# Patient Record
Sex: FEMALE | Race: WHITE | NOT HISPANIC OR LATINO | Employment: OTHER | URBAN - METROPOLITAN AREA
[De-identification: names, ages, dates, MRNs, and addresses within clinical notes are randomized per-mention and may not be internally consistent; named-entity substitution may affect disease eponyms.]

---

## 2017-04-26 NOTE — PROGRESS NOTES
"   SUBJECTIVE:     CC: Deanna Leahy is an 22 year old woman who presents for preventive health visit.     Healthy Habits:    Do you get at least three servings of calcium containing foods daily (dairy, green leafy vegetables, etc.)? {YES/NO, DAIRY INTAKE:222134::\"yes\"}    Amount of exercise or daily activities, outside of work: {AMOUNT EXERCISE:374548}    Problems taking medications regularly {Yes /No default:205484::\"No\"}    Medication side effects: {Yes /No default.:002076::\"No\"}    Have you had an eye exam in the past two years? {YESNOBLANK:984739}    Do you see a dentist twice per year? {YESNOBLANK:259942}    Do you have sleep apnea, excessive snoring or daytime drowsiness?{YESNOBLANK:603461}    {Outside tests to abstract? :651176}    {additional problems to add:143458}    Today's PHQ-2 Score:   PHQ-2 ( 1999 Pfizer) 11/16/2016 4/22/2016   Q1: Little interest or pleasure in doing things 0 0   Q2: Feeling down, depressed or hopeless 0 0   PHQ-2 Score 0 0   Little interest or pleasure in doing things - -   Feeling down, depressed or hopeless - -   PHQ-2 Score - -     {PHQ-2 LOOK IN ASSESSMENTS :874166}  Abuse: Current or Past(Physical, Sexual or Emotional)- {YES/NO/NA:927473}  Do you feel safe in your environment - {YES/NO/NA:837213}    Social History   Substance Use Topics     Smoking status: Never Smoker     Smokeless tobacco: Never Used     Alcohol use No     {ETOH AUDIT:660531}    No results for input(s): CHOL, HDL, LDL, TRIG, CHOLHDLRATIO, NHDL in the last 77868 hours.    Reviewed orders with patient.  Reviewed health maintenance and updated orders accordingly - {Yes/No:536752::\"Yes\"}    Mammo Decision Support:  {Mammo:520634}    Pertinent mammograms are reviewed under the imaging tab.  History of abnormal Pap smear: {PAP HX:361179}    Reviewed and updated as needed this visit by clinical staff         Reviewed and updated as needed this visit by Provider        {HISTORY OPTIONS:348462}    ROS:  {FEMALE " "PREVENTATIVE ROS:735055}    {CHRONICPROBDATA:954276}  OBJECTIVE:     There were no vitals taken for this visit.  EXAM:  {Exam Choices:315272}    ASSESSMENT/PLAN:     {Diag Picklist:570646}    COUNSELING:   {FEMALE COUNSELING MESSAGES:801058::\"Reviewed preventive health counseling, as reflected in patient instructions\"}    {Blood Pressure Screenin}     reports that she has never smoked. She has never used smokeless tobacco.  {Tobacco Cessation needed for ACO -- Delete if patient is a non-smoker:915832}  Estimated body mass index is 23.92 kg/(m^2) as calculated from the following:    Height as of 16: 5' 2.4\" (1.585 m).    Weight as of 16: 132 lb 8 oz (60.1 kg).   {Weight Management Plan needed for ACO:202273}    Counseling Resources:  ATP IV Guidelines  Pooled Cohorts Equation Calculator  Breast Cancer Risk Calculator  FRAX Risk Assessment  ICSI Preventive Guidelines  Dietary Guidelines for Americans,   USDA's MyPlate  ASA Prophylaxis  Lung CA Screening    Sho De La Torre PA-C  [facility]  "

## 2017-05-03 ENCOUNTER — OFFICE VISIT (OUTPATIENT)
Dept: FAMILY MEDICINE | Facility: OTHER | Age: 22
End: 2017-05-03
Payer: COMMERCIAL

## 2017-05-03 VITALS
TEMPERATURE: 98.7 F | RESPIRATION RATE: 12 BRPM | SYSTOLIC BLOOD PRESSURE: 110 MMHG | BODY MASS INDEX: 24.84 KG/M2 | DIASTOLIC BLOOD PRESSURE: 64 MMHG | HEART RATE: 72 BPM | WEIGHT: 135 LBS | HEIGHT: 62 IN

## 2017-05-03 DIAGNOSIS — N92.6 MENSTRUAL PERIOD LATE: ICD-10-CM

## 2017-05-03 DIAGNOSIS — D50.9 IRON DEFICIENCY ANEMIA, UNSPECIFIED IRON DEFICIENCY ANEMIA TYPE: ICD-10-CM

## 2017-05-03 DIAGNOSIS — Z23 NEED FOR VACCINATION: ICD-10-CM

## 2017-05-03 DIAGNOSIS — Z00.00 ENCOUNTER FOR ROUTINE ADULT HEALTH EXAMINATION WITHOUT ABNORMAL FINDINGS: Primary | ICD-10-CM

## 2017-05-03 LAB
B-HCG SERPL-ACNC: <1 IU/L (ref 0–5)
BETA HCG QUAL IFA URINE: NEGATIVE
FERRITIN SERPL-MCNC: 18 NG/ML (ref 12–150)
HGB BLD-MCNC: 13.7 G/DL (ref 11.7–15.7)
IRON SATN MFR SERPL: 12 % (ref 15–46)
IRON SERPL-MCNC: 45 UG/DL (ref 35–180)
TIBC SERPL-MCNC: 361 UG/DL (ref 240–430)
TSH SERPL DL<=0.005 MIU/L-ACNC: 1.76 MU/L (ref 0.4–4)

## 2017-05-03 PROCEDURE — 90715 TDAP VACCINE 7 YRS/> IM: CPT | Performed by: PHYSICIAN ASSISTANT

## 2017-05-03 PROCEDURE — 99395 PREV VISIT EST AGE 18-39: CPT | Mod: 25 | Performed by: PHYSICIAN ASSISTANT

## 2017-05-03 PROCEDURE — 84702 CHORIONIC GONADOTROPIN TEST: CPT | Performed by: PHYSICIAN ASSISTANT

## 2017-05-03 PROCEDURE — 85018 HEMOGLOBIN: CPT | Performed by: PHYSICIAN ASSISTANT

## 2017-05-03 PROCEDURE — 82728 ASSAY OF FERRITIN: CPT | Performed by: PHYSICIAN ASSISTANT

## 2017-05-03 PROCEDURE — 84703 CHORIONIC GONADOTROPIN ASSAY: CPT | Performed by: PHYSICIAN ASSISTANT

## 2017-05-03 PROCEDURE — 36415 COLL VENOUS BLD VENIPUNCTURE: CPT | Performed by: PHYSICIAN ASSISTANT

## 2017-05-03 PROCEDURE — 83540 ASSAY OF IRON: CPT | Performed by: PHYSICIAN ASSISTANT

## 2017-05-03 PROCEDURE — 83550 IRON BINDING TEST: CPT | Performed by: PHYSICIAN ASSISTANT

## 2017-05-03 PROCEDURE — 84443 ASSAY THYROID STIM HORMONE: CPT | Performed by: PHYSICIAN ASSISTANT

## 2017-05-03 PROCEDURE — 90471 IMMUNIZATION ADMIN: CPT | Performed by: PHYSICIAN ASSISTANT

## 2017-05-03 ASSESSMENT — PAIN SCALES - GENERAL: PAINLEVEL: NO PAIN (0)

## 2017-05-03 NOTE — NURSING NOTE
Prior to injection verified patient identity using patient's name and date of birth.  Screening Questionnaire for Adult Immunization    Are you sick today?   No   Do you have allergies to medications, food, a vaccine component or latex?   No   Have you ever had a serious reaction after receiving a vaccination?   No   Do you have a long-term health problem with heart disease, lung disease, asthma, kidney disease, metabolic disease (e.g. diabetes), anemia, or other blood disorder?   No   Do you have cancer, leukemia, HIV/AIDS, or any other immune system problem?   No   In the past 3 months, have you taken medications that affect  your immune system, such as prednisone, other steroids, or anticancer drugs; drugs for the treatment of rheumatoid arthritis, Crohn s disease, or psoriasis; or have you had radiation treatments?   No   Have you had a seizure, or a brain or other nervous system problem?   No   During the past year, have you received a transfusion of blood or blood     products, or been given immune (gamma) globulin or antiviral drug?   No   For women: Are you pregnant or is there a chance you could become        pregnant during the next month?   No   Have you received any vaccinations in the past 4 weeks?   No     Immunization questionnaire answers were all negative.      MNVFC doesn't apply on this patient    Per orders of Sho De La Torre, injection of tdap given by Sakina Flower. Patient instructed to remain in clinic for 20 minutes afterwards, and to report any adverse reaction to me immediately.       Screening performed by Sakina Flower on 5/3/2017 at 2:27 PM.

## 2017-05-03 NOTE — MR AVS SNAPSHOT
After Visit Summary   5/3/2017    Deanna Leahy    MRN: [id number]           Patient Information     Date Of Birth          1995        Visit Information        Provider Department      5/3/2017 9:30 AM Sho De La Torre PA-C [facility]        Today's Diagnoses     Encounter for routine adult health examination without abnormal findings    -  1    Menstrual period late        Iron deficiency anemia, unspecified iron deficiency anemia type        Need for vaccination          Care Instructions      Preventive Health Recommendations  Female Ages 18 to 25     Yearly exam:     See your health care provider every year in order to  o Review health changes.   o Discuss preventive care.    o Review your medicines if your doctor has prescribed any.      You should be tested each year for STDs (sexually transmitted diseases).       After age 20, talk to your provider about how often you should have cholesterol testing.      Starting at age 21, get a Pap test every three years. If you have an abnormal result, your doctor may have you test more often.      If you are at risk for diabetes, you should have a diabetes test (fasting glucose).     Shots:     Get a flu shot each year.     Get a tetanus shot every 10 years.     Consider getting the shot (vaccine) that prevents cervical cancer (Gardasil).    Nutrition:     Eat at least 5 servings of fruits and vegetables each day.    Eat whole-grain bread, whole-wheat pasta and brown rice instead of white grains and rice.    Talk to your provider about Calcium and Vitamin D.     Lifestyle    Exercise at least 150 minutes a week each week (30 minutes a day, 5 days a week). This will help you control your weight and prevent disease.    Limit alcohol to one drink per day.    No smoking.     Wear sunscreen to prevent skin cancer.    See your dentist every six months for an exam and cleaning.        Follow-ups after your visit        Who to contact   "   If you have questions or need follow up information about today's clinic visit or your schedule please contact [facility] directly at [phone].  Normal or non-critical lab and imaging results will be communicated to you by MyChart, letter or phone within 4 business days after the clinic has received the results. If you do not hear from us within 7 days, please contact the clinic through ditlohart or phone. If you have a critical or abnormal lab result, we will notify you by phone as soon as possible.  Submit refill requests through [email or web address] or call your pharmacy and they will forward the refill request to us. Please allow 3 business days for your refill to be completed.          Additional Information About Your Visit        [email or web address] Information     [email or web address] gives you secure access to your electronic health record. If you see a primary care provider, you can also send messages to your care team and make appointments. If you have questions, please call your primary care clinic.  If you do not have a primary care provider, please call [phone] and they will assist you.        Care EveryWhere ID     This is your Care EveryWhere ID. This could be used by other organizations to access your [place] medical records  [id number]        Your Vitals Were     Pulse Temperature Respirations Height Last Period BMI (Body Mass Index)    72 98.7  F (37.1  C) (Oral) 12 5' 2.25\" (1.581 m) 03/28/2017 24.49 kg/m2       Blood Pressure from Last 3 Encounters:   05/03/17 110/64   11/16/16 120/68   04/22/16 108/60    Weight from Last 3 Encounters:   05/03/17 135 lb (61.2 kg)   11/16/16 132 lb 8 oz (60.1 kg)   04/22/16 126 lb 14.4 oz (57.6 kg)              We Performed the Following     Beta HCG qual IFA urine     Ferritin     HCG, quantitative, pregnancy     Hemoglobin     Iron and iron binding capacity     TDAP VACCINE (ADACEL) [77581.002]     TSH with free T4 reflex        Primary Care Provider Office Phone # Fax " #    Sho De La Torre PA-C [phone]       [facility] [address] [place] [address]        Thank you!     Thank you for choosing [facility]  for your care. Our goal is always to provide you with excellent care. Hearing back from our patients is one way we can continue to improve our services. Please take a few minutes to complete the written survey that you may receive in the mail after your visit with us. Thank you!             Your Updated Medication List - Protect others around you: Learn how to safely use, store and throw away your medicines at www.disposemymeds.org.          This list is accurate as of: 5/3/17  1:13 PM.  Always use your most recent med list.                   Brand Name Dispense Instructions for use    ibuprofen 200 MG tablet    ADVIL/MOTRIN    120 tablet    Take 3 tablets (600 mg) by mouth every 6 hours as needed for pain or fever

## 2017-05-03 NOTE — NURSING NOTE
"Chief Complaint   Patient presents with     Physical     Panel Management     Tdap, HPV, Chlamydia       Initial /64  Pulse 72  Temp 98.7  F (37.1  C) (Oral)  Resp 12  Ht 5' 2.25\" (1.581 m)  Wt 135 lb (61.2 kg)  LMP 03/28/2017  BMI 24.49 kg/m2 Estimated body mass index is 24.49 kg/(m^2) as calculated from the following:    Height as of this encounter: 5' 2.25\" (1.581 m).    Weight as of this encounter: 135 lb (61.2 kg).  Medication Reconciliation: complete       Sakina Flower CMA (AAMA)      "

## 2017-05-03 NOTE — PROGRESS NOTES
SUBJECTIVE:     CC: Deanna Leahy is an 22 year old woman who presents for preventive health visit.     Physical   Annual:     Getting at least 3 servings of Calcium per day::  Yes    Bi-annual eye exam::  NO    Dental care twice a year::  Yes    Sleep apnea or symptoms of sleep apnea::  Daytime drowsiness    Diet::  Regular (no restrictions)    Frequency of exercise::  4-5 days/week    Duration of exercise::  30-45 minutes    Taking medications regularly::  Yes    Medication side effects::  None    Additional concerns today::  YES (discuss late menstrual cycle and iron/iron levels)      Period is 2 weeks late   Her and her boyfriend have been trying to get pregnant. They have been together for 5 years  Has taken 2 neg home pregnancy tests 1 week ago  33-35 day cycle is normal for her      Feels like daytime drowsiness related to low iron  Glasgow light 21mg iron  5 days per week\      Today's PHQ-2 Score:   PHQ-2 ( 1999 Pfizer) 4/30/2017   Little interest or pleasure in doing things Not at all   Feeling down, depressed or hopeless Not at all   PHQ-2 Score 0       Abuse: Current or Past(Physical, Sexual or Emotional)- No  Do you feel safe in your environment - Yes    Social History   Substance Use Topics     Smoking status: Never Smoker     Smokeless tobacco: Never Used     Alcohol use No     The patient does not drink >3 drinks per day nor >7 drinks per week.    No results for input(s): CHOL, HDL, LDL, TRIG, CHOLHDLRATIO, NHDL in the last 11817 hours.    Reviewed orders with patient.  Reviewed health maintenance and updated orders accordingly - Yes    Mammo Decision Support:  Mammogram not appropriate for this patient based on age.    Pertinent mammograms are reviewed under the imaging tab.  History of abnormal Pap smear: NO - age 21-29 PAP every 3 years recommended    Reviewed and updated as needed this visit by clinical staff  Tobacco  Allergies  Med Hx  Surg Hx  Fam Hx  Soc Hx        Reviewed and  "updated as needed this visit by Provider            ROS:  C: NEGATIVE for fever, chills, change in weight  I: NEGATIVE for worrisome rashes, moles or lesions  E: NEGATIVE for vision changes or irritation  ENT: NEGATIVE for ear, mouth and throat problems  R: NEGATIVE for significant cough or SOB  BREAST: POSITIVE for left breat tenderness  CV: NEGATIVE for chest pain, palpitations or peripheral edema  GI: POSITIVE for nausea  : NEGATIVE for unusual urinary or vaginal symptoms. Periods are regular.  M: NEGATIVE for significant arthralgias or myalgia  N: NEGATIVE for weakness, dizziness or paresthesias  P: NEGATIVE for changes in mood or affect    Labs reviewed in EPIC  BP Readings from Last 3 Encounters:   05/03/17 110/64   11/16/16 120/68   04/22/16 108/60    Wt Readings from Last 3 Encounters:   05/03/17 135 lb (61.2 kg)   11/16/16 132 lb 8 oz (60.1 kg)   04/22/16 126 lb 14.4 oz (57.6 kg)                  OBJECTIVE:     /64  Pulse 72  Temp 98.7  F (37.1  C) (Oral)  Resp 12  Ht 5' 2.25\" (1.581 m)  Wt 135 lb (61.2 kg)  LMP 03/28/2017  BMI 24.49 kg/m2  EXAM:  GENERAL: healthy, alert and no distress  EYES: Eyes grossly normal to inspection, PERRL and conjunctivae and sclerae normal  HENT: ear canals and TM's normal, nose and mouth without ulcers or lesions  NECK: no adenopathy, no asymmetry, masses, or scars and thyroid normal to palpation  RESP: lungs clear to auscultation - no rales, rhonchi or wheezes  BREAST: normal without masses, tenderness or nipple discharge and no palpable axillary masses or adenopathy  BREAST: tenderness left medial breast, no masses   CV: regular rate and rhythm, normal S1 S2, no S3 or S4, no murmur, click or rub, no peripheral edema and peripheral pulses strong  ABDOMEN: soft, nontender, no hepatosplenomegaly, no masses and bowel sounds normal  MS: no gross musculoskeletal defects noted, no edema  SKIN: no suspicious lesions or rashes  NEURO: Normal strength and tone, " "mentation intact and speech normal  PSYCH: mentation appears normal, affect normal/bright    ASSESSMENT/PLAN:     1. Encounter for routine adult health examination without abnormal findings  Breast tenderness- she will observe if it is not resolved with next menses will do ultrasound     2. Menstrual period late  Await results, if her periods remain irregular she will let me know, eat healthy , exercise and get enough sleep   - Beta HCG qual IFA urine  - TSH with free T4 reflex  - HCG, quantitative, pregnancy    3. Iron deficiency anemia, unspecified iron deficiency anemia type  Continue iron supplement   - Ferritin  - Hemoglobin  - Iron and iron binding capacity    4. Need for vaccination  Updated   - TDAP VACCINE (ADACEL) [28084.002]    COUNSELING:  Reviewed preventive health counseling, as reflected in patient instructions         reports that she has never smoked. She has never used smokeless tobacco.    Estimated body mass index is 24.49 kg/(m^2) as calculated from the following:    Height as of this encounter: 5' 2.25\" (1.581 m).    Weight as of this encounter: 135 lb (61.2 kg).       Counseling Resources:  ATP IV Guidelines  Pooled Cohorts Equation Calculator  Breast Cancer Risk Calculator  FRAX Risk Assessment  ICSI Preventive Guidelines  Dietary Guidelines for Americans, 2010  [employer]'s MyPlate  ASA Prophylaxis  Lung CA Screening    Sho De La Torre PA-C  [facility]  Answers for HPI/ROS submitted by the patient on 4/30/2017   Q1: Little interest or pleasure in doing things: 0=Not at all  Q2: Feeling down, depressed or hopeless: 0=Not at all  PHQ-2 Score: 0    "

## 2017-07-14 ENCOUNTER — TELEPHONE (OUTPATIENT)
Dept: FAMILY MEDICINE | Facility: OTHER | Age: 22
End: 2017-07-14

## 2017-07-14 ENCOUNTER — ALLIED HEALTH/NURSE VISIT (OUTPATIENT)
Dept: FAMILY MEDICINE | Facility: OTHER | Age: 22
End: 2017-07-14
Payer: COMMERCIAL

## 2017-07-14 DIAGNOSIS — N91.2 AMENORRHEA: Primary | ICD-10-CM

## 2017-07-14 LAB — B-HCG SERPL-ACNC: 339 IU/L (ref 0–5)

## 2017-07-14 PROCEDURE — 36415 COLL VENOUS BLD VENIPUNCTURE: CPT | Performed by: FAMILY MEDICINE

## 2017-07-14 PROCEDURE — 84702 CHORIONIC GONADOTROPIN TEST: CPT | Performed by: FAMILY MEDICINE

## 2017-07-14 PROCEDURE — 99207 ZZC NO CHARGE NURSE ONLY: CPT

## 2017-07-14 NOTE — MR AVS SNAPSHOT
"              After Visit Summary   7/14/2017    Deanna Leahy    MRN: [id number]           Patient Information     Date Of Birth          1995        Visit Information        Provider Department      7/14/2017 11:00 AM NL RN [facility]        Today's Diagnoses     Amenorrhea    -  1      Care Instructions    According to your last menstrual period, you are approximately 6 weeks and 0 days pregnant.  Your estimated due date is 03/09/2018.    To do list:  1. If you have not already started taking a prenatal vitamin, please start today.  2. Visit with our OB Intake nurse, Carlyn: this can be scheduled any time between today and the first visit with your provider. We do require that you see OB intake before your \"first OB\" visit if you choose to deliver at [facility].          Mondays- [place] (11am-6pm)       Tuesdays- [place] (9am-11am)/ [place] (1pm-3pm)       Wednesdays- [place] (9am-2pm)       Thursdays- [place] (8am-10am)    3. The first OB visit with provider of choice is to be scheduled between 10 and 12 weeks: .  To make these appointments, or if you have any questions and would like to speak to your care team, you can call the clinic's main phone number:       [facility]: [phone]       [facility]: [phone]       [facility]: [phone]       [facility]: [phone]       [facility]: [phone]    Please remember, we would like to hear from you if you have any vaginal bleeding or any moderate to severe abdominal pain/cramping. You can call any of the phone numbers above and speak with an RN promptly, even if it is after clinic hours, someone will answer your call. You can also call the numbers listed in your take home folder from today's visit.     Thank you for choosing [facility], and Congratulations!    Jasper Smith, RN, BSN                    Follow-ups after your visit        Who to contact     If you have " questions or need follow up information about today's clinic visit or your schedule please contact [facility] directly at [phone].  Normal or non-critical lab and imaging results will be communicated to you by MyChart, letter or phone within 4 business days after the clinic has received the results. If you do not hear from us within 7 days, please contact the clinic through [employer] or phone. If you have a critical or abnormal lab result, we will notify you by phone as soon as possible.  Submit refill requests through [employer] or call your pharmacy and they will forward the refill request to us. Please allow 3 business days for your refill to be completed.          Additional Information About Your Visit        [employer] Information     [employer] gives you secure access to your electronic health record. If you see a primary care provider, you can also send messages to your care team and make appointments. If you have questions, please call your primary care clinic.  If you do not have a primary care provider, please call [phone] and they will assist you.        Care EveryWhere ID     This is your Care EveryWhere ID. This could be used by other organizations to access your [place] medical records  [id number]        Your Vitals Were     Last Period Breastfeeding?                06/02/2017 (Exact Date) No           Blood Pressure from Last 3 Encounters:   05/03/17 110/64   11/16/16 120/68   04/22/16 108/60    Weight from Last 3 Encounters:   05/03/17 135 lb (61.2 kg)   11/16/16 132 lb 8 oz (60.1 kg)   04/22/16 126 lb 14.4 oz (57.6 kg)              We Performed the Following     Beta HCG qual IFA urine     HCG quantitative pregnancy        Primary Care Provider Office Phone # Fax #    Sho De La Torre PA-C [phone]       [facility] [address] [place] [address]        Equal Access to Services     CARLOS CONN: hollie Rios,  natalyachiquisgeorgina isacaseyjovi gasparjasmyn eng elliottin hayaan adeeg kharash la'aan ah. [facility] [phone].    ATENCIÓN: Si belkis thomas, tiene a simons disposición servicios gratuitos de asistencia lingüística. Antonio al [phone].    We comply with applicable federal civil rights laws and [place] laws. We do not discriminate on the basis of race, color, national origin, age, disability sex, sexual orientation or gender identity.            Thank you!     Thank you for choosing [facility]  for your care. Our goal is always to provide you with excellent care. Hearing back from our patients is one way we can continue to improve our services. Please take a few minutes to complete the written survey that you may receive in the mail after your visit with us. Thank you!             Your Updated Medication List - Protect others around you: Learn how to safely use, store and throw away your medicines at www.disposemymeds.org.          This list is accurate as of: 7/14/17 11:18 AM.  Always use your most recent med list.                   Brand Name Dispense Instructions for use Diagnosis    ibuprofen 200 MG tablet    ADVIL/MOTRIN    120 tablet    Take 3 tablets (600 mg) by mouth every 6 hours as needed for pain or fever

## 2017-07-14 NOTE — PROGRESS NOTES
Patient is here for pregnancy confirmation.  She was seen at [facility] yesterday for some bleeding and cramping.  They advised her to follow up in clinic for repeat HCG.  LMP 06/02/2017    Will follow up with OBGYN if number rising.  Has plan from [facility] if decreasing.    Jasper Smith, RN, BSN

## 2017-07-14 NOTE — PATIENT INSTRUCTIONS
"According to your last menstrual period, you are approximately 6 weeks and 0 days pregnant.  Your estimated due date is 03/09/2018.    To do list:  1. If you have not already started taking a prenatal vitamin, please start today.  2. Visit with our OB Intake nurse, Carlyn: this can be scheduled any time between today and the first visit with your provider. We do require that you see OB intake before your \"first OB\" visit if you choose to deliver at [facility].          Mondays- [place] (11am-6pm)       Tuesdays- [place] (9am-11am)/ [place] (1pm-3pm)       Wednesdays- [place] (9am-2pm)       Thursdays- [place] (8am-10am)    3. The first OB visit with provider of choice is to be scheduled between 10 and 12 weeks: .  To make these appointments, or if you have any questions and would like to speak to your care team, you can call the clinic's main phone number:       [facility]: [phone]       [facility]: [phone]       [facility]: [phone]       [facility]: [phone]       [facility]: [phone]    Please remember, we would like to hear from you if you have any vaginal bleeding or any moderate to severe abdominal pain/cramping. You can call any of the phone numbers above and speak with an RN promptly, even if it is after clinic hours, someone will answer your call. You can also call the numbers listed in your take home folder from today's visit.     Thank you for choosing [facility], and Congratulations!    Jasper Smith, RN, BSN            "

## 2017-07-17 ENCOUNTER — MYC MEDICAL ADVICE (OUTPATIENT)
Dept: FAMILY MEDICINE | Facility: OTHER | Age: 22
End: 2017-07-17

## 2017-07-17 ENCOUNTER — TELEPHONE (OUTPATIENT)
Dept: FAMILY MEDICINE | Facility: OTHER | Age: 22
End: 2017-07-17

## 2017-07-17 NOTE — TELEPHONE ENCOUNTER
Result read via [employer].  Left message to return call if she had any questions or concerns.    Jasper Smith RN, BSN      BETA HCG - QUANTITATIVE (07/13/2017 10:45 AM)  BETA HCG - QUANTITATIVE (07/13/2017 10:45 AM)   Component Value Ref Range   BETA-.4      Result from Friday is 339    Probable miscarriage,  Patient was given plan from [facility].    To follow up with primary care if needed

## 2017-08-29 ENCOUNTER — TELEPHONE (OUTPATIENT)
Dept: FAMILY MEDICINE | Facility: OTHER | Age: 22
End: 2017-08-29

## 2017-08-29 NOTE — PROGRESS NOTES
SUBJECTIVE:                                                    Deanna Leahy is a 22 year old female who presents to clinic today for the following health issues:      HPI    Dizziness--   Onset: Ongoing for the last month , she had a miscarriage the second week in July. Had heavy bleeding with this for 10 days.  The dizziness started a few weeks afterwards.  She has had 1 normal period since then.  She has heavy periods typically lasting 6-7 days with the first few days being heavy.  She has a history of iron deficiency anemia.  She has struggled to take her supplements lately due to it getting stuck in her throat.       Description:   Do you feel faint:  YES, on and off   Does it feel like the surroundings (bed, room) are moving: sometimes   Unsteady/off balance: sometimes   Have you passed out or fallen: YES, for a second yesterday - she was at work at [employer], did not feel well, got a ride home.  Once at home she was sitting down, and felt dizzy she fainted just for a second per pt. She has been drinking a lot of caffeine due to her fatigue.    She has been drinking protein shakes.  Trying to eat healthy foods.  She has been so tried lately.  Her eyelids have been twitching per pt.  She is taking prenatal gummy vit without iron and trying to take her iron supplement on the side.     Intensity: mild    Progression of Symptoms:  same    Accompanying Signs & Symptoms:  Heart palpitations: no   Nausea, vomiting: YES, in the morning feeling nauseous , gets heartburn on occasion as well   Weakness in arms or legs: YES, arms feeling very heavy   Fatigue: YES  Vision or speech changes: no   Ringing in ears (Tinnitus): no   Hearing Loss: no     History:   Head trauma/concussion hx: no   Previous similar symptoms: no   Recent bleeding history: no     Precipitating factors:   Worse with activity or head movement: YES, at work   Any new medications (BP?): no   Alcohol/drug abuse/withdrawal: no  "    Alleviating factors:   Does staying in a fixed position give relief:  YES    Therapies Tried and outcome:  Was told to take Iron supplement but did continue  because it kept getting stuck in the throat.       Problem list and histories reviewed & adjusted, as indicated.  Additional history: as documented        BP Readings from Last 3 Encounters:   08/30/17 102/80   05/03/17 110/64   11/16/16 120/68    Wt Readings from Last 3 Encounters:   08/30/17 131 lb 14.4 oz (59.8 kg)   05/03/17 135 lb (61.2 kg)   11/16/16 132 lb 8 oz (60.1 kg)              Labs reviewed in EPIC      ROS:  C: NEGATIVE for fever, chills, change in weight  E/M: NEGATIVE for ear, mouth and throat problems  R: NEGATIVE for significant cough or SOB  CV: NEGATIVE for chest pain, palpitations or peripheral edema  GI: NEGATIVE for nausea, abdominal pain, heartburn, or change in bowel habits  NEURO: positive for syncope yesterday   PSYCHIATRIC: NEGATIVE for changes in mood or affect, doing ok with miscarriage per pt     OBJECTIVE:     /80  Pulse 80  Temp 97.9  F (36.6  C) (Oral)  Resp 16  Ht 4' 11.96\" (1.523 m)  Wt 131 lb 14.4 oz (59.8 kg)  LMP 08/16/2017 (Approximate)  Breastfeeding? Unknown  BMI 25.79 kg/m2  Body mass index is 25.79 kg/(m^2).  GENERAL: healthy, alert and no distress  NECK: no adenopathy, no asymmetry, masses, or scars and thyroid normal to palpation  RESP: lungs clear to auscultation - no rales, rhonchi or wheezes  CV: regular rate and rhythm, normal S1 S2, no S3 or S4, no murmur, click or rub, no peripheral edema and peripheral pulses strong  ABDOMEN: soft, nontender, no hepatosplenomegaly, no masses and bowel sounds normal  MS: no gross musculoskeletal defects noted, no edema  SKIN: no suspicious lesions or rashes  PSYCH: mentation appears normal, affect normal/bright    Diagnostic Test Results:  Results for orders placed or performed in visit on 08/30/17 (from the past 24 hour(s))   CBC with platelets   Result " Value Ref Range    WBC 6.2 4.0 - 11.0 10e9/L    RBC Count 5.01 3.8 - 5.2 10e12/L    Hemoglobin 14.3 11.7 - 15.7 g/dL    Hematocrit 43.2 35.0 - 47.0 %    MCV 86 78 - 100 fl    MCH 28.5 26.5 - 33.0 pg    MCHC 33.1 31.5 - 36.5 g/dL    RDW 13.0 10.0 - 15.0 %    Platelet Count 304 150 - 450 10e9/L       ASSESSMENT/PLAN:       1. Fatigue, unspecified type  Will evaluate with labs, advised less energy drinks more iron containing foods and water   - HCG quantitative pregnancy  - CBC with platelets  - Ferritin  - Iron and iron binding capacity  - Glucose  - TSH with free T4 reflex    2. Iron deficiency anemia, unspecified iron deficiency anemia type  Try to find chewable prenatal with iron or add liquid iron to gummy prenatal  - CBC with platelets  - Ferritin  - Iron and iron binding capacity    3. Syncope, unspecified syncope type  Perhaps related to iron deficiency and blood loss with miscarriage, does not sound cardiac related   - HCG quantitative pregnancy  - CBC with platelets  - Ferritin  - Iron and iron binding capacity  - Glucose  - TSH with free T4 reflex    Recheck as needed     Sho De La Torre PA-C  [facility]  Electronically signed by Sho De La Torre PA-C

## 2017-08-29 NOTE — TELEPHONE ENCOUNTER
Deanna Leahy is a 22 year old female who calls with dizziness.    NURSING ASSESSMENT:  Description:  Patient describes a constant, yet intermittent mild dizziness. She also feels like her iron pills get stuck in her throat when she takes them.   Onset/duration: several days  Precip. factors:  History of low iron  Associated symptoms:  fatigue  Allergies:   Allergies   Allergen Reactions     No Known Drug Allergies        RECOMMENDED DISPOSITION:  Will keep appointment for tomorrow morning with NP.  Will comply with recommendation: YES   If further questions/concerns or if Sx do not improve, worsen or new Sx develop, call your PCP or [facility] as soon as possible.    NOTES:  Disposition was determined by the first positive assessment question, therefore all previous assessment questions were negative.     Guideline used:  Telephone Triage Protocols for Nurses, Fifth Edition, Giovana Dong, RN, BSN

## 2017-08-29 NOTE — TELEPHONE ENCOUNTER
Left message for patient to return call to RN.   Did hold time for tomorrow 9 am with NP to confirm.    Jasper Smith, RN, BSN

## 2017-08-29 NOTE — TELEPHONE ENCOUNTER
See below. Can you work her in tomorrow? Would you like her triaged. Please call.       Thank you,     Jud Lara- Pt Rep.           ----- Message -----        From: Deanna Leahy        Sent: 8/29/2017   1:53 PM          To: [facility] Schedulers     Subject: Appointment Request                                    Appointment Request From: Deanna Leahy          With Provider: Sho De La Torre PA-C [-Primary Care Physician-]          Preferred Date Range: From 8/30/2017 To 8/30/2017          Preferred Times: Any          Reason for visit: Request an Appointment          Comments:     I know this is short notice but I'm wondering if you have any appointments open for tomorrow? I have been light-headed and I take iron supplement and the pills keep getting stuck in my throat.

## 2017-08-30 ENCOUNTER — OFFICE VISIT (OUTPATIENT)
Dept: FAMILY MEDICINE | Facility: OTHER | Age: 22
End: 2017-08-30
Payer: COMMERCIAL

## 2017-08-30 VITALS
BODY MASS INDEX: 25.9 KG/M2 | TEMPERATURE: 97.9 F | WEIGHT: 131.9 LBS | RESPIRATION RATE: 16 BRPM | DIASTOLIC BLOOD PRESSURE: 80 MMHG | HEIGHT: 60 IN | SYSTOLIC BLOOD PRESSURE: 102 MMHG | HEART RATE: 80 BPM

## 2017-08-30 DIAGNOSIS — R55 SYNCOPE, UNSPECIFIED SYNCOPE TYPE: ICD-10-CM

## 2017-08-30 DIAGNOSIS — D50.9 IRON DEFICIENCY ANEMIA, UNSPECIFIED IRON DEFICIENCY ANEMIA TYPE: ICD-10-CM

## 2017-08-30 DIAGNOSIS — R53.83 FATIGUE, UNSPECIFIED TYPE: Primary | ICD-10-CM

## 2017-08-30 LAB
B-HCG SERPL-ACNC: <1 IU/L (ref 0–5)
ERYTHROCYTE [DISTWIDTH] IN BLOOD BY AUTOMATED COUNT: 13 % (ref 10–15)
FERRITIN SERPL-MCNC: 19 NG/ML (ref 12–150)
GLUCOSE SERPL-MCNC: 77 MG/DL (ref 70–99)
HCT VFR BLD AUTO: 43.2 % (ref 35–47)
HGB BLD-MCNC: 14.3 G/DL (ref 11.7–15.7)
IRON SATN MFR SERPL: 21 % (ref 15–46)
IRON SERPL-MCNC: 75 UG/DL (ref 35–180)
MCH RBC QN AUTO: 28.5 PG (ref 26.5–33)
MCHC RBC AUTO-ENTMCNC: 33.1 G/DL (ref 31.5–36.5)
MCV RBC AUTO: 86 FL (ref 78–100)
PLATELET # BLD AUTO: 304 10E9/L (ref 150–450)
RBC # BLD AUTO: 5.01 10E12/L (ref 3.8–5.2)
TIBC SERPL-MCNC: 361 UG/DL (ref 240–430)
TSH SERPL DL<=0.005 MIU/L-ACNC: 2.48 MU/L (ref 0.4–4)
WBC # BLD AUTO: 6.2 10E9/L (ref 4–11)

## 2017-08-30 PROCEDURE — 84443 ASSAY THYROID STIM HORMONE: CPT | Performed by: PHYSICIAN ASSISTANT

## 2017-08-30 PROCEDURE — 82947 ASSAY GLUCOSE BLOOD QUANT: CPT | Performed by: PHYSICIAN ASSISTANT

## 2017-08-30 PROCEDURE — 83550 IRON BINDING TEST: CPT | Performed by: PHYSICIAN ASSISTANT

## 2017-08-30 PROCEDURE — 83540 ASSAY OF IRON: CPT | Performed by: PHYSICIAN ASSISTANT

## 2017-08-30 PROCEDURE — 36415 COLL VENOUS BLD VENIPUNCTURE: CPT | Performed by: PHYSICIAN ASSISTANT

## 2017-08-30 PROCEDURE — 84702 CHORIONIC GONADOTROPIN TEST: CPT | Performed by: PHYSICIAN ASSISTANT

## 2017-08-30 PROCEDURE — 82728 ASSAY OF FERRITIN: CPT | Performed by: PHYSICIAN ASSISTANT

## 2017-08-30 PROCEDURE — 99214 OFFICE O/P EST MOD 30 MIN: CPT | Performed by: PHYSICIAN ASSISTANT

## 2017-08-30 PROCEDURE — 85027 COMPLETE CBC AUTOMATED: CPT | Performed by: PHYSICIAN ASSISTANT

## 2017-08-30 ASSESSMENT — PAIN SCALES - GENERAL: PAINLEVEL: NO PAIN (0)

## 2017-08-30 NOTE — MR AVS SNAPSHOT
"              After Visit Summary   8/30/2017    Deanna Leahy    MRN: [id number]           Patient Information     Date Of Birth          1995        Visit Information        Provider Department      8/30/2017 9:00 AM Sho De La Torre PA-C [facility]        Today's Diagnoses     Fatigue, unspecified type    -  1    Iron deficiency anemia, unspecified iron deficiency anemia type        Syncope, unspecified syncope type           Follow-ups after your visit        Who to contact     If you have questions or need follow up information about today's clinic visit or your schedule please contact [facility] directly at [phone].  Normal or non-critical lab and imaging results will be communicated to you by MyChart, letter or phone within 4 business days after the clinic has received the results. If you do not hear from us within 7 days, please contact the clinic through [email or web address] or phone. If you have a critical or abnormal lab result, we will notify you by phone as soon as possible.  Submit refill requests through [employer] or call your pharmacy and they will forward the refill request to us. Please allow 3 business days for your refill to be completed.          Additional Information About Your Visit        MyChart Information     [employer] gives you secure access to your electronic health record. If you see a primary care provider, you can also send messages to your care team and make appointments. If you have questions, please call your primary care clinic.  If you do not have a primary care provider, please call [phone] and they will assist you.        Care EveryWhere ID     This is your Care EveryWhere ID. This could be used by other organizations to access your [place] medical records  [id number]        Your Vitals Were     Pulse Temperature Respirations Height Last Period Breastfeeding?    80 97.9  F (36.6  C) (Oral) 16 4' 11.96\" (1.523 m) 08/16/2017 (Approximate) " Unknown    BMI (Body Mass Index)                   25.79 kg/m2            Blood Pressure from Last 3 Encounters:   08/30/17 102/80   05/03/17 110/64   11/16/16 120/68    Weight from Last 3 Encounters:   08/30/17 131 lb 14.4 oz (59.8 kg)   05/03/17 135 lb (61.2 kg)   11/16/16 132 lb 8 oz (60.1 kg)              We Performed the Following     CBC with platelets     Ferritin     Glucose     HCG quantitative pregnancy     Iron and iron binding capacity     TSH with free T4 reflex        Primary Care Provider Office Phone # Fax #    Sho De La Torre PA-C [phone] [address] [place] [address]        Equal Access to Services     CARLOS BELTRAN : Franci Wood, warell banuelos, qaevelio kaalmajovi melara, jasmyn manjarrez. So [facility] [phone].    ATENCIÓN: Si habla español, tiene a simons disposición servicios gratuitos de asistencia lingüística. Llame al [phone].    We comply with applicable federal civil rights laws and [place] laws. We do not discriminate on the basis of race, color, national origin, age, disability sex, sexual orientation or gender identity.            Thank you!     Thank you for choosing [facility]  for your care. Our goal is always to provide you with excellent care. Hearing back from our patients is one way we can continue to improve our services. Please take a few minutes to complete the written survey that you may receive in the mail after your visit with us. Thank you!             Your Updated Medication List - Protect others around you: Learn how to safely use, store and throw away your medicines at www.disposemymeds.org.          This list is accurate as of: 8/30/17 10:00 AM.  Always use your most recent med list.                   Brand Name Dispense Instructions for use Diagnosis    ibuprofen 200 MG tablet    ADVIL/MOTRIN    120 tablet    Take 3 tablets (600 mg) by mouth every 6 hours as needed for pain or  fever

## 2017-08-30 NOTE — NURSING NOTE
"Chief Complaint   Patient presents with     Dizziness     Panel Management     HPV Chlamydia       Initial /80  Pulse 80  Temp 97.9  F (36.6  C) (Oral)  Resp 16  Ht 4' 11.96\" (1.523 m)  Wt 131 lb 14.4 oz (59.8 kg)  LMP 08/16/2017 (Approximate)  Breastfeeding? Unknown  BMI 25.79 kg/m2 Estimated body mass index is 25.79 kg/(m^2) as calculated from the following:    Height as of this encounter: 4' 11.96\" (1.523 m).    Weight as of this encounter: 131 lb 14.4 oz (59.8 kg).  Medication Reconciliation: complete   Berta Osorio MA       "

## 2018-01-10 ENCOUNTER — TELEPHONE (OUTPATIENT)
Dept: FAMILY MEDICINE | Facility: OTHER | Age: 23
End: 2018-01-10

## 2018-01-10 NOTE — TELEPHONE ENCOUNTER
Spoke with pt and she would like to keep appt as scheduled and not see another provider.    Sakina Flower CMA ([facility])

## 2018-01-10 NOTE — TELEPHONE ENCOUNTER
"Appt scheduled via [employer] on 1/16 for \" continuous heartburn and some light-headedness\". Would you like this triaged?   Thank you,  Jud Lara- Pt Rep.     "

## 2018-01-10 NOTE — TELEPHONE ENCOUNTER
"Sho,  Please review below. She has an appointment for next week, but per triage protocol should be seen sooner. She states that she is comfortable waiting until next week as long as her symptoms don't get worse.  Next 5 appointments (look out 90 days)     Jan 16, 2018 10:45 AM NILDA Mishra with Sho De La Torre PA-C   [facility]    [address]  [facility] [address] [phone]                    RN triage phone assessment note: 1/10/2018  Deanna Leahy is a 22 year old female with symptoms of heartburn. I spoke with her today.    NURSING ASSESSMENT:  Description:  Deanna believes she's having heartburn, which started 2-3 weeks ago. It's fairly constant, \"I can feel it come up in my throat and it burns.\" This sensation is sometimes worse when laying down, and sometimes a little better after eating, but hasn't really seemed to go away since it started. She has taken TUMS without any relief. Today she picked up some 20mg Omeprazole and just took her first dose. She hasn't had any changes in breathing, and denies any pain. No vomiting or fainting. She has noticed some dizziness, which she describes as a \"brain fog,\" states that this doesn't happen every day, but has also been going on for a few weeks. \"It will sometimes last a few hours, it's not like I can't walk or anything, it just feels like I just get really fatigued and in a brain fog, but sometimes when I sit down and eat, then it will go away.\" She had an episode of this earlier today, where she felt cold and sweaty during it, but then ate a salad and felt better. She denies these symptoms now, but does have the continued burning sensation in her throat. She reports that she has been taking an iron supplement for about 6 months after being told that she had low iron, wondering if this could be related to that.  Onset/duration:  \"a few weeks\"  Precip. factors:  unknown  Associated symptoms: "  See above  Improves/worsens symptoms:  Symptoms sometimes improve with rest and eating. Heartburn occasionally worse when laying down.  Pain scale (0-10):   0/10  Medications related to the above issues: TUMS--no relief. Omeprazole 20mg daily--patient just started this today. The only other medication she takes is and Iron supplement, 26mg daily.  Allergies:   Allergies   Allergen Reactions     No Known Drug Allergies        NURSING PLAN: Routed to provider Yes    RECOMMENDED DISPOSITION:  Per triage protocol, Emergency care if having heartburn symptoms WITH dizziness or diaphoresis, but there are currently no associated symptoms, so will discuss with PCP.  Will comply with recommendation: Yes, she will await a call back with our plan.  If further questions/concerns or if symptoms do not improve, worsen or new symptoms develop, call your PCP or [facility] as soon as possible.      Guideline used:  Telephone Triage Protocols for Nurses, Fifth Edition, Giovana Burgos  Heartburn, p. 313  NOTE:  Disposition was determined by the first positive assessment question in the listed triage protocol, therefore all previous assessment questions were negative.       Gaetano Baldwin, RN, BSN

## 2018-01-11 NOTE — PROGRESS NOTES
SUBJECTIVE:                                                    Deanna Leahy is a 22 year old female who presents to clinic today for the following health issues:      HPI    GERD/Heartburn--  Onset: about 3 weeks     Description:     Burning in chest: yes to the level of the lower throat     Intensity: mild    Progression of Symptoms: same    Accompanying Signs & Symptoms:  Does it feel like food gets stuck: YES- sometimes only with use of supplements , no longer having this issue as no longer taking supplement  Nausea: no but gets an upset stomach  Vomiting (bloody?): no  Abdominal Pain: YES- but only with lots of gas, pain is in lower abdomen   Black-Tarry stools: no:  Bloody stools: no    History:   Previous ulcers: no    Precipitating factors:   Caffeine use: YES- usually green tea and drinks caffeine every other day, about the same, coffee definitely makes it worse   Alcohol use: YES- but very rare, had a baileys in coffee after a few sips stopped drinking it as it worsened   NSAID/Aspirin use: no  Tobacco use: no  Worse with usually notices in the morning, yesterday had pasta with lots of butter and was worse after that.    Alleviating factors:  Drank konbucha and yogurt that seems to help    Therapies Tried and outcome:konbucha, yogurt, lots of water, omeprazole 20 mg once daily for 5-6 days has helped a bit        Problem list and histories reviewed & adjusted, as indicated.  Additional history: She has a history of iron deficiency anemia.  She was taking her iron supplements for 3 months as instructed.  She stopped her iron supplement a few weeks ago thinking it may be contributing to her symptoms        BP Readings from Last 3 Encounters:   01/16/18 110/66   08/30/17 102/80   05/03/17 110/64    Wt Readings from Last 3 Encounters:   01/16/18 135 lb (61.2 kg)   08/30/17 131 lb 14.4 oz (59.8 kg)   05/03/17 135 lb (61.2 kg)                  Labs reviewed in EPIC      ROS:  C: NEGATIVE for fever,  "chills, change in weight  E/M: NEGATIVE for ear, mouth and throat problems  R: NEGATIVE for significant cough or SOB  CV: NEGATIVE for chest pain, palpitations or peripheral edema  GI: Reflux symptoms as above  PSYCHIATRIC: NEGATIVE for changes in mood or affect    OBJECTIVE:     /66  Pulse 70  Temp 98.5  F (36.9  C) (Temporal)  Resp 12  Ht 5' 3.5\" (1.613 m)  Wt 135 lb (61.2 kg)  BMI 23.54 kg/m2  Body mass index is 23.54 kg/(m^2).  GENERAL: healthy, alert and no distress  NECK: no adenopathy, no asymmetry, masses, or scars and thyroid normal to palpation  RESP: lungs clear to auscultation - no rales, rhonchi or wheezes  CV: regular rate and rhythm, normal S1 S2, no S3 or S4, no murmur, click or rub, no peripheral edema and peripheral pulses strong  ABDOMEN: soft, nontender, no hepatosplenomegaly, no masses and bowel sounds normal  MS: no gross musculoskeletal defects noted, no edema  PSYCH: mentation appears normal, affect normal/bright    Diagnostic Test Results:  Results for orders placed or performed in visit on 01/16/18 (from the past 24 hour(s))   CBC with platelets   Result Value Ref Range    WBC 6.7 4.0 - 11.0 10e9/L    RBC Count 4.92 3.8 - 5.2 10e12/L    Hemoglobin 14.1 11.7 - 15.7 g/dL    Hematocrit 41.9 35.0 - 47.0 %    MCV 85 78 - 100 fl    MCH 28.7 26.5 - 33.0 pg    MCHC 33.7 31.5 - 36.5 g/dL    RDW 12.7 10.0 - 15.0 %    Platelet Count 298 150 - 450 10e9/L       ASSESSMENT/PLAN:         1. Gastroesophageal reflux disease, esophagitis presence not specified  She will continue with omeprazole 20 mg 1 pill daily we will do abdominal ultrasound if after 1 month of treatment with omeprazole her symptoms have not entirely resolved we will do upper GI endoscopy patient will contact me  - US Abdomen Complete; Future  - CBC with platelets  - Comprehensive metabolic panel (BMP + Alb, Alk Phos, ALT, AST, Total. Bili, TP)    2. Postprandial nausea  We will evaluate with abdominal ultrasound  - US Abdomen " Complete; Future  - CBC with platelets  - Comprehensive metabolic panel (BMP + Alb, Alk Phos, ALT, AST, Total. Bili, TP)    3. Iron deficiency anemia, unspecified iron deficiency anemia type  We will obtain ferritin level and make recommendations on continued iron supplementation recommendations  - Ferritin    This chart documentation was completed in part with Dragon voice recognition software.  Documentation is reviewed after completion, however, some words and grammatical errors may remain.  NADER Roberts PA-C  [facility]  Electronically signed by Sho De La Torre PA-C

## 2018-01-16 ENCOUNTER — OFFICE VISIT (OUTPATIENT)
Dept: FAMILY MEDICINE | Facility: OTHER | Age: 23
End: 2018-01-16
Payer: COMMERCIAL

## 2018-01-16 VITALS
BODY MASS INDEX: 23.05 KG/M2 | HEIGHT: 64 IN | RESPIRATION RATE: 12 BRPM | DIASTOLIC BLOOD PRESSURE: 66 MMHG | WEIGHT: 135 LBS | SYSTOLIC BLOOD PRESSURE: 110 MMHG | TEMPERATURE: 98.5 F | HEART RATE: 70 BPM

## 2018-01-16 DIAGNOSIS — D50.9 IRON DEFICIENCY ANEMIA, UNSPECIFIED IRON DEFICIENCY ANEMIA TYPE: ICD-10-CM

## 2018-01-16 DIAGNOSIS — K21.9 GASTROESOPHAGEAL REFLUX DISEASE, ESOPHAGITIS PRESENCE NOT SPECIFIED: Primary | ICD-10-CM

## 2018-01-16 DIAGNOSIS — R11.0 POSTPRANDIAL NAUSEA: ICD-10-CM

## 2018-01-16 LAB
ALBUMIN SERPL-MCNC: 4.1 G/DL (ref 3.4–5)
ALP SERPL-CCNC: 76 U/L (ref 40–150)
ALT SERPL W P-5'-P-CCNC: 20 U/L (ref 0–50)
ANION GAP SERPL CALCULATED.3IONS-SCNC: 6 MMOL/L (ref 3–14)
AST SERPL W P-5'-P-CCNC: 19 U/L (ref 0–45)
BILIRUB SERPL-MCNC: 0.3 MG/DL (ref 0.2–1.3)
BUN SERPL-MCNC: 11 MG/DL (ref 7–30)
CALCIUM SERPL-MCNC: 9.1 MG/DL (ref 8.5–10.1)
CHLORIDE SERPL-SCNC: 103 MMOL/L (ref 94–109)
CO2 SERPL-SCNC: 28 MMOL/L (ref 20–32)
CREAT SERPL-MCNC: 0.6 MG/DL (ref 0.52–1.04)
ERYTHROCYTE [DISTWIDTH] IN BLOOD BY AUTOMATED COUNT: 12.7 % (ref 10–15)
FERRITIN SERPL-MCNC: 31 NG/ML (ref 12–150)
GFR SERPL CREATININE-BSD FRML MDRD: >90 ML/MIN/1.7M2
GLUCOSE SERPL-MCNC: 84 MG/DL (ref 70–99)
HCT VFR BLD AUTO: 41.9 % (ref 35–47)
HGB BLD-MCNC: 14.1 G/DL (ref 11.7–15.7)
MCH RBC QN AUTO: 28.7 PG (ref 26.5–33)
MCHC RBC AUTO-ENTMCNC: 33.7 G/DL (ref 31.5–36.5)
MCV RBC AUTO: 85 FL (ref 78–100)
PLATELET # BLD AUTO: 298 10E9/L (ref 150–450)
POTASSIUM SERPL-SCNC: 4.3 MMOL/L (ref 3.4–5.3)
PROT SERPL-MCNC: 8.5 G/DL (ref 6.8–8.8)
RBC # BLD AUTO: 4.92 10E12/L (ref 3.8–5.2)
SODIUM SERPL-SCNC: 137 MMOL/L (ref 133–144)
WBC # BLD AUTO: 6.7 10E9/L (ref 4–11)

## 2018-01-16 PROCEDURE — 85027 COMPLETE CBC AUTOMATED: CPT | Performed by: PHYSICIAN ASSISTANT

## 2018-01-16 PROCEDURE — 80053 COMPREHEN METABOLIC PANEL: CPT | Performed by: PHYSICIAN ASSISTANT

## 2018-01-16 PROCEDURE — 99214 OFFICE O/P EST MOD 30 MIN: CPT | Performed by: PHYSICIAN ASSISTANT

## 2018-01-16 PROCEDURE — 82728 ASSAY OF FERRITIN: CPT | Performed by: PHYSICIAN ASSISTANT

## 2018-01-16 PROCEDURE — 36415 COLL VENOUS BLD VENIPUNCTURE: CPT | Performed by: PHYSICIAN ASSISTANT

## 2018-01-16 ASSESSMENT — PAIN SCALES - GENERAL: PAINLEVEL: NO PAIN (0)

## 2018-01-16 NOTE — MR AVS SNAPSHOT
After Visit Summary   1/16/2018    Deanna Leahy    MRN: [id number]           Patient Information     Date Of Birth          1995        Visit Information        Provider Department      1/16/2018 10:45 AM Sho De La Torre PA-C [facility] [place]        Today's Diagnoses     Gastroesophageal reflux disease, esophagitis presence not specified    -  1    Postprandial nausea        Iron deficiency anemia, unspecified iron deficiency anemia type           Follow-ups after your visit        Your next 10 appointments already scheduled     Jan 19, 2018 10:30 AM CST   US ABDOMEN COMPLETE with [id number]   [facility] [place] ([facility])    [address] [address] [phone]           Please bring a list of your medicines (including vitamins, minerals and over-the-counter drugs). Also, tell your doctor about any allergies you may have. Wear comfortable clothes and leave your valuables at home.  Adults: No eating or drinking for 8 hours before the exam. You may take medicine with a small sip of water.  Children: - Children 6+ years: No food or drink for 6 hours before exam. - Children 1-5 years: No food or drink for 4 hours before exam. - Infants, breast-fed: may have breast milk up to 2 hours before exam. - Infants, formula: may have bottle until 4 hours before exam.  Please call the Imaging Department at your exam site with any questions.              Future tests that were ordered for you today     Open Future Orders        Priority Expected Expires Ordered    US Abdomen Complete Routine  1/16/2019 1/16/2018            Who to contact     If you have questions or need follow up information about today's clinic visit or your schedule please contact [facility] directly at [phone].  Normal or non-critical lab and imaging results will be communicated to you by MyChart, letter or phone within 4 business days after the clinic has  "received the results. If you do not hear from us within 7 days, please contact the clinic through [employer] or phone. If you have a critical or abnormal lab result, we will notify you by phone as soon as possible.  Submit refill requests through [employer] or call your pharmacy and they will forward the refill request to us. Please allow 3 business days for your refill to be completed.          Additional Information About Your Visit        [employer] Information     [employer] gives you secure access to your electronic health record. If you see a primary care provider, you can also send messages to your care team and make appointments. If you have questions, please call your primary care clinic.  If you do not have a primary care provider, please call [phone] and they will assist you.        Care EveryWhere ID     This is your Care EveryWhere ID. This could be used by other organizations to access your [place] medical records  [id number]        Your Vitals Were     Pulse Temperature Respirations Height BMI (Body Mass Index)       70 98.5  F (36.9  C) (Temporal) 12 5' 3.5\" (1.613 m) 23.54 kg/m2        Blood Pressure from Last 3 Encounters:   01/16/18 110/66   08/30/17 102/80   05/03/17 110/64    Weight from Last 3 Encounters:   01/16/18 135 lb (61.2 kg)   08/30/17 131 lb 14.4 oz (59.8 kg)   05/03/17 135 lb (61.2 kg)              We Performed the Following     CBC with platelets     Comprehensive metabolic panel (BMP + Alb, Alk Phos, ALT, AST, Total. Bili, TP)     Ferritin        Primary Care Provider Office Phone # Fax #    Sho De La Torre PA-C [phone] [address] [place] [address]        Equal Access to Services     [facility] AH: Hadii kam teague Somichael, waaxda luqadaha, qaybta kaalmada adeegyada, jasmyn manjarrez. So [facility] [phone].    ATENCIÓN: Si habla español, tiene a simons disposición servicios gratuitos de asistencia lingüística. Llame al " [phone].    We comply with applicable federal civil rights laws and [place] laws. We do not discriminate on the basis of race, color, national origin, age, disability, sex, sexual orientation, or gender identity.            Thank you!     Thank you for choosing [facility]  for your care. Our goal is always to provide you with excellent care. Hearing back from our patients is one way we can continue to improve our services. Please take a few minutes to complete the written survey that you may receive in the mail after your visit with us. Thank you!             Your Updated Medication List - Protect others around you: Learn how to safely use, store and throw away your medicines at www.disposemymeds.org.          This list is accurate as of: 1/16/18 11:15 AM.  Always use your most recent med list.                   Brand Name Dispense Instructions for use Diagnosis    ibuprofen 200 MG tablet    ADVIL/MOTRIN    120 tablet    Take 3 tablets (600 mg) by mouth every 6 hours as needed for pain or fever

## 2018-01-19 ENCOUNTER — RADIANT APPOINTMENT (OUTPATIENT)
Dept: ULTRASOUND IMAGING | Facility: OTHER | Age: 23
End: 2018-01-19
Attending: PHYSICIAN ASSISTANT
Payer: COMMERCIAL

## 2018-01-19 DIAGNOSIS — R11.0 POSTPRANDIAL NAUSEA: ICD-10-CM

## 2018-01-19 DIAGNOSIS — K21.9 GASTROESOPHAGEAL REFLUX DISEASE, ESOPHAGITIS PRESENCE NOT SPECIFIED: ICD-10-CM

## 2018-01-19 PROCEDURE — 76700 US EXAM ABDOM COMPLETE: CPT

## 2018-01-22 ENCOUNTER — MYC MEDICAL ADVICE (OUTPATIENT)
Dept: FAMILY MEDICINE | Facility: OTHER | Age: 23
End: 2018-01-22

## 2018-01-29 ENCOUNTER — ALLIED HEALTH/NURSE VISIT (OUTPATIENT)
Dept: FAMILY MEDICINE | Facility: OTHER | Age: 23
End: 2018-01-29
Payer: COMMERCIAL

## 2018-01-29 VITALS — BODY MASS INDEX: 23.54 KG/M2 | WEIGHT: 135 LBS

## 2018-01-29 DIAGNOSIS — Z32.00 PREGNANCY EXAMINATION OR TEST, PREGNANCY UNCONFIRMED: Primary | ICD-10-CM

## 2018-01-29 LAB — BETA HCG QUAL IFA URINE: POSITIVE

## 2018-01-29 PROCEDURE — 84703 CHORIONIC GONADOTROPIN ASSAY: CPT | Performed by: PHYSICIAN ASSISTANT

## 2018-01-29 PROCEDURE — 99207 ZZC NO CHARGE NURSE ONLY: CPT

## 2018-01-29 RX ORDER — PRENATAL VIT/IRON FUM/FOLIC AC 27MG-0.8MG
1 TABLET ORAL DAILY
Qty: 100 TABLET | Refills: 3 | COMMUNITY
Start: 2018-01-29 | End: 2018-10-25

## 2018-01-29 NOTE — MR AVS SNAPSHOT
"              After Visit Summary   1/29/2018    Deanna Leahy    MRN: [id number]           Patient Information     Date Of Birth          1995        Visit Information        Provider Department      1/29/2018 3:00 PM NL RN [facility]        Today's Diagnoses     Pregnancy examination or test, pregnancy unconfirmed    -  1      Care Instructions    According to your last menstrual period, you are approximately 5 weeks and 0 days pregnant.  Your estimated due date is 10/01/2018.    To do list:  1. If you have not already started taking a prenatal vitamin, please start today.  2. Visit with our OB Intake nurse, Carlyn: this can be scheduled any time between today and the first visit with your provider. We do require that you see OB intake before your \"first OB\" visit if you choose to deliver at [facility].          Mondays- [place] (11am-6pm)       Tuesdays- [place] (9am-11am)/ [place] (1pm-3pm)       Wednesdays- [place] (9am-2pm)       Thursdays- [place] (8am-10am)    3. The first OB visit with Dr. Basilio is to be scheduled between 10 and 12 weeks: .  To make these appointments, or if you have any questions and would like to speak to your care team, you can call the clinic's main phone number:       [facility]: [phone]       [facility]: [phone]       [facility]: [phone]       [facility]: [phone]       [facility]: [phone]    Please remember, we would like to hear from you if you have any vaginal bleeding or any moderate to severe abdominal pain/cramping. You can call any of the phone numbers above and speak with an RN promptly, even if it is after clinic hours, someone will answer your call. You can also call the numbers listed in your take home folder from today's visit.     Thank you for choosing [facility] [place], and Congratulations!    Jasper Smith, RN, BSN                    Follow-ups after your visit        Who to " contact     If you have questions or need follow up information about today's clinic visit or your schedule please contact [facility] directly at [phone].  Normal or non-critical lab and imaging results will be communicated to you by [employer], letter or phone within 4 business days after the clinic has received the results. If you do not hear from us within 7 days, please contact the clinic through [employer] or phone. If you have a critical or abnormal lab result, we will notify you by phone as soon as possible.  Submit refill requests through [employer] or call your pharmacy and they will forward the refill request to us. Please allow 3 business days for your refill to be completed.          Additional Information About Your Visit        [employer] Information     [employer] gives you secure access to your electronic health record. If you see a primary care provider, you can also send messages to your care team and make appointments. If you have questions, please call your primary care clinic.  If you do not have a primary care provider, please call [phone] and they will assist you.        Care EveryWhere ID     This is your Care EveryWhere ID. This could be used by other organizations to access your [place] medical records  [id number]        Your Vitals Were     Last Period Breastfeeding?                12/25/2017 (Exact Date) No           Blood Pressure from Last 3 Encounters:   01/16/18 110/66   08/30/17 102/80   05/03/17 110/64    Weight from Last 3 Encounters:   01/16/18 135 lb (61.2 kg)   08/30/17 131 lb 14.4 oz (59.8 kg)   05/03/17 135 lb (61.2 kg)              We Performed the Following     Beta HCG qual IFA urine - [facility] and [place]          Today's Medication Changes          These changes are accurate as of 1/29/18  3:24 PM.  If you have any questions, ask your nurse or doctor.               Stop taking these medicines if you haven't already. Please contact your care team if you have  questions.     ibuprofen 200 MG tablet   Commonly known as:  ADVIL/MOTRIN                    Primary Care Provider Office Phone # Fax #    Sho De La Torre PA-C [phone] [address] [place] [address]        Equal Access to Services     CARLOS BELTRAN : Hadii kam ku hadhelgao Soomaali, waaxda luqadaha, qaybta kaalmada adeegyada, waxay idiin haycotyn ademelissa castellanosdavy manjarrez. So [facility] [phone].    ATENCIÓN: Si habla español, tiene a simons disposición servicios gratuitos de asistencia lingüística. Llame al [phone].    We comply with applicable federal civil rights laws and [place] laws. We do not discriminate on the basis of race, color, national origin, age, disability, sex, sexual orientation, or gender identity.            Thank you!     Thank you for choosing [facility]  for your care. Our goal is always to provide you with excellent care. Hearing back from our patients is one way we can continue to improve our services. Please take a few minutes to complete the written survey that you may receive in the mail after your visit with us. Thank you!             Your Updated Medication List - Protect others around you: Learn how to safely use, store and throw away your medicines at www.disposemymeds.org.          This list is accurate as of 1/29/18  3:24 PM.  Always use your most recent med list.                   Brand Name Dispense Instructions for use Diagnosis    prenatal multivitamin plus iron 27-0.8 MG Tabs per tablet     100 tablet    Take 1 tablet by mouth daily    Pregnancy examination or test, pregnancy unconfirmed

## 2018-01-29 NOTE — PROGRESS NOTES
Deanna Leahy is a 23 year old here today for a pregnancy test.  LMP: Patient's last menstrual period was 2017 (exact date).  Wt: 135 lbs 0 oz.    Symptoms include absence of menses, fatigue and cramping that comes and goes.    Deanna informed of positive pregnancy test results. CATHY: 10/01/2018    Educational advice given: nutrition, smoking and drugs & alcohol.    Current medications reviewed: Yes    Previous pregnancy history remarkable for: MAB 2017 summer.    Plan: schedule appointment with OB Educator and/or OB class, follow-up appointment with Dr. Basilio for pre-abram care, take multivitamin or pre-abram vitamins and OB Education packet given.    She is to call back if she has any questions or concerns.  She is advised to notify a provider immediately if she experiences any severe cramping or abdominal pain or any vaginal bleeding.

## 2018-01-29 NOTE — PATIENT INSTRUCTIONS
"According to your last menstrual period, you are approximately 5 weeks and 0 days pregnant.  Your estimated due date is 10/01/2018.    To do list:  1. If you have not already started taking a prenatal vitamin, please start today.  2. Visit with our OB Intake nurse, Carlyn: this can be scheduled any time between today and the first visit with your provider. We do require that you see OB intake before your \"first OB\" visit if you choose to deliver at [facility].          Mondays- [place] (11am-6pm)       Tuesdays- [place] (9am-11am)/ [place] (1pm-3pm)       Wednesdays- [place] (9am-2pm)       Thursdays- [place] (8am-10am)    3. The first OB visit with Dr. Basilio is to be scheduled between 10 and 12 weeks: .  To make these appointments, or if you have any questions and would like to speak to your care team, you can call the clinic's main phone number:       [facility]: [phone]       [facility]: [phone]       [facility]: [phone]       [facility]: [phone]       [facility]: [phone]    Please remember, we would like to hear from you if you have any vaginal bleeding or any moderate to severe abdominal pain/cramping. You can call any of the phone numbers above and speak with an RN promptly, even if it is after clinic hours, someone will answer your call. You can also call the numbers listed in your take home folder from today's visit.     Thank you for choosing [place], and Congratulations!    Jasper Smith, RN, BSN            "

## 2018-02-06 ENCOUNTER — PRENATAL OFFICE VISIT (OUTPATIENT)
Dept: FAMILY MEDICINE | Facility: OTHER | Age: 23
End: 2018-02-06
Payer: COMMERCIAL

## 2018-02-06 VITALS — HEIGHT: 63 IN | BODY MASS INDEX: 23.57 KG/M2 | WEIGHT: 133 LBS

## 2018-02-06 DIAGNOSIS — Z32.01 PREGNANCY, CONFIRMED, NOT FIRST: Primary | ICD-10-CM

## 2018-02-06 LAB
ABO + RH BLD: NORMAL
ABO + RH BLD: NORMAL
ALBUMIN UR-MCNC: NEGATIVE MG/DL
APPEARANCE UR: CLEAR
BILIRUB UR QL STRIP: NEGATIVE
BLD GP AB SCN SERPL QL: NORMAL
BLOOD BANK CMNT PATIENT-IMP: NORMAL
COLOR UR AUTO: YELLOW
GLUCOSE UR STRIP-MCNC: NEGATIVE MG/DL
HBV SURFACE AG SERPL QL IA: NONREACTIVE
HGB UR QL STRIP: NEGATIVE
HIV 1+2 AB+HIV1 P24 AG SERPL QL IA: NONREACTIVE
KETONES UR STRIP-MCNC: NEGATIVE MG/DL
LEUKOCYTE ESTERASE UR QL STRIP: NEGATIVE
NITRATE UR QL: NEGATIVE
PH UR STRIP: 6.5 PH (ref 5–7)
SOURCE: NORMAL
SP GR UR STRIP: 1.02 (ref 1–1.03)
SPECIMEN EXP DATE BLD: NORMAL
UROBILINOGEN UR STRIP-ACNC: 0.2 EU/DL (ref 0.2–1)

## 2018-02-06 PROCEDURE — 86901 BLOOD TYPING SEROLOGIC RH(D): CPT | Performed by: FAMILY MEDICINE

## 2018-02-06 PROCEDURE — 87086 URINE CULTURE/COLONY COUNT: CPT | Performed by: FAMILY MEDICINE

## 2018-02-06 PROCEDURE — 87389 HIV-1 AG W/HIV-1&-2 AB AG IA: CPT | Performed by: FAMILY MEDICINE

## 2018-02-06 PROCEDURE — 99207 ZZC NO CHARGE LOS: CPT

## 2018-02-06 PROCEDURE — 87340 HEPATITIS B SURFACE AG IA: CPT | Performed by: FAMILY MEDICINE

## 2018-02-06 PROCEDURE — 81003 URINALYSIS AUTO W/O SCOPE: CPT | Performed by: FAMILY MEDICINE

## 2018-02-06 PROCEDURE — 36415 COLL VENOUS BLD VENIPUNCTURE: CPT | Performed by: FAMILY MEDICINE

## 2018-02-06 PROCEDURE — 86900 BLOOD TYPING SEROLOGIC ABO: CPT | Performed by: FAMILY MEDICINE

## 2018-02-06 PROCEDURE — 86762 RUBELLA ANTIBODY: CPT | Performed by: FAMILY MEDICINE

## 2018-02-06 PROCEDURE — 86850 RBC ANTIBODY SCREEN: CPT | Performed by: FAMILY MEDICINE

## 2018-02-06 PROCEDURE — 86780 TREPONEMA PALLIDUM: CPT | Performed by: FAMILY MEDICINE

## 2018-02-06 NOTE — PROGRESS NOTES
1. Have you had chicken pox?   No - reports she received varicella vaccine.    Genetic Screening    Has the patient, baby's father, or anyone in either family had:     1. Thalassemia and and MCV result less than 80?No   2.  Neural tube defect? No   3.  Congenital heart defect?No   4. Down's syndrome?No   5.  Blaine-Sachs disease?No   6. Sickle Cell disease or trait?No   7. Hemophilia or other inherited problems of blood?No   8. Muscular dystropy?No   9.  Cystic fibrosis?No  10. [place]'s Chorea?No  11. Mental Retardation or autism?  No         If yes, was the person tested for Fragile X?   12. Any other inherited genetic or chromosomal disorders?No  13. Metabolic disorders such as diabetes or PKU?No  14. A child born with defects not listed above?No  15. Recurrent pregnancy loss or stillbirth?No  16.  Has the patient had any medications/street drugs/alcohol since her last menstrual period?No  18.  Does the patient or baby's father have any other genetic risks. No

## 2018-02-06 NOTE — MR AVS SNAPSHOT
After Visit Summary   2/6/2018    Deanna Leahy    MRN: [id number]           Patient Information     Date Of Birth          1995        Visit Information        Provider Department      2/6/2018 10:00 AM [facility]        Today's Diagnoses     Pregnancy, confirmed, not first    -  1       Follow-ups after your visit        Your next 10 appointments already scheduled     Feb 20, 2018  4:45 PM CST   US OB < 14 WEEKS SINGLE with PHUS1   [facility] ([facility])    [address] [id number] [phone]           Please bring a list of your medicines (including vitamins, minerals and over-the-counter drugs). Also, tell your doctor about any allergies you may have. Wear comfortable clothes and leave your valuables at home.  If you re less than 20 weeks drink four 8-ounce glasses of fluid an hour before your exam. If you need to empty your bladder before your exam, try to release only a little urine. Then, drink another glass of fluid.  You may have up to two family members in the exam room. If you bring a small child, an adult must be there to care for him or her.  Please call the Imaging Department at your exam site with any questions.            Mar 02, 2018  3:30 PM CST   New Prenatal with Romulo Basilio MD   [facility]    [address] [place] [address] [phone]              Future tests that were ordered for you today     Open Future Orders        Priority Expected Expires Ordered    US OB < 14 Weeks Single Routine  2/6/2019 2/6/2018            Who to contact     If you have questions or need follow up information about today's clinic visit or your schedule please contact [facility] directly at [phone].  Normal or non-critical lab and imaging results will be communicated to you by MyChart, letter or phone within 4  "business days after the clinic has received the results. If you do not hear from us within 7 days, please contact the clinic through [employer] or phone. If you have a critical or abnormal lab result, we will notify you by phone as soon as possible.  Submit refill requests through [employer] or call your pharmacy and they will forward the refill request to us. Please allow 3 business days for your refill to be completed.          Additional Information About Your Visit        [employer] Information     [employer] gives you secure access to your electronic health record. If you see a primary care provider, you can also send messages to your care team and make appointments. If you have questions, please call your primary care clinic.  If you do not have a primary care provider, please call [phone] and they will assist you.        Care EveryWhere ID     This is your Care EveryWhere ID. This could be used by other organizations to access your [place] medical records  [id number]        Your Vitals Were     Height Last Period BMI (Body Mass Index)             5' 2.5\" (1.588 m) 12/25/2017 (Exact Date) 23.94 kg/m2          Blood Pressure from Last 3 Encounters:   01/16/18 110/66   08/30/17 102/80   05/03/17 110/64    Weight from Last 3 Encounters:   02/06/18 133 lb (60.3 kg)   01/29/18 135 lb (61.2 kg)   01/16/18 135 lb (61.2 kg)              We Performed the Following     ABO/Rh type and screen     Anti treponema EIA     HEPATITIS B SURFACE ANTIGEN     HIV Antigen Antibody Combo     Rubella Antibody IgG Quantitative     Urine Culture Aerobic Bacterial     URINE MACROSCOPIC WITH REFLEX TO MICRO        Primary Care Provider Office Phone # Fax #    Sho De La Torre PA-C [phone] [address] [place] [address]        Equal Access to Services     CARLOS BELTRAN : Franci Wood, hollie banuelos, halle melara, jasmyn manjarrez. So wa " [phone].    ATENCIÓN: Si belkis thomas, tiene a simons disposición servicios gratuitos de asistencia lingüística. Antonio al [phone].    We comply with applicable federal civil rights laws and [place] laws. We do not discriminate on the basis of race, color, national origin, age, disability, sex, sexual orientation, or gender identity.            Thank you!     Thank you for choosing [facility]  for your care. Our goal is always to provide you with excellent care. Hearing back from our patients is one way we can continue to improve our services. Please take a few minutes to complete the written survey that you may receive in the mail after your visit with us. Thank you!             Your Updated Medication List - Protect others around you: Learn how to safely use, store and throw away your medicines at www.disposemymeds.org.          This list is accurate as of 2/6/18 10:56 AM.  Always use your most recent med list.                   Brand Name Dispense Instructions for use Diagnosis    prenatal multivitamin plus iron 27-0.8 MG Tabs per tablet     100 tablet    Take 1 tablet by mouth daily    Pregnancy examination or test, pregnancy unconfirmed

## 2018-02-07 LAB
BACTERIA SPEC CULT: NORMAL
Lab: NORMAL
RUBV IGG SERPL IA-ACNC: 30 IU/ML
SPECIMEN SOURCE: NORMAL
T PALLIDUM IGG+IGM SER QL: NEGATIVE

## 2018-02-07 NOTE — PROGRESS NOTES
Deanna,    Congratulations on your pregnancy!  Your labs look great!  Your blood type is AB negative.  We'll discuss this further at your OB visits, but please contact me if you have any vaginal bleeding or abdominal trauma during your pregnancy.  This may require treatment to protect your baby because of your blood type.    Have a nice day!    Dr. Basilio

## 2018-02-20 ENCOUNTER — HOSPITAL ENCOUNTER (OUTPATIENT)
Dept: ULTRASOUND IMAGING | Facility: CLINIC | Age: 23
Discharge: HOME | End: 2018-02-20
Attending: FAMILY MEDICINE | Admitting: FAMILY MEDICINE
Payer: COMMERCIAL

## 2018-02-20 DIAGNOSIS — Z32.01 PREGNANCY, CONFIRMED, NOT FIRST: ICD-10-CM

## 2018-02-20 PROCEDURE — 76801 OB US < 14 WKS SINGLE FETUS: CPT

## 2018-02-21 NOTE — PROGRESS NOTES
Deanna,    Your ultrasound looks good.  There is a little bleeding noted from the edge of the placenta.  This is not unusual at this stage of pregnancy and may not cause you any problems.  It can cause a little bleeding vaginally.  If that happens, please let me know.  This bleeding usually fully resolves on it's own.  Let me know if you have any questions.    Have a nice day!    Dr. Basilio

## 2018-03-02 ENCOUNTER — PRENATAL OFFICE VISIT (OUTPATIENT)
Dept: FAMILY MEDICINE | Facility: OTHER | Age: 23
End: 2018-03-02
Payer: COMMERCIAL

## 2018-03-02 VITALS
DIASTOLIC BLOOD PRESSURE: 74 MMHG | HEIGHT: 63 IN | HEART RATE: 84 BPM | BODY MASS INDEX: 22.86 KG/M2 | RESPIRATION RATE: 16 BRPM | WEIGHT: 129 LBS | TEMPERATURE: 97.8 F | SYSTOLIC BLOOD PRESSURE: 108 MMHG

## 2018-03-02 DIAGNOSIS — Z34.80 ENCOUNTER FOR SUPERVISION OF OTHER NORMAL PREGNANCY, UNSPECIFIED TRIMESTER: Primary | ICD-10-CM

## 2018-03-02 DIAGNOSIS — O26.899 RH NEGATIVE STATE IN ANTEPARTUM PERIOD, UNSPECIFIED TRIMESTER: ICD-10-CM

## 2018-03-02 DIAGNOSIS — Z67.91 RH NEGATIVE STATE IN ANTEPARTUM PERIOD, UNSPECIFIED TRIMESTER: ICD-10-CM

## 2018-03-02 PROCEDURE — 87591 N.GONORRHOEAE DNA AMP PROB: CPT | Performed by: FAMILY MEDICINE

## 2018-03-02 PROCEDURE — 87491 CHLMYD TRACH DNA AMP PROBE: CPT | Performed by: FAMILY MEDICINE

## 2018-03-02 PROCEDURE — 99207 ZZC FIRST OB VISIT: CPT | Performed by: FAMILY MEDICINE

## 2018-03-02 ASSESSMENT — PAIN SCALES - GENERAL: PAINLEVEL: NO PAIN (0)

## 2018-03-02 NOTE — PROGRESS NOTES
"    SUBJECTIVE: Deanna Leahy is an 23 year old female here for initial OB visit.    Breast tenderness, nausea, fatigue.  Fatigue has improved a bit.  Not much emesis, but lots of nausea.  Does get some cramping, but just when really active.      Past Medical History of Father of Baby: No significant medical history    Past Medical History:   Diagnosis Date     NO ACTIVE PROBLEMS        Past Surgical History:   Procedure Laterality Date     NO HISTORY OF SURGERY         Family History   Problem Relation Age of Onset     C.A.D. Maternal Grandfather      50's?     Hyperlipidemia Mother      Anemia Mother      Crohn Disease Father      CANCER Paternal Grandmother      lung     Prostate Cancer Paternal Grandfather        Social History   Substance Use Topics     Smoking status: Never Smoker     Smokeless tobacco: Never Used     Alcohol use No        Review of Systems:   Constitutional, HEENT, cardiovascular, pulmonary, gi and gu systems are negative, except as otherwise noted.     History Since Last Menstrual Period: No Problems    EXAM: Blood pressure 108/74, pulse 84, temperature 97.8  F (36.6  C), temperature source Temporal, resp. rate 16, height 5' 2.5\" (1.588 m), weight 129 lb (58.5 kg), last menstrual period 12/25/2017, not currently breastfeeding.  GENERAL APPEARANCE: healthy, alert and no distress  EYES: EOMI,  PERRL  HENT: ear canals and TM's normal and nose and mouth without ulcers or lesions  NECK: no adenopathy, no asymmetry, masses, or scars and thyroid normal to palpation  RESP: lungs clear to auscultation - no rales, rhonchi or wheezes  BREAST: normal without masses, tenderness or nipple discharge and no palpable axillary masses or adenopathy  CV: regular rates and rhythm, normal S1 S2, no S3 or S4 and no murmur, click or rub -  ABDOMEN:  soft, nontender, no HSM or masses and bowel sounds normal  MS: extremities normal- no gross deformities noted, no evidence of inflammation in joints, FROM in all " extremities.  SKIN: no suspicious lesions or rashes  NEURO: Normal strength and tone, sensory exam grossly normal, mentation intact and speech normal  PSYCH: mentation appears normal and affect normal/bright    P[employer] exam:  Perineum: Intact;   Vulva: Normal;  Vagina: Normal mucosa, no concerning discharge  Cervix: Nulliparous, closed, mobile,  no discharge;  Uterus: 10 weeks,  Retroverted, mobile;   Adnexa: No masses, nodularity, tenderness;  Rectum: Normal without lesion or mass;   Bony Pelvis: Adequate.     ASSESSMENT/ PLAN:  Follow up in 4 weeks.  Normal exercise.  Normal sexual activity.  Prenatal vitamins.  Discussed that if she has issues with nausea, we will adjusted as needed.  Currently she is coping well.  Discussed her Rh- status and need for RhoGam if she has any bleeding or abdominal trauma during pregnancy.  She can contact us with any questions.  Her initial ultrasound was reassuring.  She did have a small subchorionic hemorrhage but she has remained asymptomatic.  We also discussed additional screening testing that she can consider.  She is not interested in pursuing any of these at this time.    Portions of this note were completed using Dragon dictation software.  Although reviewed, there may be typographical and other inadvertent errors that remain.

## 2018-03-02 NOTE — PATIENT INSTRUCTIONS
Thank you for visiting [facility]    We'll let you know your lab results as soon as we can.     Let me know if problems or concerns.    Please see me in 4 weeks for follow up.     Let me know if you want more information about additional screening tests.    If you had imaging scheduled please refer to your radiology prep sheet.    Appointment    Date_______________     Time_____________    Day:   M TU W TH F    With____________________________    Location_________________________    If you need medication refills, please contact your pharmacy 3 days before your prescriptions runs out. If you are out of refills, your pharmacy will contact contact the clinic.    Contact us or return if questions or concerns.     -Your Care Team:  MD Sho Hooks PA-C Joel De Haan, PA-C Elizabeth McLean, APRN CNP    General information about your clinic      Clinic hours:     Lab hours:  Phone [phone]  Monday 7:30 am-7 pm    Monday 8:30 am-6:30 pm  Tuesday-Friday 7:30 am-5 pm   Tuesday-Friday 8:30 am-4:30 pm    Pharmacy hours:  Phone [phone]  Monday 8:30 am-7pm  Tuesday-Friday 8:30am-6 pm                                       Mychart assistance [phone]        We would like to hear from you, how was your visit today?    Dionne Phan  Patient Information Supervisor   Patient Care Supervisor  [facility] Shannan [facility], and [facility], and [facility]  [phone]

## 2018-03-02 NOTE — NURSING NOTE
"Chief Complaint   Patient presents with     Prenatal Care     Panel Management     chlamydia       Initial /74 (BP Location: Left arm, Patient Position: Chair, Cuff Size: Adult Regular)  Pulse 84  Temp 97.8  F (36.6  C) (Temporal)  Resp 16  Ht 5' 2.5\" (1.588 m)  Wt 129 lb (58.5 kg)  LMP 12/25/2017 (Exact Date)  BMI 23.22 kg/m2 Estimated body mass index is 23.22 kg/(m^2) as calculated from the following:    Height as of this encounter: 5' 2.5\" (1.588 m).    Weight as of this encounter: 129 lb (58.5 kg).  Medication Reconciliation: complete  Nathanael Soto, RAMONA   "

## 2018-03-02 NOTE — MR AVS SNAPSHOT
After Visit Summary   3/2/2018    Deanna Leahy    MRN: [id number]           Patient Information     Date Of Birth          1995        Visit Information        Provider Department      3/2/2018 3:30 PM Romulo Basilio MD [facility]        Today's Diagnoses     Encounter for supervision of other normal pregnancy, unspecified trimester    -  1      Care Instructions    Thank you for visiting [facility] [place]    We'll let you know your lab results as soon as we can.     Let me know if problems or concerns.    Please see me in 4 weeks for follow up.     Let me know if you want more information about additional screening tests.    If you had imaging scheduled please refer to your radiology prep sheet.    Appointment    Date_______________     Time_____________    Day:   M TU W TH F    With____________________________    Location_________________________    If you need medication refills, please contact your pharmacy 3 days before your prescriptions runs out. If you are out of refills, your pharmacy will contact contact the clinic.    Contact us or return if questions or concerns.     -Your Care Team:  MD Sho Hooks PA-C Joel De Haan, PA-C Elizabeth McLean, APRN CNP    General information about your clinic      Clinic hours:     Lab hours:  Phone [phone]  Monday 7:30 am-7 pm    Monday 8:30 am-6:30 pm  Tuesday-Friday 7:30 am-5 pm   Tuesday-Friday 8:30 am-4:30 pm    Pharmacy hours:  Phone [phone]  Monday 8:30 am-7pm  Tuesday-Friday 8:30am-6 pm                                       Mychart assistance [phone]        We would like to hear from you, how was your visit today?    Dionne Phan  Patient Information Supervisor   Patient Care Supervisor  [employer] [facility], and [facility], and [facility]  [phone]             Follow-ups after your  "visit        Who to contact     If you have questions or need follow up information about today's clinic visit or your schedule please contact [facility] [place] directly at [phone].  Normal or non-critical lab and imaging results will be communicated to you by MyChart, letter or phone within 4 business days after the clinic has received the results. If you do not hear from us within 7 days, please contact the clinic through MyChart or phone. If you have a critical or abnormal lab result, we will notify you by phone as soon as possible.  Submit refill requests through [employer] or call your pharmacy and they will forward the refill request to us. Please allow 3 business days for your refill to be completed.          Additional Information About Your Visit        SIP[employer]omo Information     [employer] gives you secure access to your electronic health record. If you see a primary care provider, you can also send messages to your care team and make appointments. If you have questions, please call your primary care clinic.  If you do not have a primary care provider, please call [phone] and they will assist you.        Care EveryWhere ID     This is your Care EveryWhere ID. This could be used by other organizations to access your [place] medical records  [id number]        Your Vitals Were     Pulse Temperature Respirations Height Last Period BMI (Body Mass Index)    84 97.8  F (36.6  C) (Temporal) 16 5' 2.5\" (1.588 m) 12/25/2017 (Exact Date) 23.22 kg/m2       Blood Pressure from Last 3 Encounters:   03/02/18 108/74   01/16/18 110/66   08/30/17 102/80    Weight from Last 3 Encounters:   03/02/18 129 lb (58.5 kg)   02/06/18 133 lb (60.3 kg)   01/29/18 135 lb (61.2 kg)              We Performed the Following     Chlamydia Tracomatis PCR     Neisseria Gonorrhoea PCR        Primary Care Provider Office Phone # Fax #    Sho De La Torre PA-C [phone] [address] [place] [address]      "   Equal Access to Services     [facility] Hospital[facility] : Hadii aad ku hadhelgajohny Daphniemichael, watioda luqdontrellha, qachiquista isacaseyjasmyn yi. So [facility] [phone].    ATENCIÓN: Si habla español, tiene a simons disposición servicios gratuitos de asistencia lingüística. Llame al [phone].    We comply with applicable federal civil rights laws and [place] laws. We do not discriminate on the basis of race, color, national origin, age, disability, sex, sexual orientation, or gender identity.            Thank you!     Thank you for choosing [facility]  for your care. Our goal is always to provide you with excellent care. Hearing back from our patients is one way we can continue to improve our services. Please take a few minutes to complete the written survey that you may receive in the mail after your visit with us. Thank you!             Your Updated Medication List - Protect others around you: Learn how to safely use, store and throw away your medicines at www.disposemymeds.org.          This list is accurate as of 3/2/18  4:26 PM.  Always use your most recent med list.                   Brand Name Dispense Instructions for use Diagnosis    prenatal multivitamin plus iron 27-0.8 MG Tabs per tablet     100 tablet    Take 1 tablet by mouth daily    Pregnancy examination or test, pregnancy unconfirmed

## 2018-03-03 PROBLEM — Z34.80 ENCOUNTER FOR SUPERVISION OF OTHER NORMAL PREGNANCY, UNSPECIFIED TRIMESTER: Status: ACTIVE | Noted: 2018-03-03

## 2018-03-03 PROBLEM — Z67.91 RH NEGATIVE STATE IN ANTEPARTUM PERIOD, UNSPECIFIED TRIMESTER: Status: ACTIVE | Noted: 2018-03-03

## 2018-03-03 PROBLEM — O26.899 RH NEGATIVE STATE IN ANTEPARTUM PERIOD, UNSPECIFIED TRIMESTER: Status: ACTIVE | Noted: 2018-03-03

## 2018-03-04 LAB
C TRACH DNA SPEC QL NAA+PROBE: NEGATIVE
N GONORRHOEA DNA SPEC QL NAA+PROBE: NEGATIVE
SPECIMEN SOURCE: NORMAL
SPECIMEN SOURCE: NORMAL

## 2018-03-30 ENCOUNTER — PRENATAL OFFICE VISIT (OUTPATIENT)
Dept: FAMILY MEDICINE | Facility: OTHER | Age: 23
End: 2018-03-30
Payer: COMMERCIAL

## 2018-03-30 VITALS
HEIGHT: 63 IN | RESPIRATION RATE: 16 BRPM | SYSTOLIC BLOOD PRESSURE: 100 MMHG | HEART RATE: 80 BPM | TEMPERATURE: 97.2 F | BODY MASS INDEX: 23.76 KG/M2 | DIASTOLIC BLOOD PRESSURE: 64 MMHG | WEIGHT: 134.1 LBS

## 2018-03-30 DIAGNOSIS — O26.899 RH NEGATIVE STATE IN ANTEPARTUM PERIOD, UNSPECIFIED TRIMESTER: ICD-10-CM

## 2018-03-30 DIAGNOSIS — Z67.91 RH NEGATIVE STATE IN ANTEPARTUM PERIOD, UNSPECIFIED TRIMESTER: ICD-10-CM

## 2018-03-30 DIAGNOSIS — Z34.80 ENCOUNTER FOR SUPERVISION OF OTHER NORMAL PREGNANCY, UNSPECIFIED TRIMESTER: Primary | ICD-10-CM

## 2018-03-30 DIAGNOSIS — K21.9 GASTROESOPHAGEAL REFLUX DISEASE, ESOPHAGITIS PRESENCE NOT SPECIFIED: ICD-10-CM

## 2018-03-30 PROCEDURE — 99207 ZZC PRENATAL VISIT: CPT | Performed by: FAMILY MEDICINE

## 2018-03-30 ASSESSMENT — PAIN SCALES - GENERAL: PAINLEVEL: NO PAIN (0)

## 2018-03-30 ASSESSMENT — PATIENT HEALTH QUESTIONNAIRE - PHQ9
SUM OF ALL RESPONSES TO PHQ QUESTIONS 1-9: 4
SUM OF ALL RESPONSES TO PHQ QUESTIONS 1-9: 4
10. IF YOU CHECKED OFF ANY PROBLEMS, HOW DIFFICULT HAVE THESE PROBLEMS MADE IT FOR YOU TO DO YOUR WORK, TAKE CARE OF THINGS AT HOME, OR GET ALONG WITH OTHER PEOPLE: SOMEWHAT DIFFICULT

## 2018-03-30 ASSESSMENT — ANXIETY QUESTIONNAIRES
1. FEELING NERVOUS, ANXIOUS, OR ON EDGE: NOT AT ALL
3. WORRYING TOO MUCH ABOUT DIFFERENT THINGS: NOT AT ALL
GAD7 TOTAL SCORE: 0
5. BEING SO RESTLESS THAT IT IS HARD TO SIT STILL: NOT AT ALL
GAD7 TOTAL SCORE: 0
7. FEELING AFRAID AS IF SOMETHING AWFUL MIGHT HAPPEN: NOT AT ALL
7. FEELING AFRAID AS IF SOMETHING AWFUL MIGHT HAPPEN: NOT AT ALL
6. BECOMING EASILY ANNOYED OR IRRITABLE: NOT AT ALL
GAD7 TOTAL SCORE: 0
2. NOT BEING ABLE TO STOP OR CONTROL WORRYING: NOT AT ALL
4. TROUBLE RELAXING: NOT AT ALL

## 2018-03-30 NOTE — PROGRESS NOTES
Still with lots of nausea.  Denies significant vomiting.  Doing well.  No concerns today.  Prenatal flowsheet information is reviewed.  Discussed triple/quad screening.  She declines testing at this time.  She is Rh NEGATIVE.  Will require RhoGam.  Reportable signs and symptoms discussed.  F/u in 4 weeks.

## 2018-03-30 NOTE — PATIENT INSTRUCTIONS
Thank you for visiting [facility]    Keep up the good work!    Please see me in 4 weeks for follow up.       If you had imaging scheduled please refer to your radiology prep sheet.    Appointment    Date_______________     Time_____________    Day:   M TU W TH F    With____________________________    Location_________________________    If you need medication refills, please contact your pharmacy 3 days before your prescriptions runs out. If you are out of refills, your pharmacy will contact contact the clinic.    Contact us or return if questions or concerns.     -Your Care Team:  MD Sho Hooks PA-C Joel De Haan, PA-C Elizabeth McLean, APRN CNP    General information about your clinic      Clinic hours:     Lab hours:  Phone [phone]  Monday 7:30 am-7 pm    Monday 8:30 am-6:30 pm  Tuesday-Friday 7:30 am-5 pm   Tuesday-Friday 8:30 am-4:30 pm    Pharmacy hours:  Phone [phone]  Monday 8:30 am-7pm  Tuesday-Friday 8:30am-6 pm                                       Mychart assistance [phone]        We would like to hear from you, how was your visit today?    Dionne Phan  Patient Information Supervisor   Patient Care Supervisor  [facility] Shannan Sandwich, and [facility], and [facility]  [phone]

## 2018-03-30 NOTE — NURSING NOTE
"Chief Complaint   Patient presents with     Prenatal Care     Panel Management       Initial /64 (BP Location: Left arm, Patient Position: Chair, Cuff Size: Adult Regular)  Pulse 80  Temp 97.2  F (36.2  C) (Temporal)  Resp 16  Ht 5' 2.5\" (1.588 m)  Wt 134 lb 1.6 oz (60.8 kg)  LMP 12/25/2017 (Exact Date)  BMI 24.14 kg/m2 Estimated body mass index is 24.14 kg/(m^2) as calculated from the following:    Height as of this encounter: 5' 2.5\" (1.588 m).    Weight as of this encounter: 134 lb 1.6 oz (60.8 kg).  Medication Reconciliation: complete  Nathanael Soto, RAMONA      "

## 2018-03-30 NOTE — MR AVS SNAPSHOT
After Visit Summary   3/30/2018    Deanna Leahy    MRN: [id number]           Patient Information     Date Of Birth          1995        Visit Information        Provider Department      3/30/2018 8:00 AM Romulo Basilio MD [facility]        Care Instructions    Thank you for visiting [facility]    Keep up the good work!    Please see me in 4 weeks for follow up.       If you had imaging scheduled please refer to your radiology prep sheet.    Appointment    Date_______________     Time_____________    Day:   M TU W TH F    With____________________________    Location_________________________    If you need medication refills, please contact your pharmacy 3 days before your prescriptions runs out. If you are out of refills, your pharmacy will contact contact the clinic.    Contact us or return if questions or concerns.     -Your Care Team:  MD Sho Hooks PA-C Joel De Haan, PA-C Elizabeth McLean, APRN CNP    General information about your clinic      Clinic hours:     Lab hours:  Phone [phone]  Monday 7:30 am-7 pm    Monday 8:30 am-6:30 pm  Tuesday-Friday 7:30 am-5 pm   Tuesday-Friday 8:30 am-4:30 pm    Pharmacy hours:  Phone [phone]  Monday 8:30 am-7pm  Tuesday-Friday 8:30am-6 pm                                       Mychart assistance [phone]        We would like to hear from you, how was your visit today?    Dionne Phan  Patient Information Supervisor   Patient Care Supervisor  [facility] Shannan [place], and [facility]ann[facility] [place], and [facility]  [phone]             Follow-ups after your visit        Who to contact     If you have questions or need follow up information about today's clinic visit or your schedule please contact [facility] directly at [phone].  Normal or non-critical lab and imaging results will be  "communicated to you by Meridian Systemshart, letter or phone within 4 business days after the clinic has received the results. If you do not hear from us within 7 days, please contact the clinic through Appointuit or phone. If you have a critical or abnormal lab result, we will notify you by phone as soon as possible.  Submit refill requests through App[employer]uit or call your pharmacy and they will forward the refill request to us. Please allow 3 business days for your refill to be completed.          Additional Information About Your Visit        Appointuit Information     [employer] gives you secure access to your electronic health record. If you see a primary care provider, you can also send messages to your care team and make appointments. If you have questions, please call your primary care clinic.  If you do not have a primary care provider, please call [phone] and they will assist you.        Care EveryWhere ID     This is your Care EveryWhere ID. This could be used by other organizations to access your [place] medical records  [id number]        Your Vitals Were     Pulse Temperature Respirations Height Last Period BMI (Body Mass Index)    80 97.2  F (36.2  C) (Temporal) 16 5' 2.5\" (1.588 m) 12/25/2017 (Exact Date) 24.14 kg/m2       Blood Pressure from Last 3 Encounters:   03/30/18 100/64   03/02/18 108/74   01/16/18 110/66    Weight from Last 3 Encounters:   03/30/18 134 lb 1.6 oz (60.8 kg)   03/02/18 129 lb (58.5 kg)   02/06/18 133 lb (60.3 kg)              Today, you had the following     No orders found for display       Primary Care Provider Office Phone # Fax #    Sho De La Torre PA-C [phone] [address] [place] [address]        Equal Access to Services     CARLOS BELTRAN : Franci Wood, hollie banuelos, halle kaalmajasmyn yi. So [facility] [phone].    ATENCIÓN: Si habla español, tiene a simons disposición servicios gratuitos de " asistencia lingüística. Antonio al [phone].    We comply with applicable federal civil rights laws and [place] laws. We do not discriminate on the basis of race, color, national origin, age, disability, sex, sexual orientation, or gender identity.            Thank you!     Thank you for choosing [facility]  for your care. Our goal is always to provide you with excellent care. Hearing back from our patients is one way we can continue to improve our services. Please take a few minutes to complete the written survey that you may receive in the mail after your visit with us. Thank you!             Your Updated Medication List - Protect others around you: Learn how to safely use, store and throw away your medicines at www.disposemymeds.org.          This list is accurate as of 3/30/18  8:16 AM.  Always use your most recent med list.                   Brand Name Dispense Instructions for use Diagnosis    prenatal multivitamin plus iron 27-0.8 MG Tabs per tablet     100 tablet    Take 1 tablet by mouth daily    Pregnancy examination or test, pregnancy unconfirmed

## 2018-03-31 ASSESSMENT — PATIENT HEALTH QUESTIONNAIRE - PHQ9: SUM OF ALL RESPONSES TO PHQ QUESTIONS 1-9: 4

## 2018-03-31 ASSESSMENT — ANXIETY QUESTIONNAIRES: GAD7 TOTAL SCORE: 0

## 2018-04-02 ENCOUNTER — MYC MEDICAL ADVICE (OUTPATIENT)
Dept: FAMILY MEDICINE | Facility: OTHER | Age: 23
End: 2018-04-02

## 2018-04-02 DIAGNOSIS — K21.00 GASTROESOPHAGEAL REFLUX DISEASE WITH ESOPHAGITIS: Primary | ICD-10-CM

## 2018-04-03 NOTE — TELEPHONE ENCOUNTER
DJ: Please review and advise if there is a specific work up for pink tinged vomit that occurred twice within days of each other.       Responded via LearnUpon. Dominique Muller RN, BSN   Deanna Leahy is a 23 year old female who calls with pink color vomit during pregnancy.    NURSING ASSESSMENT:  Description:  Has been vomiting frequently during her pregnancy. First occurrence was a popsickle and second after a cookie a few days apart. Second occurrence occurred today. Very gaggy. Feels like she has air in her stomach. Sleeping with her mouth open at night. Burning in the esophagus that comes and goes. Denies vaginal concerns, chest pain, breathing changes, abdominal pain, fever.   Onset/duration:  2 episodes   Precip. factors:  Pregnant   Associated symptoms:  Vomiting   Improves/worsens symptoms:  Same   Pain scale (0-10)   2/10 burning   LMP/preg/breast feeding:  Patient's last menstrual period was 12/25/2017 (exact date).  GA: 14w0d  CATHY: Estimated Date of Delivery: Oct 1, 2018  Last exam/Treatment:  03/30/2018  Allergies:   Allergies   Allergen Reactions     No Known Drug Allergies      NURSING PLAN: Routed to provider Yes    RECOMMENDED DISPOSITION:  TBD  Will comply with recommendation: Yes  If further questions/concerns or if symptoms do not improve, worsen or new symptoms develop, call your PCP or [facility] as soon as possible.    NOTES:  Disposition was determined by the first positive assessment question, therefore all previous assessment questions were negative    Guideline used:  Telephone Triage for Obstetrics and Gynecology, Kait White and Mariya Seo  2nd trimester vomiting  Telephone Triage Protocols for Nurses, Fifth Edition, Giovana Burgos  Vomiting adult  Nursing judgment    Dominique Muller RN, BSN

## 2018-04-04 NOTE — TELEPHONE ENCOUNTER
Left message for patient to return call or mychart if having continued symptoms or any questions or concerns.    Jasper Smith, RN, BSN

## 2018-04-27 ENCOUNTER — PRENATAL OFFICE VISIT (OUTPATIENT)
Dept: FAMILY MEDICINE | Facility: OTHER | Age: 23
End: 2018-04-27
Payer: COMMERCIAL

## 2018-04-27 VITALS — DIASTOLIC BLOOD PRESSURE: 66 MMHG | SYSTOLIC BLOOD PRESSURE: 102 MMHG

## 2018-04-27 DIAGNOSIS — Z67.91 RH NEGATIVE STATE IN ANTEPARTUM PERIOD, UNSPECIFIED TRIMESTER: ICD-10-CM

## 2018-04-27 DIAGNOSIS — O26.899 RH NEGATIVE STATE IN ANTEPARTUM PERIOD, UNSPECIFIED TRIMESTER: ICD-10-CM

## 2018-04-27 DIAGNOSIS — Z34.80 ENCOUNTER FOR SUPERVISION OF OTHER NORMAL PREGNANCY, UNSPECIFIED TRIMESTER: Primary | ICD-10-CM

## 2018-04-27 PROCEDURE — 99207 ZZC PRENATAL VISIT: CPT | Performed by: FAMILY MEDICINE

## 2018-04-27 ASSESSMENT — PAIN SCALES - GENERAL: PAINLEVEL: NO PAIN (0)

## 2018-04-27 NOTE — PATIENT INSTRUCTIONS
Thank you for visiting [facility]    Keep up the good work!    Schedule your ultrasound around 20 weeks.    Contact us or return if questions or concerns.     Please Follow Up when indicated on the section below this one on your After-Visit Summary.      If you had imaging scheduled please refer to your radiology prep sheet.    Appointment    Date_______________     Time_____________    Day:       With____________________________    Location_________________________    If you need medication refills, please contact your pharmacy 3 days before your prescriptions runs out. If you are out of refills, your pharmacy will contact contact the clinic.    Contact us or return if questions or concerns.     -Your Care Team:  MD Sho Hooks PA-C Joel De Haan, PA-C Elizabeth McLean, APRN CNP    General information about your clinic      Clinic hours:     Lab hours:  Phone [phone]  Monday 7:30 am-7 pm    Monday 8:30 am-6:30 pm   7:30 am-5 pm    8:30 am-4:30 pm    Pharmacy hours:  Phone [phone]  Monday 8:30 am-7pm   8:30am-6 pm                                       Mychart assistance [phone]        We would like to hear from you, how was your visit today?    Dionne Phan  Patient Information Supervisor   Patient Care Supervisor  [facility], and [facility], and [facility]  [phone]       Number for OB ultrasound [phone]

## 2018-04-27 NOTE — PROGRESS NOTES
Nausea has improved.  Still gagging a bit.  Having some hip pain.  Only when sleeping.    Doing well.  No concerns today.  Prenatal flowsheet information is reviewed.  Discussed triple/quad screening.  She declines testing at this time.  She is Rh NEGATIVE.  Will require RhoGam.  Reportable signs and symptoms discussed.  F/u in 4 weeks.  Anatomic survey in  2.5 weeks.

## 2018-04-27 NOTE — NURSING NOTE
"Chief Complaint   Patient presents with     Prenatal Care     Panel Management     quad screen, 20 wk u/s       Initial /66 (BP Location: Left arm, Patient Position: Chair, Cuff Size: Adult Regular)  Pulse (P) 92  Temp (P) 97.9  F (36.6  C) (Temporal)  Resp (P) 16  Wt (P) 139 lb 1.6 oz (63.1 kg)  LMP 12/25/2017 (Exact Date)  BMI (P) 25.04 kg/m2 Estimated body mass index is 25.04 kg/(m^2) (pended) as calculated from the following:    Height as of 3/30/18: 5' 2.5\" (1.588 m).    Weight as of this encounter: (P) 139 lb 1.6 oz (63.1 kg).  Medication Reconciliation: complete  Nathanael Soto, CMA    "

## 2018-04-27 NOTE — MR AVS SNAPSHOT
After Visit Summary   4/27/2018    Deanna Leahy    MRN: [id number]           Patient Information     Date Of Birth          1995        Visit Information        Provider Department      4/27/2018 8:30 AM Romulo Basilio MD [facility]        Today's Diagnoses     Encounter for supervision of other normal pregnancy, unspecified trimester    -  1    Rh negative state in antepartum period, unspecified trimester          Care Instructions    Thank you for visiting [facility]    Keep up the good work!    Schedule your ultrasound around 20 weeks.    Contact us or return if questions or concerns.     Please Follow Up when indicated on the section below this one on your After-Visit Summary.      If you had imaging scheduled please refer to your radiology prep sheet.    Appointment    Date_______________     Time_____________    Day:   M TU W TH F    With____________________________    Location_________________________    If you need medication refills, please contact your pharmacy 3 days before your prescriptions runs out. If you are out of refills, your pharmacy will contact contact the clinic.    Contact us or return if questions or concerns.     -Your Care Team:  MD Sho Hooks PA-C Joel De Haan, PA-C Elizabeth McLean, APRN CNP    General information about your clinic      Clinic hours:     Lab hours:  Phone [phone]  Monday 7:30 am-7 pm    Monday 8:30 am-6:30 pm  Tuesday-Friday 7:30 am-5 pm   Tuesday-Friday 8:30 am-4:30 pm    Pharmacy hours:  Phone [phone]  Monday 8:30 am-7pm  Tuesday-Friday 8:30am-6 pm                                       Mychart assistance [phone]        We would like to hear from you, how was your visit today?    Dionne Phan  Patient Information Supervisor   Patient Care Supervisor  Shannan Dawkins and Cyrus [facility] Shannan Dawkins, and Cyrus  [facility]  [phone]       Number for OB ultrasound [phone]          Follow-ups after your visit        Follow-up notes from your care team     Return in about 4 weeks (around 2018) for Recheck.      Future tests that were ordered for you today     Open Future Orders        Priority Expected Expires Ordered    US OB > 14 Weeks Complete Single Routine  2019            Who to contact     If you have questions or need follow up information about today's clinic visit or your schedule please contact [facility] directly at [phone].  Normal or non-critical lab and imaging results will be communicated to you by MyChart, letter or phone within 4 business days after the clinic has received the results. If you do not hear from us within 7 days, please contact the clinic through ShowNearbyhart or phone. If you have a critical or abnormal lab result, we will notify you by phone as soon as possible.  Submit refill requests through Agendia or call your pharmacy and they will forward the refill request to us. Please allow 3 business days for your refill to be completed.          Additional Information About Your Visit        MyChart Information     Agendia gives you secure access to your electronic health record. If you see a primary care provider, you can also send messages to your care team and make appointments. If you have questions, please call your primary care clinic.  If you do not have a primary care provider, please call [phone] and they will assist you.        Care EveryWhere ID     This is your Care EveryWhere ID. This could be used by other organizations to access your [place] medical records  [id number]        Your Vitals Were     Last Period                   2017 (Exact Date)            Blood Pressure from Last 3 Encounters:   18 102/66   18 100/64   18 108/74    Weight from Last 3 Encounters:   18 (P) 139  lb 1.6 oz (63.1 kg)   03/30/18 134 lb 1.6 oz (60.8 kg)   03/02/18 129 lb (58.5 kg)               Primary Care Provider Office Phone # Fax #    Sho De La Torre PA-C [phone] [address] [place] [address]        Equal Access to Services     [facility]: Hadii aad ku hadasho Soomaali, waaxda luqadaha, qaybta kaalmada adeegyada, waxay idiin hayaan adeeg kharash la'aan ah. So [facility] [phone].    ATENCIÓN: Si habla español, tiene a simons disposición servicios gratuitos de asistencia lingüística. Llame al [phone].    We comply with applicable federal civil rights laws and [place] laws. We do not discriminate on the basis of race, color, national origin, age, disability, sex, sexual orientation, or gender identity.            Thank you!     Thank you for choosing [facility]  for your care. Our goal is always to provide you with excellent care. Hearing back from our patients is one way we can continue to improve our services. Please take a few minutes to complete the written survey that you may receive in the mail after your visit with us. Thank you!             Your Updated Medication List - Protect others around you: Learn how to safely use, store and throw away your medicines at www.disposemymeds.org.          This list is accurate as of 4/27/18  8:39 AM.  Always use your most recent med list.                   Brand Name Dispense Instructions for use Diagnosis    prenatal multivitamin plus iron 27-0.8 MG Tabs per tablet     100 tablet    Take 1 tablet by mouth daily    Pregnancy examination or test, pregnancy unconfirmed       ranitidine 150 MG tablet    ZANTAC    60 tablet    Take 1 tablet (150 mg) by mouth 2 times daily    Gastroesophageal reflux disease with esophagitis

## 2018-05-14 ENCOUNTER — RADIANT APPOINTMENT (OUTPATIENT)
Dept: ULTRASOUND IMAGING | Facility: CLINIC | Age: 23
End: 2018-05-14
Attending: FAMILY MEDICINE
Payer: COMMERCIAL

## 2018-05-14 ENCOUNTER — TELEPHONE (OUTPATIENT)
Dept: FAMILY MEDICINE | Facility: OTHER | Age: 23
End: 2018-05-14

## 2018-05-14 DIAGNOSIS — O26.899 RH NEGATIVE STATE IN ANTEPARTUM PERIOD, UNSPECIFIED TRIMESTER: ICD-10-CM

## 2018-05-14 DIAGNOSIS — Z67.91 RH NEGATIVE STATE IN ANTEPARTUM PERIOD, UNSPECIFIED TRIMESTER: ICD-10-CM

## 2018-05-14 DIAGNOSIS — Z34.80 ENCOUNTER FOR SUPERVISION OF OTHER NORMAL PREGNANCY, UNSPECIFIED TRIMESTER: ICD-10-CM

## 2018-05-14 PROCEDURE — 76805 OB US >/= 14 WKS SNGL FETUS: CPT

## 2018-05-14 NOTE — TELEPHONE ENCOUNTER
Reason for Call:  Form, our goal is to have forms completed with 72 hours, however, some forms may require a visit or additional information.    Type of letter, form or note:  Short Term Disability    Who is the form from?: [employer]  (if other please explain)    Where did the form come from: Patient or family brought in       What clinic location was the form placed at?: [facility] - [phone]    Where the form was placed: 's Box    What number is listed as a contact on the form?: [phone]       Additional comments: fax when done to :[phone] and mail back originals to patient.     Call taken on 5/14/2018 at 3:10 PM by Caroline Aparicio

## 2018-05-24 ENCOUNTER — PRENATAL OFFICE VISIT (OUTPATIENT)
Dept: FAMILY MEDICINE | Facility: OTHER | Age: 23
End: 2018-05-24
Payer: COMMERCIAL

## 2018-05-24 VITALS
TEMPERATURE: 97.8 F | BODY MASS INDEX: 25.99 KG/M2 | DIASTOLIC BLOOD PRESSURE: 64 MMHG | RESPIRATION RATE: 16 BRPM | HEIGHT: 63 IN | SYSTOLIC BLOOD PRESSURE: 102 MMHG | WEIGHT: 146.7 LBS | HEART RATE: 80 BPM

## 2018-05-24 DIAGNOSIS — E73.8 ACQUIRED LACTOSE INTOLERANCE: ICD-10-CM

## 2018-05-24 DIAGNOSIS — Z67.91 RH NEGATIVE STATE IN ANTEPARTUM PERIOD, UNSPECIFIED TRIMESTER: ICD-10-CM

## 2018-05-24 DIAGNOSIS — O26.899 RH NEGATIVE STATE IN ANTEPARTUM PERIOD, UNSPECIFIED TRIMESTER: ICD-10-CM

## 2018-05-24 DIAGNOSIS — Z34.80 ENCOUNTER FOR SUPERVISION OF OTHER NORMAL PREGNANCY, UNSPECIFIED TRIMESTER: Primary | ICD-10-CM

## 2018-05-24 PROCEDURE — 99207 ZZC PRENATAL VISIT: CPT | Performed by: FAMILY MEDICINE

## 2018-05-24 ASSESSMENT — PAIN SCALES - GENERAL: PAINLEVEL: NO PAIN (0)

## 2018-05-24 NOTE — MR AVS SNAPSHOT
After Visit Summary   5/24/2018    Deanna Leahy    MRN: [id number]           Patient Information     Date Of Birth          1995        Visit Information        Provider Department      5/24/2018 9:45 AM Romulo Basilio MD [facility]        Care Instructions    Thank you for visiting [facility]    Keep up the good work!    Contact us or return if questions or concerns.     Good luck with the lactose issue    Please Follow Up when indicated on the section below this one on your After-Visit Summary.      If you had imaging scheduled please refer to your radiology prep sheet.    Appointment    Date_______________     Time_____________    Day:   M TU W TH F    With____________________________    Location_________________________    If you need medication refills, please contact your pharmacy 3 days before your prescriptions runs out. If you are out of refills, your pharmacy will contact contact the clinic.    Contact us or return if questions or concerns.     -Your Care Team:  MD Sho Hooks PA-C Joel De Haan, PA-C Elizabeth McLean, APRN CNP    General information about your clinic      Clinic hours:     Lab hours:  Phone [phone]  Monday 7:30 am-7 pm    Monday 8:30 am-6:30 pm  Tuesday-Friday 7:30 am-5 pm   Tuesday-Friday 8:30 am-4:30 pm    Pharmacy hours:  Phone [phone]  Monday 8:30 am-7pm  Tuesday-Friday 8:30am-6 pm                                       Mychart assistance [phone]        We would like to hear from you, how was your visit today?    Dionne Phan  Patient Information Supervisor   Patient Care Supervisor  [facility], and [facility], and [facility]  [phone]             Follow-ups after your visit        Follow-up notes from your care team     Return in about 4 weeks (around 6/21/2018) for Recheck.      Who to  "contact     If you have questions or need follow up information about today's clinic visit or your schedule please contact [facility] [place] directly at [phone].  Normal or non-critical lab and imaging results will be communicated to you by [employer]ou[employer], letter or phone within 4 business days after the clinic has received the results. If you do not hear from us within 7 days, please contact the clinic through [employer] or phone. If you have a critical or abnormal lab result, we will notify you by phone as soon as possible.  Submit refill requests through AmpIdea or call your pharmacy and they will forward the refill request to us. Please allow 3 business days for your refill to be completed.          Additional Information About Your Visit        AmpIdea Information     AmpIdea gives you secure access to your electronic health record. If you see a primary care provider, you can also send messages to your care team and make appointments. If you have questions, please call your primary care clinic.  If you do not have a primary care provider, please call [phone] and they will assist you.        Care EveryWhere ID     This is your Care EveryWhere ID. This could be used by other organizations to access your [place] medical records  [id number]        Your Vitals Were     Pulse Temperature Respirations Height Last Period BMI (Body Mass Index)    80 97.8  F (36.6  C) (Temporal) 16 5' 2.5\" (1.588 m) 12/25/2017 (Exact Date) 26.4 kg/m2       Blood Pressure from Last 3 Encounters:   05/24/18 102/64   04/27/18 102/66   03/30/18 100/64    Weight from Last 3 Encounters:   05/24/18 146 lb 11.2 oz (66.5 kg)   04/27/18 (P) 139 lb 1.6 oz (63.1 kg)   03/30/18 134 lb 1.6 oz (60.8 kg)              Today, you had the following     No orders found for display       Primary Care Provider Office Phone # Fax #    Sho De La Torre PA-C [phone] [address] [place] [address]        Equal Access to " Services     [facility]: Hadii kam Wood, watioda luqadaha, qaybta kaalmajovi melara, jasmyn manjarrez. So [facility] [phone].    ATENCIÓN: Si habla español, tiene a simons disposición servicios gratuitos de asistencia lingüística. Llame al [phone].    We comply with applicable federal civil rights laws and [place] laws. We do not discriminate on the basis of race, color, national origin, age, disability, sex, sexual orientation, or gender identity.            Thank you!     Thank you for choosing [facility]  for your care. Our goal is always to provide you with excellent care. Hearing back from our patients is one way we can continue to improve our services. Please take a few minutes to complete the written survey that you may receive in the mail after your visit with us. Thank you!             Your Updated Medication List - Protect others around you: Learn how to safely use, store and throw away your medicines at www.disposemymeds.org.          This list is accurate as of 5/24/18  9:53 AM.  Always use your most recent med list.                   Brand Name Dispense Instructions for use Diagnosis    prenatal multivitamin plus iron 27-0.8 MG Tabs per tablet     100 tablet    Take 1 tablet by mouth daily    Pregnancy examination or test, pregnancy unconfirmed       ranitidine 150 MG tablet    ZANTAC    60 tablet    Take 1 tablet (150 mg) by mouth 2 times daily    Gastroesophageal reflux disease with esophagitis

## 2018-05-24 NOTE — PATIENT INSTRUCTIONS
Thank you for visiting [facility]    Keep up the good work!    Contact us or return if questions or concerns.     Good luck with the lactose issue    Please Follow Up when indicated on the section below this one on your After-Visit Summary.      If you had imaging scheduled please refer to your radiology prep sheet.    Appointment    Date_______________     Time_____________    Day:   M TU W TH F    With____________________________    Location_________________________    If you need medication refills, please contact your pharmacy 3 days before your prescriptions runs out. If you are out of refills, your pharmacy will contact contact the clinic.    Contact us or return if questions or concerns.     -Your Care Team:  MD Sho Hooks PA-C Joel De Haan, PA-C Elizabeth McLean, APRN CNP    General information about your clinic      Clinic hours:     Lab hours:  Phone [phone]  Monday 7:30 am-7 pm    Monday 8:30 am-6:30 pm  Tuesday-Friday 7:30 am-5 pm   Tuesday-Friday 8:30 am-4:30 pm    Pharmacy hours:  Phone [phone]  Monday 8:30 am-7pm  Tuesday-Friday 8:30am-6 pm                                       Mychart assistance [phone]        We would like to hear from you, how was your visit today?    Dionne Phan  Patient Information Supervisor   Patient Care Supervisor  [facility] Shannan [place], and [facility], and [facility]  [phone]

## 2018-05-24 NOTE — PROGRESS NOTES
Will be moving to [place] this summer.  Having lactose intolerance since 2nd trimester started.  Prenatal flowsheet information is reviewed.  Reportable signs and symptoms discussed.  F/u in 4 weeks.

## 2018-06-09 ENCOUNTER — MYC MEDICAL ADVICE (OUTPATIENT)
Dept: FAMILY MEDICINE | Facility: OTHER | Age: 23
End: 2018-06-09

## 2018-06-11 NOTE — TELEPHONE ENCOUNTER
Sho, will you review and sign this for her bhaskar Dr Basilio is out?    Sakina Flower, RAMONA ([facility])

## 2018-06-18 ASSESSMENT — ANXIETY QUESTIONNAIRES
1. FEELING NERVOUS, ANXIOUS, OR ON EDGE: NOT AT ALL
7. FEELING AFRAID AS IF SOMETHING AWFUL MIGHT HAPPEN: NOT AT ALL
GAD7 TOTAL SCORE: 0
3. WORRYING TOO MUCH ABOUT DIFFERENT THINGS: NOT AT ALL
6. BECOMING EASILY ANNOYED OR IRRITABLE: NOT AT ALL
4. TROUBLE RELAXING: NOT AT ALL
5. BEING SO RESTLESS THAT IT IS HARD TO SIT STILL: NOT AT ALL
7. FEELING AFRAID AS IF SOMETHING AWFUL MIGHT HAPPEN: NOT AT ALL
GAD7 TOTAL SCORE: 0
GAD7 TOTAL SCORE: 0
2. NOT BEING ABLE TO STOP OR CONTROL WORRYING: NOT AT ALL

## 2018-06-18 ASSESSMENT — PATIENT HEALTH QUESTIONNAIRE - PHQ9
SUM OF ALL RESPONSES TO PHQ QUESTIONS 1-9: 0
10. IF YOU CHECKED OFF ANY PROBLEMS, HOW DIFFICULT HAVE THESE PROBLEMS MADE IT FOR YOU TO DO YOUR WORK, TAKE CARE OF THINGS AT HOME, OR GET ALONG WITH OTHER PEOPLE: NOT DIFFICULT AT ALL
SUM OF ALL RESPONSES TO PHQ QUESTIONS 1-9: 0

## 2018-06-19 ASSESSMENT — PATIENT HEALTH QUESTIONNAIRE - PHQ9: SUM OF ALL RESPONSES TO PHQ QUESTIONS 1-9: 0

## 2018-06-19 ASSESSMENT — ANXIETY QUESTIONNAIRES: GAD7 TOTAL SCORE: 0

## 2018-06-21 ENCOUNTER — PRENATAL OFFICE VISIT (OUTPATIENT)
Dept: FAMILY MEDICINE | Facility: OTHER | Age: 23
End: 2018-06-21
Payer: COMMERCIAL

## 2018-06-21 VITALS
BODY MASS INDEX: 27.04 KG/M2 | RESPIRATION RATE: 16 BRPM | SYSTOLIC BLOOD PRESSURE: 104 MMHG | HEART RATE: 100 BPM | DIASTOLIC BLOOD PRESSURE: 70 MMHG | TEMPERATURE: 97.7 F | WEIGHT: 152.6 LBS | HEIGHT: 63 IN

## 2018-06-21 DIAGNOSIS — R73.9 HYPERGLYCEMIA: ICD-10-CM

## 2018-06-21 DIAGNOSIS — Z67.91 RH NEGATIVE STATE IN ANTEPARTUM PERIOD, UNSPECIFIED TRIMESTER: ICD-10-CM

## 2018-06-21 DIAGNOSIS — Z34.80 ENCOUNTER FOR SUPERVISION OF OTHER NORMAL PREGNANCY, UNSPECIFIED TRIMESTER: Primary | ICD-10-CM

## 2018-06-21 DIAGNOSIS — O26.899 RH NEGATIVE STATE IN ANTEPARTUM PERIOD, UNSPECIFIED TRIMESTER: ICD-10-CM

## 2018-06-21 LAB
GLUCOSE 1H P 50 G GLC PO SERPL-MCNC: 138 MG/DL (ref 60–129)
HGB BLD-MCNC: 11.7 G/DL (ref 11.7–15.7)

## 2018-06-21 PROCEDURE — 36415 COLL VENOUS BLD VENIPUNCTURE: CPT | Performed by: FAMILY MEDICINE

## 2018-06-21 PROCEDURE — 82950 GLUCOSE TEST: CPT | Performed by: FAMILY MEDICINE

## 2018-06-21 PROCEDURE — 99207 ZZC PRENATAL VISIT: CPT | Performed by: FAMILY MEDICINE

## 2018-06-21 PROCEDURE — 86780 TREPONEMA PALLIDUM: CPT | Performed by: FAMILY MEDICINE

## 2018-06-21 PROCEDURE — 00000218 ZZHCL STATISTIC OBHBG - HEMOGLOBIN: Performed by: FAMILY MEDICINE

## 2018-06-21 ASSESSMENT — PAIN SCALES - GENERAL: PAINLEVEL: NO PAIN (0)

## 2018-06-21 NOTE — PATIENT INSTRUCTIONS
Thank you for visiting [facility]    Keep up the good work!    We'll let you know your lab results as soon as we can.     Please return for Rhogam at 28 weeks, sooner if bleeding or abdominal trauma.    Please Follow Up when indicated on the section below this one on your After-Visit Summary.      If you had imaging scheduled please refer to your radiology prep sheet.    Appointment    Date_______________     Time_____________    Day:   M TU W TH F    With____________________________    Location_________________________    If you need medication refills, please contact your pharmacy 3 days before your prescriptions runs out. If you are out of refills, your pharmacy will contact contact the clinic.    Contact us or return if questions or concerns.     -Your Care Team:  MD Sho Hooks PA-C Joel De Haan, PA-C Elizabeth McLean, APRN CNP    General information about your clinic      Clinic hours:     Lab hours:  Phone [phone]  Monday 7:30 am-7 pm    Monday 8:30 am-6:30 pm  Tuesday-Friday 7:30 am-5 pm   Tuesday-Friday 8:30 am-4:30 pm    Pharmacy hours:  Phone [phone]  Monday 8:30 am-7pm  Tuesday-Friday 8:30am-6 pm                                       Mychart assistance [phone]        We would like to hear from you, how was your visit today?    Dionne Phan  Patient Information Supervisor   Patient Care Supervisor  [facility], and [facility], and [facility]  [phone]

## 2018-06-21 NOTE — MR AVS SNAPSHOT
After Visit Summary   6/21/2018    Deanna Leahy    MRN: [id number]           Patient Information     Date Of Birth          1995        Visit Information        Provider Department      6/21/2018 9:15 AM Romulo Basilio MD [facility]        Today's Diagnoses     Encounter for supervision of other normal pregnancy, unspecified trimester    -  1    Rh negative state in antepartum period, unspecified trimester          Care Instructions    Thank you for visiting [facility]    Keep up the good work!    We'll let you know your lab results as soon as we can.     Please return for Rhogam at 28 weeks, sooner if bleeding or abdominal trauma.    Please Follow Up when indicated on the section below this one on your After-Visit Summary.      If you had imaging scheduled please refer to your radiology prep sheet.    Appointment    Date_______________     Time_____________    Day:   M TU W TH F    With____________________________    Location_________________________    If you need medication refills, please contact your pharmacy 3 days before your prescriptions runs out. If you are out of refills, your pharmacy will contact contact the clinic.    Contact us or return if questions or concerns.     -Your Care Team:  MD Sho Hooks PA-C Joel De Haan, PA-C Elizabeth McLean, APRN CNP    General information about your clinic      Clinic hours:     Lab hours:  Phone [phone]  Monday 7:30 am-7 pm    Monday 8:30 am-6:30 pm  Tuesday-Friday 7:30 am-5 pm   Tuesday-Friday 8:30 am-4:30 pm    Pharmacy hours:  Phone [phone]  Monday 8:30 am-7pm  Tuesday-Friday 8:30am-6 pm                                       Mychart assistance [phone]        We would like to hear from you, how was your visit today?    Dionne Phan  Patient Information Supervisor   Patient Care Supervisor  Juancho, Shannan Prado, harriet Bridges  "[facility] [place], [place], and [facility]  [phone]             Follow-ups after your visit        Follow-up notes from your care team     Return in about 4 weeks (around 7/19/2018) for Recheck.      Who to contact     If you have questions or need follow up information about today's clinic visit or your schedule please contact [facility] directly at [phone].  Normal or non-critical lab and imaging results will be communicated to you by Evoleenhart, letter or phone within 4 business days after the clinic has received the results. If you do not hear from us within 7 days, please contact the clinic through Acertivt or phone. If you have a critical or abnormal lab result, we will notify you by phone as soon as possible.  Submit refill requests through Stealz or call your pharmacy and they will forward the refill request to us. Please allow 3 business days for your refill to be completed.          Additional Information About Your Visit        EvoleenharQwenty Information     Stealz gives you secure access to your electronic health record. If you see a primary care provider, you can also send messages to your care team and make appointments. If you have questions, please call your primary care clinic.  If you do not have a primary care provider, please call [phone] and they will assist you.        Care EveryWhere ID     This is your Care EveryWhere ID. This could be used by other organizations to access your [place] medical records  [id number]        Your Vitals Were     Pulse Temperature Respirations Height Last Period BMI (Body Mass Index)    100 97.7  F (36.5  C) (Temporal) 16 5' 2.5\" (1.588 m) 12/25/2017 (Exact Date) 27.47 kg/m2       Blood Pressure from Last 3 Encounters:   06/21/18 104/70   05/24/18 102/64   04/27/18 102/66    Weight from Last 3 Encounters:   06/21/18 152 lb 9.6 oz (69.2 kg)   05/24/18 146 lb 11.2 oz (66.5 kg)   04/27/18 (P) 139 lb 1.6 oz (63.1 kg) "              We Performed the Following     Glucose Challenge Test (GCT) 1 Hour     OB Hemoglobin     Treponema Abs w Reflex to RPR and Titer        Primary Care Provider Office Phone # Fax #    Sho De La Torre PA-C [phone] [address] [place] [address]        Equal Access to Services     [facility]: Hadii aad ku hadasho Soomaali, waaxda luqadaha, qaybta kaalmada adeegyada, waxay idiin hayaan adeeg khchichi la'cotyn . So [facility] [phone].    ATENCIÓN: Si habla español, tiene a simons disposición servicios gratuitos de asistencia lingüística. Llame al [phone].    We comply with applicable federal civil rights laws and [place] laws. We do not discriminate on the basis of race, color, national origin, age, disability, sex, sexual orientation, or gender identity.            Thank you!     Thank you for choosing [facility] [place]  for your care. Our goal is always to provide you with excellent care. Hearing back from our patients is one way we can continue to improve our services. Please take a few minutes to complete the written survey that you may receive in the mail after your visit with us. Thank you!             Your Updated Medication List - Protect others around you: Learn how to safely use, store and throw away your medicines at www.disposemymeds.org.          This list is accurate as of 6/21/18  9:32 AM.  Always use your most recent med list.                   Brand Name Dispense Instructions for use Diagnosis    order for DME     1 Device    Equipment being ordered:Breast pump    Breast feeding status of mother       prenatal multivitamin plus iron 27-0.8 MG Tabs per tablet     100 tablet    Take 1 tablet by mouth daily    Pregnancy examination or test, pregnancy unconfirmed

## 2018-06-22 LAB — T PALLIDUM AB SER QL: NONREACTIVE

## 2018-06-27 DIAGNOSIS — O26.899 RH NEGATIVE STATE IN ANTEPARTUM PERIOD, UNSPECIFIED TRIMESTER: ICD-10-CM

## 2018-06-27 DIAGNOSIS — Z34.80 ENCOUNTER FOR SUPERVISION OF OTHER NORMAL PREGNANCY, UNSPECIFIED TRIMESTER: ICD-10-CM

## 2018-06-27 DIAGNOSIS — R73.9 HYPERGLYCEMIA: ICD-10-CM

## 2018-06-27 DIAGNOSIS — Z67.91 RH NEGATIVE STATE IN ANTEPARTUM PERIOD, UNSPECIFIED TRIMESTER: ICD-10-CM

## 2018-06-27 LAB
GLUCOSE 1H P 100 G GLC PO SERPL-MCNC: 131 MG/DL (ref 60–179)
GLUCOSE 2H P 100 G GLC PO SERPL-MCNC: 107 MG/DL (ref 60–154)
GLUCOSE 3H P 100 G GLC PO SERPL-MCNC: 99 MG/DL (ref 60–139)
GLUCOSE P FAST SERPL-MCNC: 69 MG/DL (ref 60–94)

## 2018-06-27 PROCEDURE — 82952 GTT-ADDED SAMPLES: CPT | Performed by: FAMILY MEDICINE

## 2018-06-27 PROCEDURE — 82951 GLUCOSE TOLERANCE TEST (GTT): CPT | Performed by: FAMILY MEDICINE

## 2018-06-27 PROCEDURE — 36415 COLL VENOUS BLD VENIPUNCTURE: CPT | Performed by: FAMILY MEDICINE

## 2018-06-27 NOTE — PROGRESS NOTES
Deanna,    All of your labs were normal for you.  No gestational diabetes.    Have a nice day!    Dr. Basilio

## 2018-07-09 ENCOUNTER — ALLIED HEALTH/NURSE VISIT (OUTPATIENT)
Dept: FAMILY MEDICINE | Facility: OTHER | Age: 23
End: 2018-07-09
Payer: COMMERCIAL

## 2018-07-09 DIAGNOSIS — Z67.90 BLOOD TYPE, RH POSITIVE: Primary | ICD-10-CM

## 2018-07-09 PROCEDURE — 96372 THER/PROPH/DIAG INJ SC/IM: CPT

## 2018-07-09 PROCEDURE — 99207 ZZC NO CHARGE NURSE ONLY: CPT

## 2018-07-09 NOTE — NURSING NOTE
Chief Complaint   Patient presents with     Imm/Inj     rhogam     Prior to injection verified patient identity using patient's name and date of birth.  Due to injection administration, patient instructed to remain in clinic for 15 minutes  afterwards, and to report any adverse reaction to me immediately.    The following medication was given:     MEDICATION: RhoGam 300 ug  ROUTE: IM  SITE: Ventrogluteal - Left  DOSE: 300ug  LOT #: [id number]  :  [employer]  EXPIRATION DATE:  10/6/2019  NDC#: 5600-9814-38    Sakina Flower CMA ([facility])

## 2018-07-09 NOTE — MR AVS SNAPSHOT
After Visit Summary   7/9/2018    Deanna Leahy    MRN: [id number]           Patient Information     Date Of Birth          1995        Visit Information        Provider Department      7/9/2018 9:00 AM NL FLOAT NURSE [facility]        Today's Diagnoses     Blood type, Rh positive    -  1       Follow-ups after your visit        Your next 10 appointments already scheduled     Jul 20, 2018 10:15 AM CDT   [facility]    [address] [address] [phone]              Who to contact     If you have questions or need follow up information about today's clinic visit or your schedule please contact [facility] directly at [phone].  Normal or non-critical lab and imaging results will be communicated to you by MyChart, letter or phone within 4 business days after the clinic has received the results. If you do not hear from us within 7 days, please contact the clinic through MyChart or phone. If you have a critical or abnormal lab result, we will notify you by phone as soon as possible.  Submit refill requests through [employer] or call your pharmacy and they will forward the refill request to us. Please allow 3 business days for your refill to be completed.          Additional Information About Your Visit        MyChart Information     [employer] gives you secure access to your electronic health record. If you see a primary care provider, you can also send messages to your care team and make appointments. If you have questions, please call your primary care clinic.  If you do not have a primary care provider, please call [phone] and they will assist you.        Care EveryWhere ID     This is your Care EveryWhere ID. This could be used by other organizations to access your [place] medical records  [id number]        Your Vitals Were      Last Period                   12/25/2017 (Exact Date)            Blood Pressure from Last 3 Encounters:   06/21/18 104/70   05/24/18 102/64   04/27/18 102/66    Weight from Last 3 Encounters:   06/21/18 152 lb 9.6 oz (69.2 kg)   05/24/18 146 lb 11.2 oz (66.5 kg)   04/27/18 (P) 139 lb 1.6 oz (63.1 kg)              We Performed the Following     INJECTION INTRAMUSCULAR OR SUB-Q     RHO D IMMUNE GLOBULIN, FULL DOSE 300 MCG        Primary Care Provider Office Phone # Fax #    Sho De La Torre PA-C [phone] [address] [place] [address]        Equal Access to Services     MILY BELTRAN : Hadii kam longo Nina, waaxda luqadaha, qaybta kaalmada saritha, jasmyn leahy . So [facility] [phone].    ATENCIÓN: Si habla español, tiene a simons disposición servicios gratuitos de asistencia lingüística. [facility] [phone].    We comply with applicable federal civil rights laws and [place] laws. We do not discriminate on the basis of race, color, national origin, age, disability, sex, sexual orientation, or gender identity.            Thank you!     Thank you for choosing [facility]  for your care. Our goal is always to provide you with excellent care. Hearing back from our patients is one way we can continue to improve our services. Please take a few minutes to complete the written survey that you may receive in the mail after your visit with us. Thank you!             Your Updated Medication List - Protect others around you: Learn how to safely use, store and throw away your medicines at www.disposemymeds.org.          This list is accurate as of 7/9/18  9:15 AM.  Always use your most recent med list.                   Brand Name Dispense Instructions for use Diagnosis    order for DME     1 Device    Equipment being ordered:Breast pump    Breast feeding status of mother       prenatal multivitamin plus iron 27-0.8 MG Tabs per tablet     100 tablet     Take 1 tablet by mouth daily    Pregnancy examination or test, pregnancy unconfirmed

## 2018-07-20 ENCOUNTER — PRENATAL OFFICE VISIT (OUTPATIENT)
Dept: FAMILY MEDICINE | Facility: OTHER | Age: 23
End: 2018-07-20
Payer: COMMERCIAL

## 2018-07-20 VITALS
HEART RATE: 80 BPM | BODY MASS INDEX: 27.54 KG/M2 | WEIGHT: 153 LBS | SYSTOLIC BLOOD PRESSURE: 100 MMHG | RESPIRATION RATE: 16 BRPM | TEMPERATURE: 97.3 F | DIASTOLIC BLOOD PRESSURE: 70 MMHG

## 2018-07-20 DIAGNOSIS — Z34.80 ENCOUNTER FOR SUPERVISION OF OTHER NORMAL PREGNANCY, UNSPECIFIED TRIMESTER: Primary | ICD-10-CM

## 2018-07-20 DIAGNOSIS — O26.899 RH NEGATIVE STATE IN ANTEPARTUM PERIOD, UNSPECIFIED TRIMESTER: ICD-10-CM

## 2018-07-20 DIAGNOSIS — Z67.91 RH NEGATIVE STATE IN ANTEPARTUM PERIOD, UNSPECIFIED TRIMESTER: ICD-10-CM

## 2018-07-20 PROCEDURE — 99207 ZZC PRENATAL VISIT: CPT | Performed by: FAMILY MEDICINE

## 2018-07-20 ASSESSMENT — PAIN SCALES - GENERAL: PAINLEVEL: NO PAIN (0)

## 2018-07-20 NOTE — MR AVS SNAPSHOT
After Visit Summary   7/20/2018    Deanna Leahy    MRN: [id number]           Patient Information     Date Of Birth          1995        Visit Information        Provider Department      7/20/2018 10:15 AM Romulo Basilio MD [facility]        Care Instructions    Thank you for visiting [facility]    Keep up the good work!    Contact us or return if questions or concerns.     Please Follow Up when indicated on the section below this one on your After-Visit Summary.      If you had imaging scheduled please refer to your radiology prep sheet.    Appointment    Date_______________     Time_____________    Day:   M TU W TH F    With____________________________    Location_________________________    If you need medication refills, please contact your pharmacy 3 days before your prescriptions runs out. If you are out of refills, your pharmacy will contact contact the clinic.    Contact us or return if questions or concerns.     -Your Care Team:  MD Sho Hooks PA-C Joel De Haan, PA-C Elizabeth McLean, APRN CNP    General information about your clinic      Clinic hours:     Lab hours:  Phone [phone]  Monday 7:30 am-7 pm    Monday 8:30 am-6:30 pm  Tuesday-Friday 7:30 am-5 pm   Tuesday-Friday 8:30 am-4:30 pm    Pharmacy hours:  Phone [phone]  Monday 8:30 am-7pm  Tuesday-Friday 8:30am-6 pm                                       Mychart assistance [phone]        We would like to hear from you, how was your visit today?    Dionne Phan  Patient Information Supervisor   Patient Care Supervisor  [facility], and \[facility]", and [facility]  [phone]             Follow-ups after your visit        Follow-up notes from your care team     Return in about 2 weeks (around 8/3/2018) for Recheck.      Who to contact     If you have questions or  need follow up information about today's clinic visit or your schedule please contact [facility] [place] directly at [phone].  Normal or non-critical lab and imaging results will be communicated to you by [employer], letter or phone within 4 business days after the clinic has received the results. If you do not hear from us within 7 days, please contact the clinic through [employer] or phone. If you have a critical or abnormal lab result, we will notify you by phone as soon as possible.  Submit refill requests through [employer] or call your pharmacy and they will forward the refill request to us. Please allow 3 business days for your refill to be completed.          Additional Information About Your Visit        [employer] Information     [employer] gives you secure access to your electronic health record. If you see a primary care provider, you can also send messages to your care team and make appointments. If you have questions, please call your primary care clinic.  If you do not have a primary care provider, please call [phone] and they will assist you.        Care EveryWhere ID     This is your Care EveryWhere ID. This could be used by other organizations to access your [place] medical records  [id number]        Your Vitals Were     Pulse Temperature Respirations Last Period BMI (Body Mass Index)       80 97.3  F (36.3  C) (Temporal) 16 12/25/2017 (Exact Date) 27.54 kg/m2        Blood Pressure from Last 3 Encounters:   07/20/18 100/70   06/21/18 104/70   05/24/18 102/64    Weight from Last 3 Encounters:   07/20/18 153 lb (69.4 kg)   06/21/18 152 lb 9.6 oz (69.2 kg)   05/24/18 146 lb 11.2 oz (66.5 kg)              Today, you had the following     No orders found for display       Primary Care Provider Office Phone # Fax #    Sho De La Torre PA-C [phone] [address] [place] [address]        Equal Access to Services     CARLOS BELTRAN : hollie Rios  halle banueloscaseyjovi gasparalbertina waxselena elliottmoustapha manjarrez. So [facility] [phone].    ATENCIÓN: Si belkis thomas, tiene a simons disposición servicios gratuitos de asistencia lingüística. Llame al [phone].    We comply with applicable federal civil rights laws and [place] laws. We do not discriminate on the basis of race, color, national origin, age, disability, sex, sexual orientation, or gender identity.            Thank you!     Thank you for choosing [facility]  for your care. Our goal is always to provide you with excellent care. Hearing back from our patients is one way we can continue to improve our services. Please take a few minutes to complete the written survey that you may receive in the mail after your visit with us. Thank you!             Your Updated Medication List - Protect others around you: Learn how to safely use, store and throw away your medicines at www.disposemymeds.org.          This list is accurate as of 7/20/18 10:25 AM.  Always use your most recent med list.                   Brand Name Dispense Instructions for use Diagnosis    order for DME     1 Device    Equipment being ordered:Breast pump    Breast feeding status of mother       prenatal multivitamin plus iron 27-0.8 MG Tabs per tablet     100 tablet    Take 1 tablet by mouth daily    Pregnancy examination or test, pregnancy unconfirmed

## 2018-07-20 NOTE — PROGRESS NOTES
No bleeding, no LOF, not ctx.  Some mild round ligament pain.  Doing well.  No concerns today.  Cephalic position confirmed by Leopold maneuvers.  Prenatal flowsheet information is reviewed.  Interested in copper IUD after delivery.  Consider if she would like to receive Tdap.  Discussed PTL, PROM, and when to call or come in.  Reportable signs and symptoms discussed.  F/u in 2 weeks.

## 2018-07-20 NOTE — PATIENT INSTRUCTIONS
Thank you for visiting [facility]    Keep up the good work!    Contact us or return if questions or concerns.     Please Follow Up when indicated on the section below this one on your After-Visit Summary.      If you had imaging scheduled please refer to your radiology prep sheet.    Appointment    Date_______________     Time_____________    Day:   M TU W TH F    With____________________________    Location_________________________    If you need medication refills, please contact your pharmacy 3 days before your prescriptions runs out. If you are out of refills, your pharmacy will contact contact the clinic.    Contact us or return if questions or concerns.     -Your Care Team:  MD Sho Hooks PA-C Joel De Haan, PA-C Elizabeth McLean, APRN CNP    General information about your clinic      Clinic hours:     Lab hours:  Phone [phone]  Monday 7:30 am-7 pm    Monday 8:30 am-6:30 pm  Tuesday-Friday 7:30 am-5 pm   Tuesday-Friday 8:30 am-4:30 pm    Pharmacy hours:  Phone [phone]  Monday 8:30 am-7pm  Tuesday-Friday 8:30am-6 pm                                       Mychart assistance [phone]        We would like to hear from you, how was your visit today?    Dionne Phan  Patient Information Supervisor   Patient Care Supervisor  [facility], and [facility], and [facility]  [phone]

## 2018-08-06 ENCOUNTER — PRENATAL OFFICE VISIT (OUTPATIENT)
Dept: FAMILY MEDICINE | Facility: OTHER | Age: 23
End: 2018-08-06
Payer: COMMERCIAL

## 2018-08-06 VITALS
WEIGHT: 161.3 LBS | DIASTOLIC BLOOD PRESSURE: 78 MMHG | RESPIRATION RATE: 16 BRPM | HEART RATE: 112 BPM | TEMPERATURE: 98 F | BODY MASS INDEX: 29.03 KG/M2 | SYSTOLIC BLOOD PRESSURE: 102 MMHG

## 2018-08-06 DIAGNOSIS — O36.5930 POOR FETAL GROWTH AFFECTING MANAGEMENT OF MOTHER IN THIRD TRIMESTER, SINGLE OR UNSPECIFIED FETUS: ICD-10-CM

## 2018-08-06 DIAGNOSIS — R82.90 CLOUDY URINE: ICD-10-CM

## 2018-08-06 DIAGNOSIS — O26.899 RH NEGATIVE STATE IN ANTEPARTUM PERIOD, UNSPECIFIED TRIMESTER: ICD-10-CM

## 2018-08-06 DIAGNOSIS — Z67.91 RH NEGATIVE STATE IN ANTEPARTUM PERIOD, UNSPECIFIED TRIMESTER: ICD-10-CM

## 2018-08-06 DIAGNOSIS — Z34.80 ENCOUNTER FOR SUPERVISION OF OTHER NORMAL PREGNANCY, UNSPECIFIED TRIMESTER: Primary | ICD-10-CM

## 2018-08-06 LAB
ALBUMIN UR-MCNC: NEGATIVE MG/DL
APPEARANCE UR: CLEAR
BILIRUB UR QL STRIP: NEGATIVE
COLOR UR AUTO: YELLOW
GLUCOSE UR STRIP-MCNC: NEGATIVE MG/DL
HGB UR QL STRIP: NEGATIVE
KETONES UR STRIP-MCNC: NEGATIVE MG/DL
LEUKOCYTE ESTERASE UR QL STRIP: NEGATIVE
NITRATE UR QL: NEGATIVE
PH UR STRIP: 7 PH (ref 5–7)
SOURCE: NORMAL
SP GR UR STRIP: 1.01 (ref 1–1.03)
UROBILINOGEN UR STRIP-ACNC: 0.2 EU/DL (ref 0.2–1)

## 2018-08-06 PROCEDURE — 81003 URINALYSIS AUTO W/O SCOPE: CPT | Performed by: FAMILY MEDICINE

## 2018-08-06 PROCEDURE — 99207 ZZC COMPLICATED OB VISIT: CPT | Performed by: FAMILY MEDICINE

## 2018-08-06 ASSESSMENT — PAIN SCALES - GENERAL: PAINLEVEL: NO PAIN (0)

## 2018-08-06 NOTE — MR AVS SNAPSHOT
After Visit Summary   8/6/2018    Deanna Leahy    MRN: [id number]           Patient Information     Date Of Birth          1995        Visit Information        Provider Department      8/6/2018 4:30 PM Romulo Basilio MD [facility]        Today's Diagnoses     Encounter for supervision of other normal pregnancy, unspecified trimester    -  1    Poor fetal growth affecting management of mother in third trimester, single or unspecified fetus        Rh negative state in antepartum period, unspecified trimester        Cloudy urine          Care Instructions    Thank you for visiting [facility]    Let's check an ultrasound to verify baby is doing well.     We'll let you know your lab results as soon as we can.     If your symptoms are more bothersome, let me know.    Please Follow Up when indicated on the section below this one on your After-Visit Summary.      If you had imaging scheduled please refer to your radiology prep sheet.    Appointment    Date_______________     Time_____________    Day:   M TU W TH F    With____________________________    Location_________________________    If you need medication refills, please contact your pharmacy 3 days before your prescriptions runs out. If you are out of refills, your pharmacy will contact contact the clinic.    Contact us or return if questions or concerns.     -Your Care Team:  MD Sho Hooks PA-C Joel De Haan, PA-C Elizabeth McLean, APRN CNP    General information about your clinic      Clinic hours:     Lab hours:  Phone [phone]  Monday 7:30 am-7 pm    Monday 8:30 am-6:30 pm  Tuesday-Friday 7:30 am-5 pm   Tuesday-Friday 8:30 am-4:30 pm    Pharmacy hours:  Phone [phone]  Monday 8:30 am-7pm  Tuesday-Friday 8:30am-6 pm                                       Mychart assistance [phone]        We would like to hear from you, how was your visit  today?    Dionne Phan  Patient Information Supervisor   Patient Care Supervisor  [facility], and [facility], and [facility]  [phone]             Follow-ups after your visit        Follow-up notes from your care team     Return in about 2 weeks (around 8/20/2018) for Recheck.      Your next 10 appointments already scheduled     Aug 10, 2018 11:10 AM CDT    OB SINGLE FOLLOW UP REPEAT with [id number]   [facility] [place] ([facility])    [address]  [facility] [address] [phone]           Please bring a list of your medicines (including vitamins, minerals and over-the-counter drugs). Also, tell your doctor about any allergies you may have. Wear comfortable clothes and leave your valuables at home.  Drink four 8-ounce glasses of fluid an hour before your exam. If you need to empty your bladder before your exam, try to release only a little urine. Then, drink another glass of fluid.  You may have up to two family members in the exam room. If you bring a small child, an adult must be there to care for him or her. No video or camera photography during the procedure.  Please call the Imaging Department at your exam site with any questions.              Future tests that were ordered for you today     Open Future Orders        Priority Expected Expires Ordered    US OB Single Follow Up Repeat Routine  8/6/2019 8/6/2018            Who to contact     If you have questions or need follow up information about today's clinic visit or your schedule please contact [facility] at [phone].  Normal or non-critical lab and imaging results will be communicated to you by MyChart, letter or phone within 4 business days after the clinic has received the results. If you do not hear from us within 7 days, please contact the clinic through MyChart or phone. If you have a critical or  abnormal lab result, we will notify you by phone as soon as possible.  Submit refill requests through [employer] or call your pharmacy and they will forward the refill request to us. Please allow 3 business days for your refill to be completed.          Additional Information About Your Visit        [employer] Information     [employer] gives you secure access to your electronic health record. If you see a primary care provider, you can also send messages to your care team and make appointments. If you have questions, please call your primary care clinic.  If you do not have a primary care provider, please call [phone] and they will assist you.        Care EveryWhere ID     This is your Care EveryWhere ID. This could be used by other organizations to access your [place] medical records  [id number]        Your Vitals Were     Pulse Temperature Respirations Last Period BMI (Body Mass Index)       112 98  F (36.7  C) (Temporal) 16 12/25/2017 (Exact Date) 29.03 kg/m2        Blood Pressure from Last 3 Encounters:   08/06/18 102/78   07/20/18 100/70   06/21/18 104/70    Weight from Last 3 Encounters:   08/06/18 161 lb 4.8 oz (73.2 kg)   07/20/18 153 lb (69.4 kg)   06/21/18 152 lb 9.6 oz (69.2 kg)              We Performed the Following     UA reflex to Microscopic and Culture        Primary Care Provider Office Phone # Fax #    Sho De La Torre PA-C [phone] [address] [place] [address]        Equal Access to Services     [facility]: Hadii aad ku hadasho Soomaali, waaxda luqadaha, qaybta kaalmada adeegyada, jasmyn leahy . So [facility] [phone].    ATENCIÓN: Si habla español, tiene a simons disposición servicios gratuitos de asistencia lingüística. Llame al [phone].    We comply with applicable federal civil rights laws and [place] laws. We do not discriminate on the basis of race, color, national origin, age, disability, sex, sexual orientation, or gender  identity.            Thank you!     Thank you for choosing [facility]  for your care. Our goal is always to provide you with excellent care. Hearing back from our patients is one way we can continue to improve our services. Please take a few minutes to complete the written survey that you may receive in the mail after your visit with us. Thank you!             Your Updated Medication List - Protect others around you: Learn how to safely use, store and throw away your medicines at www.disposemymeds.org.          This list is accurate as of 8/6/18  5:07 PM.  Always use your most recent med list.                   Brand Name Dispense Instructions for use Diagnosis    order for DME     1 Device    Equipment being ordered:Breast pump    Breast feeding status of mother       prenatal multivitamin plus iron 27-0.8 MG Tabs per tablet     100 tablet    Take 1 tablet by mouth daily    Pregnancy examination or test, pregnancy unconfirmed

## 2018-08-06 NOTE — PATIENT INSTRUCTIONS
Thank you for visiting [facility]    Let's check an ultrasound to verify baby is doing well.     We'll let you know your lab results as soon as we can.     If your symptoms are more bothersome, let me know.    Please Follow Up when indicated on the section below this one on your After-Visit Summary.      If you had imaging scheduled please refer to your radiology prep sheet.    Appointment    Date_______________     Time_____________    Day:   M TU W TH F    With____________________________    Location_________________________    If you need medication refills, please contact your pharmacy 3 days before your prescriptions runs out. If you are out of refills, your pharmacy will contact contact the clinic.    Contact us or return if questions or concerns.     -Your Care Team:  MD Sho Hooks PA-C Joel De Haan, PA-C Elizabeth McLean, APRN CNP    General information about your clinic      Clinic hours:     Lab hours:  Phone [phone]  Monday 7:30 am-7 pm    Monday 8:30 am-6:30 pm  Tuesday-Friday 7:30 am-5 pm   Tuesday-Friday 8:30 am-4:30 pm    Pharmacy hours:  Phone [phone]  Monday 8:30 am-7pm  Tuesday-Friday 8:30am-6 pm                                       Mychart assistance [phone]        We would like to hear from you, how was your visit today?    Dionne Phan  Patient Information Supervisor   Patient Care Supervisor  [facility], and hospitals, and [facility] [address] [facility]  [phone]

## 2018-08-06 NOTE — PROGRESS NOTES
"No bleeding, no LOF.  Some Terrence-Melendez.  Dizziness and hand swelling for 1-2 weeks.  Not really correlating with each other.  Will come and go.  Dizziness is more of a sensation of being off-balance and in a brain \"fog\".  Mother opted to skip Tdap since she received it just before pregnancy.  Measuring small for dates, will obtain us to verify interval growth.  Cephalic position confirmed by Leopold maneuvers.  Prenatal flowsheet information is reviewed.  Discussed PTL, PROM, and when to call or come in.  Reportable signs and symptoms discussed.  Will obtain us since she continues to measure small.  F/u in 2 weeks.    "

## 2018-08-10 ENCOUNTER — RADIANT APPOINTMENT (OUTPATIENT)
Dept: ULTRASOUND IMAGING | Facility: OTHER | Age: 23
End: 2018-08-10
Attending: FAMILY MEDICINE
Payer: COMMERCIAL

## 2018-08-10 DIAGNOSIS — Z67.91 RH NEGATIVE STATE IN ANTEPARTUM PERIOD, UNSPECIFIED TRIMESTER: ICD-10-CM

## 2018-08-10 DIAGNOSIS — O26.899 RH NEGATIVE STATE IN ANTEPARTUM PERIOD, UNSPECIFIED TRIMESTER: ICD-10-CM

## 2018-08-10 DIAGNOSIS — O36.5930 POOR FETAL GROWTH AFFECTING MANAGEMENT OF MOTHER IN THIRD TRIMESTER, SINGLE OR UNSPECIFIED FETUS: ICD-10-CM

## 2018-08-10 DIAGNOSIS — Z34.80 ENCOUNTER FOR SUPERVISION OF OTHER NORMAL PREGNANCY, UNSPECIFIED TRIMESTER: ICD-10-CM

## 2018-08-10 PROCEDURE — 76816 OB US FOLLOW-UP PER FETUS: CPT

## 2018-08-13 NOTE — PROGRESS NOTES
Deanna,    Growth looks good, but baby was breech.  If she doesn't turn over the next few weeks, we'll need to discuss options for managing this.    Thanks,    Dr. Basilio

## 2018-08-15 NOTE — PROGRESS NOTES
Concerns: ***  {OB29-35:431210}  Discussed kick counts and fetal movement.  Discussed PTL, PROM, and when to call or come in.  Checklist updated, see prenatal flowsheet for details  RTC 2 weeks.    Romulo Basilio MD, MD

## 2018-08-20 ENCOUNTER — PRENATAL OFFICE VISIT (OUTPATIENT)
Dept: FAMILY MEDICINE | Facility: OTHER | Age: 23
End: 2018-08-20
Payer: COMMERCIAL

## 2018-08-20 VITALS
BODY MASS INDEX: 28.92 KG/M2 | HEART RATE: 96 BPM | DIASTOLIC BLOOD PRESSURE: 72 MMHG | TEMPERATURE: 97.8 F | RESPIRATION RATE: 16 BRPM | WEIGHT: 160.7 LBS | SYSTOLIC BLOOD PRESSURE: 106 MMHG

## 2018-08-20 DIAGNOSIS — Z34.80 ENCOUNTER FOR SUPERVISION OF OTHER NORMAL PREGNANCY, UNSPECIFIED TRIMESTER: Primary | ICD-10-CM

## 2018-08-20 DIAGNOSIS — O26.899 RH NEGATIVE STATE IN ANTEPARTUM PERIOD, UNSPECIFIED TRIMESTER: ICD-10-CM

## 2018-08-20 DIAGNOSIS — Z67.91 RH NEGATIVE STATE IN ANTEPARTUM PERIOD, UNSPECIFIED TRIMESTER: ICD-10-CM

## 2018-08-20 PROCEDURE — 99207 ZZC COMPLICATED OB VISIT: CPT | Performed by: FAMILY MEDICINE

## 2018-08-20 ASSESSMENT — PAIN SCALES - GENERAL: PAINLEVEL: NO PAIN (0)

## 2018-08-20 NOTE — MR AVS SNAPSHOT
After Visit Summary   8/20/2018    Deanna Leahy    MRN: [id number]           Patient Information     Date Of Birth          1995        Visit Information        Provider Department      8/20/2018 8:15 AM Romulo Basilio MD [facility]        Care Instructions    Thank you for visiting [facility]    Keep up the good work!    Hopefully, your baby will move.    Contact us or return if questions or concerns.     Please Follow Up when indicated on the section below this one on your After-Visit Summary.      If you had imaging scheduled please refer to your radiology prep sheet.    Appointment    Date_______________     Time_____________    Day:   M TU W TH F    With____________________________    Location_________________________    If you need medication refills, please contact your pharmacy 3 days before your prescriptions runs out. If you are out of refills, your pharmacy will contact contact the clinic.    Contact us or return if questions or concerns.     -Your Care Team:  MD Sho Hooks PA-C Joel De Haan, PA-C Elizabeth McLean, APRN CNP    General information about your clinic      Clinic hours:     Lab hours:  Phone [phone]  Monday 7:30 am-7 pm    Monday 8:30 am-6:30 pm  Tuesday-Friday 7:30 am-5 pm   Tuesday-Friday 8:30 am-4:30 pm    Pharmacy hours:  Phone [phone]  Monday 8:30 am-7pm  Tuesday-Friday 8:30am-6 pm                                       Mychart assistance [phone]        We would like to hear from you, how was your visit today?    Dionne Phan  Patient Information Supervisor   Patient Care Supervisor  [facility], and \[facility]", and [facility]  [phone]             Follow-ups after your visit        Follow-up notes from your care team     Return in about 2 weeks (around 9/3/2018) for Recheck.      Who to  contact     If you have questions or need follow up information about today's clinic visit or your schedule please contact [facility] [place] directly at [phone].  Normal or non-critical lab and imaging results will be communicated to you by MyChart, letter or phone within 4 business days after the clinic has received the results. If you do not hear from us within 7 days, please contact the clinic through MedCPUhart or phone. If you have a critical or abnormal lab result, we will notify you by phone as soon as possible.  Submit refill requests through Gushcloud or call your pharmacy and they will forward the refill request to us. Please allow 3 business days for your refill to be completed.          Additional Information About Your Visit        Gushcloud Information     Gushcloud gives you secure access to your electronic health record. If you see a primary care provider, you can also send messages to your care team and make appointments. If you have questions, please call your primary care clinic.  If you do not have a primary care provider, please call [phone] and they will assist you.        Care EveryWhere ID     This is your Care EveryWhere ID. This could be used by other organizations to access your [place] medical records  [id number]        Your Vitals Were     Pulse Temperature Respirations Last Period BMI (Body Mass Index)       96 97.8  F (36.6  C) (Temporal) 16 12/25/2017 (Exact Date) 28.92 kg/m2        Blood Pressure from Last 3 Encounters:   08/20/18 106/72   08/06/18 102/78   07/20/18 100/70    Weight from Last 3 Encounters:   08/20/18 160 lb 11.2 oz (72.9 kg)   08/06/18 161 lb 4.8 oz (73.2 kg)   07/20/18 153 lb (69.4 kg)              Today, you had the following     No orders found for display       Primary Care Provider Office Phone # Fax #    Sho De La Torre PA-C [phone] [address] [place] [address]        Equal Access to Services     CARLOS CONN: Franci  kam Wood, hollie plazadontrellha, qachiquista kamagdalena anirudhscott, waxselena jake perezgabrieledavy leahy loki. So [facility] [phone].    ATENCIÓN: Si habla español, tiene a simons disposición servicios gratuitos de asistencia lingüística. Llame al [phone].    We comply with applicable federal civil rights laws and [place] laws. We do not discriminate on the basis of race, color, national origin, age, disability, sex, sexual orientation, or gender identity.            Thank you!     Thank you for choosing [facility]  for your care. Our goal is always to provide you with excellent care. Hearing back from our patients is one way we can continue to improve our services. Please take a few minutes to complete the written survey that you may receive in the mail after your visit with us. Thank you!             Your Updated Medication List - Protect others around you: Learn how to safely use, store and throw away your medicines at www.disposemymeds.org.          This list is accurate as of 8/20/18  8:30 AM.  Always use your most recent med list.                   Brand Name Dispense Instructions for use Diagnosis    order for DME     1 Device    Equipment being ordered:Breast pump    Breast feeding status of mother       prenatal multivitamin plus iron 27-0.8 MG Tabs per tablet     100 tablet    Take 1 tablet by mouth daily    Pregnancy examination or test, pregnancy unconfirmed

## 2018-08-20 NOTE — PROGRESS NOTES
No ctx, no bleeding, no LOF.  Continues to decline Tdap.  Dicussed breech management options today.  She will consider these.   Breech position confirmed by Leopold maneuvers.  Discussed PTL, PROM, and when to call or come in.  C-sections discussed with the patient.  She will consider this.  Reportable signs and symptoms discussed.  F/u in 2 weeks.

## 2018-08-20 NOTE — PATIENT INSTRUCTIONS
Thank you for visiting [facility]    Keep up the good work!    Hopefully, your baby will move.    Contact us or return if questions or concerns.     Please Follow Up when indicated on the section below this one on your After-Visit Summary.      If you had imaging scheduled please refer to your radiology prep sheet.    Appointment    Date_______________     Time_____________    Day:   M TU W TH F    With____________________________    Location_________________________    If you need medication refills, please contact your pharmacy 3 days before your prescriptions runs out. If you are out of refills, your pharmacy will contact contact the clinic.    Contact us or return if questions or concerns.     -Your Care Team:  MD Sho Hooks PA-C Joel De Haan, PA-C Elizabeth McLean, APRN CNP    General information about your clinic      Clinic hours:     Lab hours:  Phone [phone]  Monday 7:30 am-7 pm    Monday 8:30 am-6:30 pm  Tuesday-Friday 7:30 am-5 pm   Tuesday-Friday 8:30 am-4:30 pm    Pharmacy hours:  Phone [phone]  Monday 8:30 am-7pm  Tuesday-Friday 8:30am-6 pm                                       Mychart assistance [phone]        We would like to hear from you, how was your visit today?    Dionne Phan  Patient Information Supervisor   Patient Care Supervisor  [facility], and [facility], and [facility]  [phone]

## 2018-08-29 NOTE — PROGRESS NOTES
Concerns: just breech.  Does have a rapid heart rate at times.  Leaning towards scheduled .  No vaginal bleeding, LOF, minimal contractions.  No HA, RUQ pain, N/V, visual changes.  Breech position confirmed by Leopold maneuvers.  Discussed options.  Pt would like to consider scheduled  if continued breech presentation.  Will refer to Dr. Cardenas for consultation as he's on call the day she's 39 weeks.  Reportable signs and symptoms discussed.  GBS done today.  Labor precautions discussed.  RTC 1 week.  Prenatal flowsheet information is reviewed.    Romulo Basilio MD, MD

## 2018-09-05 ENCOUNTER — PRENATAL OFFICE VISIT (OUTPATIENT)
Dept: FAMILY MEDICINE | Facility: OTHER | Age: 23
End: 2018-09-05
Payer: COMMERCIAL

## 2018-09-05 VITALS
HEART RATE: 100 BPM | WEIGHT: 161.5 LBS | RESPIRATION RATE: 16 BRPM | BODY MASS INDEX: 29.07 KG/M2 | DIASTOLIC BLOOD PRESSURE: 78 MMHG | TEMPERATURE: 97.5 F | SYSTOLIC BLOOD PRESSURE: 110 MMHG

## 2018-09-05 DIAGNOSIS — O26.899 RH NEGATIVE STATE IN ANTEPARTUM PERIOD, UNSPECIFIED TRIMESTER: ICD-10-CM

## 2018-09-05 DIAGNOSIS — Z67.91 RH NEGATIVE STATE IN ANTEPARTUM PERIOD, UNSPECIFIED TRIMESTER: ICD-10-CM

## 2018-09-05 DIAGNOSIS — Z34.80 ENCOUNTER FOR SUPERVISION OF OTHER NORMAL PREGNANCY, UNSPECIFIED TRIMESTER: Primary | ICD-10-CM

## 2018-09-05 DIAGNOSIS — Z36.85 SCREENING, ANTENATAL, FOR STREPTOCOCCUS B: ICD-10-CM

## 2018-09-05 PROCEDURE — 99207 ZZC COMPLICATED OB VISIT: CPT | Performed by: FAMILY MEDICINE

## 2018-09-05 PROCEDURE — 87653 STREP B DNA AMP PROBE: CPT | Performed by: FAMILY MEDICINE

## 2018-09-05 ASSESSMENT — PAIN SCALES - GENERAL: PAINLEVEL: NO PAIN (0)

## 2018-09-05 NOTE — PATIENT INSTRUCTIONS
Thank you for visiting [facility]    Let's have you see Dr. Cardenas for a consultation regarding your breech presentation.    Contact us or return if questions or concerns.     Please Follow Up when indicated on the section below this one on your After-Visit Summary.      If you had imaging scheduled please refer to your radiology prep sheet.    Appointment    Date_______________     Time_____________    Day:   M TU W TH F    With____________________________    Location_________________________    If you need medication refills, please contact your pharmacy 3 days before your prescriptions runs out. If you are out of refills, your pharmacy will contact contact the clinic.    Contact us or return if questions or concerns.     -Your Care Team:  MD Sho Hooks PA-C Joel De Haan, PA-C Elizabeth McLean, APRN CNP    General information about your clinic      Clinic hours:     Lab hours:  Phone [phone]  Monday 7:30 am-7 pm    Monday 8:30 am-6:30 pm  Tuesday-Friday 7:30 am-5 pm   Tuesday-Friday 8:30 am-4:30 pm    Pharmacy hours:  Phone [phone]  Monday 8:30 am-7pm  Tuesday-Friday 8:30am-6 pm                                       Mychart assistance [phone]        We would like to hear from you, how was your visit today?    Dionne Phan  Patient Information Supervisor   Patient Care Supervisor  [facility] [place], and [facility], and [facility]  [phone]

## 2018-09-05 NOTE — MR AVS SNAPSHOT
After Visit Summary   2018    Deanna Leahy    MRN: [id number]           Patient Information     Date Of Birth          1995        Visit Information        Provider Department      2018 8:00 AM Romulo Basilio MD [facility]        Today's Diagnoses     Encounter for supervision of other normal pregnancy, unspecified trimester    -  1    Breech presentation, single or unspecified fetus        Rh negative state in antepartum period, unspecified trimester        Screening, , for Streptococcus B          Care Instructions    Thank you for visiting [facility]    Let's have you see Dr. Cardenas for a consultation regarding your breech presentation.    Contact us or return if questions or concerns.     Please Follow Up when indicated on the section below this one on your After-Visit Summary.      If you had imaging scheduled please refer to your radiology prep sheet.    Appointment    Date_______________     Time_____________    Day:       With____________________________    Location_________________________    If you need medication refills, please contact your pharmacy 3 days before your prescriptions runs out. If you are out of refills, your pharmacy will contact contact the clinic.    Contact us or return if questions or concerns.     -Your Care Team:  MD Sho Hooks PA-C Joel De Haan, PA-C Elizabeth McLean, APRN CNP    General information about your clinic      Clinic hours:     Lab hours:  Phone [phone]  Monday 7:30 am-7 pm    Monday 8:30 am-6:30 pm   7:30 am-5 pm    8:30 am-4:30 pm    Pharmacy hours:  Phone [phone]  Monday 8:30 am-7pm   8:30am-6 pm                                       Mychart assistance [phone]        We would like to hear from you, how was your visit today?    Dionne Phan  Patient Information  Supervisor   Patient Care Supervisor  [facility], [facility], and [facility]  [phone]             Follow-ups after your visit        Follow-up notes from your care team     Return in about 1 week (around 9/12/2018) for Recheck.      Your next 10 appointments already scheduled     Sep 12, 2018 10:40 AM CDT   ESTABLISHED PRENATAL with [facility] C[facility]as MD   [facility]    [address] [facility] [address] [phone]              Who to contact     If you have questions or need follow up information about today's clinic visit or your schedule please contact [facility] directly at [phone].  Normal or non-critical lab and imaging results will be communicated to you by MyChart, letter or phone within 4 business days after the clinic has received the results. If you do not hear from us within 7 days, please contact the clinic through Align Technologyhart or phone. If you have a critical or abnormal lab result, we will notify you by phone as soon as possible.  Submit refill requests through Gyros or call your pharmacy and they will forward the refill request to us. Please allow 3 business days for your refill to be completed.          Additional Information About Your Visit        MyChart Information     Gyros gives you secure access to your electronic health record. If you see a primary care provider, you can also send messages to your care team and make appointments. If you have questions, please call your primary care clinic.  If you do not have a primary care provider, please call [phone] and they will assist you.        Care EveryWhere ID     This is your Care EveryWhere ID. This could be used by other organizations to access your [place] medical records  [id number]        Your Vitals Were     Pulse Temperature Respirations Last Period BMI (Body Mass Index)       100 97.5   F (36.4  C) (Temporal) 16 12/25/2017 (Exact Date) 29.07 kg/m2        Blood Pressure from Last 3 Encounters:   09/05/18 110/78   08/20/18 106/72   08/06/18 102/78    Weight from Last 3 Encounters:   09/05/18 161 lb 8 oz (73.3 kg)   08/20/18 160 lb 11.2 oz (72.9 kg)   08/06/18 161 lb 4.8 oz (73.2 kg)              We Performed the Following     Strep, Group B by PCR        Primary Care Provider Office Phone # Fax #    Sho De La Torre PA-C [phone] [address] [place] [address]        Equal Access to Services     CARLOS BELTRAN : Hadii kam longo Somichael, waaxda luqadaha, qaybta kaalmada adeegyada, jasmyn leahy . So [facility] [phone].    ATENCIÓN: Si habla español, tiene a simons disposición servicios gratuitos de asistencia lingüística. Llame al [phone].    We comply with applicable federal civil rights laws and [place] laws. We do not discriminate on the basis of race, color, national origin, age, disability, sex, sexual orientation, or gender identity.            Thank you!     Thank you for choosing [facility]  for your care. Our goal is always to provide you with excellent care. Hearing back from our patients is one way we can continue to improve our services. Please take a few minutes to complete the written survey that you may receive in the mail after your visit with us. Thank you!             Your Updated Medication List - Protect others around you: Learn how to safely use, store and throw away your medicines at www.disposemymeds.org.          This list is accurate as of 9/5/18  8:24 AM.  Always use your most recent med list.                   Brand Name Dispense Instructions for use Diagnosis    order for DME     1 Device    Equipment being ordered:Breast pump    Breast feeding status of mother       prenatal multivitamin plus iron 27-0.8 MG Tabs per tablet     100 tablet    Take 1 tablet by mouth daily    Pregnancy examination or  test, pregnancy unconfirmed

## 2018-09-06 LAB
GP B STREP DNA SPEC QL NAA+PROBE: NEGATIVE
SPECIMEN SOURCE: NORMAL

## 2018-09-06 ASSESSMENT — PATIENT HEALTH QUESTIONNAIRE - PHQ9: SUM OF ALL RESPONSES TO PHQ QUESTIONS 1-9: 3

## 2018-09-06 NOTE — PROGRESS NOTES
"  SUBJECTIVE:   Deanna Leahy is a 23 year old female who presents to clinic today for the following health issues:  {Provider please address medication reconciliation discrepancies--rooming staff please delete if no med/rec issues}    HPI  Patient here for prenatal visit and  consult      Problem list and histories reviewed & adjusted, as indicated.  Additional history: {NONE - AS DOCUMENTED:571574::\"as documented\"}    {ACUTE Problem SUPERLIST - brief histories:822333}    {HIST REVIEW/ LINKS 2:569104}    {PROVIDER CHARTING PREFERENCE:774592}  "

## 2018-09-12 ENCOUNTER — PRENATAL OFFICE VISIT (OUTPATIENT)
Dept: FAMILY MEDICINE | Facility: OTHER | Age: 23
End: 2018-09-12
Payer: COMMERCIAL

## 2018-09-12 VITALS
SYSTOLIC BLOOD PRESSURE: 104 MMHG | BODY MASS INDEX: 29.41 KG/M2 | DIASTOLIC BLOOD PRESSURE: 74 MMHG | TEMPERATURE: 97.8 F | HEART RATE: 64 BPM | RESPIRATION RATE: 16 BRPM | WEIGHT: 163.4 LBS

## 2018-09-12 DIAGNOSIS — O09.93 SUPERVISION OF HIGH RISK PREGNANCY IN THIRD TRIMESTER: ICD-10-CM

## 2018-09-12 PROCEDURE — 99214 OFFICE O/P EST MOD 30 MIN: CPT | Performed by: FAMILY MEDICINE

## 2018-09-12 NOTE — MR AVS SNAPSHOT
After Visit Summary   9/12/2018    Deanna Leahy    MRN: [id number]           Patient Information     Date Of Birth          1995        Visit Information        Provider Department      9/12/2018 10:40 AM Douglas Cardenas MD [facility]        Today's Diagnoses     Breech presentation, single or unspecified fetus    -  1    Supervision of high risk pregnancy in third trimester           Follow-ups after your visit        Your next 10 appointments already scheduled     Sep 19, 2018  8:00 AM CDT   ESTABLISHED PRENATAL with Romulo Basilio MD   [facility]    [address] [address] [phone]              Who to contact     If you have questions or need follow up information about today's clinic visit or your schedule please contact [facility] directly at [phone].  Normal or non-critical lab and imaging results will be communicated to you by MyChart, letter or phone within 4 business days after the clinic has received the results. If you do not hear from us within 7 days, please contact the clinic through MyChart or phone. If you have a critical or abnormal lab result, we will notify you by phone as soon as possible.  Submit refill requests through Sossee or call your pharmacy and they will forward the refill request to us. Please allow 3 business days for your refill to be completed.          Additional Information About Your Visit        MyChart Information     Sossee gives you secure access to your electronic health record. If you see a primary care provider, you can also send messages to your care team and make appointments. If you have questions, please call your primary care clinic.  If you do not have a primary care provider, please call [phone] and they will assist you.        Care EveryWhere ID     This is your Care EveryWhere ID. This could be used by other  organizations to access your [place] medical records  [id number]        Your Vitals Were     Pulse Temperature Respirations Last Period BMI (Body Mass Index)       64 97.8  F (36.6  C) (Temporal) 16 2017 (Exact Date) 29.41 kg/m2        Blood Pressure from Last 3 Encounters:   18 104/74   18 110/78   18 106/72    Weight from Last 3 Encounters:   18 163 lb 6.4 oz (74.1 kg)   18 161 lb 8 oz (73.3 kg)   18 160 lb 11.2 oz (72.9 kg)              We Performed the Following     Evonne-Operative Worksheet  Section        Primary Care Provider Office Phone # Fax #    Sho De La Torre PA-C [phone] [address] [place] [address]        Equal Access to Services     [facility]: Hadii aad ku hadasho Somichael, waaxda luqadaha, qaybta kaalmada ademelissayada, jasmyn leahy . So [facility] [phone].    ATENCIÓN: Si habla español, tiene a simons disposición servicios gratuitos de asistencia lingüística. Llame al [phone].    We comply with applicable federal civil rights laws and [place] laws. We do not discriminate on the basis of race, color, national origin, age, disability, sex, sexual orientation, or gender identity.            Thank you!     Thank you for choosing [facility] [place]  for your care. Our goal is always to provide you with excellent care. Hearing back from our patients is one way we can continue to improve our services. Please take a few minutes to complete the written survey that you may receive in the mail after your visit with us. Thank you!             Your Updated Medication List - Protect others around you: Learn how to safely use, store and throw away your medicines at www.disposemymeds.org.          This list is accurate as of 18 11:59 PM.  Always use your most recent med list.                   Brand Name Dispense Instructions for use Diagnosis    order for DME     1 Device    Equipment  being ordered:Breast pump    Breast feeding status of mother       prenatal multivitamin plus iron 27-0.8 MG Tabs per tablet     100 tablet    Take 1 tablet by mouth daily    Pregnancy examination or test, pregnancy unconfirmed

## 2018-09-12 NOTE — PROGRESS NOTES
Deanna Leahy is in today for a consultation for a primary .  Patient was referred to me by Dr. Basilio.  She is a 23 year old female who is a , and is 37 2/7 wks gestation.  She does not want a trial of labor and had been referred to me for a ILTCS due to a persistent breech presentation.    MEDICAL HISTORY  Past Medical History:   Diagnosis Date     NO ACTIVE PROBLEMS        SURGICAL HISTORY   Past Surgical History:   Procedure Laterality Date     NO HISTORY OF SURGERY         ALLERGIES     Allergies   Allergen Reactions     No Known Drug Allergies        CURRENT MEDICATIONS    Current Outpatient Prescriptions:      Prenatal Vit-Fe Fumarate-FA (PRENATAL MULTIVITAMIN PLUS IRON) 27-0.8 MG TABS per tablet, Take 1 tablet by mouth daily, Disp: 100 tablet, Rfl: 3     order for DME, Equipment being ordered:Breast pump (Patient not taking: Reported on 2018), Disp: 1 Device, Rfl: 0    SOCIAL HISTORY  Social History     Social History     Marital status:      Spouse name: N/A     Number of children: N/A     Years of education: N/A     Occupational History     Not on file.     Social History Main Topics     Smoking status: Never Smoker     Smokeless tobacco: Never Used     Alcohol use No     Drug use: No     Sexual activity: Yes     Partners: Male     Birth control/ protection: None     Other Topics Concern     Parent/Sibling W/ Cabg, Mi Or Angioplasty Before 65f 55m? No     Social History Narrative    2018  Lives in [place] with , Claudio.  They have a roommate.  No smokers in the household.  No concerns about domestic violence.  Has two indoor cats.  Aware of toxoplasmosis precautions.  Deanna works at [employer].  Claudio is a .       FAMILY HISTORY  Family History   Problem Relation Age of Onset     C.A.D. Maternal Grandfather      50's?     Hyperlipidemia Mother      Anemia Mother      Crohn Disease Father      Cancer Paternal Grandmother      lung     Prostate Cancer Paternal  Grandfather        IMMUNIZATIONS  Immunization History   Administered Date(s) Administered     DTAP (<7y) 2000     HepB 1996, 1996, 1996     MMR 1996, 2000     Meningococcal (Menactra ) 2011     Meningococcal (Menomune ) 2011     OPV, trivalent, live 1995, 1995, 1996     Poliovirus, inactivated (IPV) 2000     Rhogam 2018     TDAP Vaccine (Adacel) 2007, 2017     Tetramune (DtP/HIB) 1995, 1995, 1995, 1995, 1995, 1995, 1996, 1996     Varicella 2007, 2011       We discussed the potential complications of a  delivery.  We reviewed the entire consent form and the risks involved.  In general with a primary  the risks include but are not limited to blood loss which is on average 1000cc, post operative pain, infection, and potential injury to bowel bladder, other internal organs, and large blood vessels which could result in significant hemorrhage.  There is also potential for injury to the fetus, possible nerve damage or temporary loss of limb function or even temporary paralysis, and blood clots in the legs or pelvis. The ultimate risk would be loss of life.  With bleeding, if there is excessive blood loss, there could be the need for transfusion or even the need to do a  hysterectomy.    Regarding blood loss, there can be anywhere from 300 to 3000cc with the average being about 1000cc for a primary section.  If there is excessive blood loss or hemorrhage with a postpartum bleed or injury to uterine vessels during a  delivery, there may be need for blood transfusion and the inherent risks that come along with that including risk of HIV and hepatitis.  That risk of obtaining tainted blood is less than 1:100,000.  The patient and her partner had no further questions and had all of them answered to her satisfaction.    I also reviewed with her  ways in which we could monitor her labor actively with pitocin and internal monitors and if she did not progress with in the normal patterns according to the Rowley curve that we could then move on to  and not prolong her labor unnecessarily.  The patient was receptive to hearing this option, but still desires a primary section as her mode of delivery.    Objective:  /74 (BP Location: Left arm, Patient Position: Chair, Cuff Size: Adult Regular)  Pulse 64  Temp 97.8  F (36.6  C) (Temporal)  Resp 16  Wt 163 lb 6.4 oz (74.1 kg)  LMP 2017 (Exact Date)  BMI 29.41 kg/m2    Exam:    Abdomen: Gravid and bedside US does show a persistent breech with head in the epigastric region, spine along maternal right and breech in the pelvis.     Assesment:  Primiparous patient requesting primary  due to persistent breech presentation.      Plan:  Primary Low Transverse Uterine  Section.    Scheduled for 18 in the am depending on what time we can secure..  The patient will follow up with primary physician for the rest of her prenatal care and will have her preop PE done within 7 days of her delivery.  If she were to experience labor prior to the date of her scheduled section, as long as she is beyond 36 wks, she should present to L&D and if in labor we would be notified and deliver her baby at that time.      30 minutes were spent with this patient during this consultation with over 50% spent in counseling regarding primary LTCS for breech.    cc: Beau Basilio M.D.    Electronically signed by:  Douglas Cardenas M.D.  2018

## 2018-09-12 NOTE — NURSING NOTE
"Chief Complaint   Patient presents with     C section consult     Panel Management     hpv, flu       Initial /74 (BP Location: Left arm, Patient Position: Chair, Cuff Size: Adult Regular)  Pulse 64  Temp 97.8  F (36.6  C) (Temporal)  Resp 16  Wt 163 lb 6.4 oz (74.1 kg)  LMP 2017 (Exact Date)  BMI 29.41 kg/m2 Estimated body mass index is 29.41 kg/(m^2) as calculated from the following:    Height as of 18: 5' 2.5\" (1.588 m).    Weight as of this encounter: 163 lb 6.4 oz (74.1 kg).  BP completed using cuff size: regular        Pricilla Canales CMA  2018          "

## 2018-09-12 NOTE — PROGRESS NOTES
Concerns: lots of contractions, tired, but typical for this stage of pregnancy  No HA, RUQ pain, N/V, visual changes.  Cephalic position confirmed by Leopold maneuvers.  C-sections discussed with the patient.  Consult completed.  Reportable signs and symptoms discussed.  Labor precautions discussed.  Prenatal flowsheet information is reviewed.  RTC 1 week.    Romulo Basilio MD      [address] [address] [phone]  Dept: [phone]    PRE-OP EVALUATION:  Today's date: 2018    Deanna Leahy (: 1995) presents for pre-operative evaluation assessment as requested by Dr. Cardenas.  She requires evaluation and anesthesia risk assessment prior to undergoing surgery/procedure for treatment of breech presentation of pregnancy.    Proposed Surgery/ Procedure: Primary low transverse   Date of Surgery/ Procedure: 2018  Time of Surgery/ Procedure:   Hospital/Surgical Facility: [facility]    Primary Physician: Sho De La Torre  Type of Anesthesia Anticipated: Spinal    Patient has a Health Care Directive or Living Will:  NO    1. NO - Do you have a history of heart attack, stroke, stent, bypass or surgery on an artery in the head, neck, heart or legs?  2. NO - Do you ever have any pain or discomfort in your chest?  3. NO - Do you have a history of  Heart Failure?  4. NO - Are you troubled by shortness of breath when: walking on the level, up a slight hill or at night?  5. NO - Do you currently have a cold, bronchitis or other respiratory infection?  6. NO - Do you have a cough, shortness of breath or wheezing?  7. NO - Do you sometimes get pains in the calves of your legs when you walk?  8. NO - Do you or anyone in your family have previous history of blood clots?  9. NO - Do you or does anyone in your family have a serious bleeding problem such as prolonged bleeding following surgeries or cuts?  10. YES - HAVE YOU EVER HAD PROBLEMS  WITH ANEMIA OR BEEN TOLD TO TAKE IRON PILLS? Iron pills  11. NO - Have you had any abnormal blood loss such as black, tarry or bloody stools, or abnormal vaginal bleeding?  12. NO - Have you ever had a blood transfusion?  13. NO - Have you or any of your relatives ever had problems with anesthesia?  14. NO - Do you have sleep apnea, excessive snoring or daytime drowsiness?  15. NO - Do you have any prosthetic heart valves?  16. NO - Do you have prosthetic joints?  17. YES - IS THERE ANY CHANCE THAT YOU MAY BE PREGNANT? currently      HPI:     HPI related to upcoming procedure: Pt noted to be breech in 3rd trimester.  Options including primary  and external cephalic version were discussed.  Pt opted for primary .      See problem list for active medical problems.  Problems all longstanding and stable, except as noted/documented.  See ROS for pertinent symptoms related to these conditions.                                                                                                                                                          .    MEDICAL HISTORY:     Patient Active Problem List    Diagnosis Date Noted     Supervision of high-risk pregnancy 2018     Priority: Medium     Breech presentation, single or unspecified fetus 2018     Priority: Medium     Acquired lactose intolerance 2018     Priority: Medium     Rh negative state in antepartum period, unspecified trimester 2018     Priority: Medium     Encounter for supervision of other normal pregnancy, unspecified trimester 2018     Priority: Medium     Gastroesophageal reflux disease, esophagitis presence not specified 2018     Priority: Medium     Iron deficiency anemia, unspecified iron deficiency anemia type 2017     Priority: Medium     CARDIOVASCULAR SCREENING; LDL GOAL LESS THAN 160 2016     Priority: Medium     Contraception 05/15/2015     Priority: Medium     Ingrown toenail 2012      Priority: Medium     Cystic acne 04/01/2011     Priority: Medium      Past Medical History:   Diagnosis Date     NO ACTIVE PROBLEMS      Supervision of high-risk pregnancy 9/14/2018     Past Surgical History:   Procedure Laterality Date     HC TOOTH EXTRACTION W/FORCEP       Current Outpatient Prescriptions   Medication Sig Dispense Refill     order for DME Equipment being ordered:Breast pump 1 Device 0     Prenatal Vit-Fe Fumarate-FA (PRENATAL MULTIVITAMIN PLUS IRON) 27-0.8 MG TABS per tablet Take 1 tablet by mouth daily 100 tablet 3     OTC products: None, except as noted above    Allergies   Allergen Reactions     No Known Drug Allergies       Latex Allergy: NO    Social History   Substance Use Topics     Smoking status: Never Smoker     Smokeless tobacco: Never Used     Alcohol use No     History   Drug Use No       REVIEW OF SYSTEMS:   CONSTITUTIONAL: NEGATIVE for fever, chills, change in weight  INTEGUMENTARY/SKIN: NEGATIVE for worrisome rashes, moles or lesions  EYES: NEGATIVE for vision changes or irritation  ENT/MOUTH: NEGATIVE for ear, mouth and throat problems  RESP: NEGATIVE for significant cough or SOB  BREAST: NEGATIVE for masses, tenderness or discharge  CV: NEGATIVE for chest pain, palpitations or peripheral edema  GI: pregnancy related heartburn, bowel changes  : NEGATIVE for frequency, dysuria, or hematuria  MUSCULOSKELETAL: NEGATIVE for significant arthralgias or myalgia  NEURO: NEGATIVE for weakness, dizziness or paresthesias  ENDOCRINE: NEGATIVE for temperature intolerance, skin/hair changes  HEME: NEGATIVE for bleeding problems  PSYCHIATRIC: NEGATIVE for changes in mood or affect    EXAM:   /78 (BP Location: Left arm, Patient Position: Chair, Cuff Size: Adult Regular)  Pulse 92  Temp 97  F (36.1  C) (Temporal)  Resp 16  Wt 167 lb 6.4 oz (75.9 kg)  LMP 12/25/2017 (Exact Date)  BMI 30.13 kg/m2    GENERAL APPEARANCE: healthy, alert and no distress     EYES: EOMI, PERRL     HENT:  ear canals and TM's normal and nose and mouth without ulcers or lesions     NECK: no adenopathy, no asymmetry, masses, or scars and thyroid normal to palpation     RESP: lungs clear to auscultation - no rales, rhonchi or wheezes     CV: regular rates and rhythm, normal S1 S2, no S3 or S4 and no murmur, click or rub     ABDOMEN:  soft, nontender, no HSM or masses and bowel sounds normal     MS: extremities normal- no gross deformities noted, no evidence of inflammation in joints, FROM in all extremities.     SKIN: no suspicious lesions or rashes     NEURO: Normal strength and tone, sensory exam grossly normal, mentation intact and speech normal     PSYCH: mentation appears normal. and affect normal/bright     LYMPHATICS: No cervical adenopathy    DIAGNOSTICS:   EKG: Not indicated due to non-vascular surgery and low risk of event (age <65 and without cardiac risk factors)    Recent Labs   Lab Test  18   1005  18   1119  17   0928   13   1915   HGB  11.7  14.1  14.3   < >  13.3   PLT   --   298  304   --   301   NA   --   137   --    --   142   POTASSIUM   --   4.3   --    --   3.8   CR   --   0.60   --    --   0.68    < > = values in this interval not displayed.        IMPRESSION:   Reason for surgery/procedure: breech presentation of fetus  Diagnosis/reason for consult: perioperative risk evaluation for primary low transverse     The proposed surgical procedure is considered INTERMEDIATE risk.    REVISED CARDIAC RISK INDEX  The patient has the following serious cardiovascular risks for perioperative complications such as (MI, PE, VFib and 3  AV Block):  No serious cardiac risks  INTERPRETATION: 0 risks: Class I (very low risk - 0.4% complication rate)    The patient has the following additional risks for perioperative complications:  No identified additional risks      ICD-10-CM    1. Preop general physical exam Z01.818    2. Breech presentation, single or unspecified fetus O32.1XX0     3. Encounter for supervision of other normal pregnancy, unspecified trimester Z34.80    4. Rh negative state in antepartum period, unspecified trimester O09.899    5. Gastroesophageal reflux disease, esophagitis presence not specified K21.9    6. Iron deficiency anemia, unspecified iron deficiency anemia type D50.9    7. Acquired lactose intolerance E73.1        RECOMMENDATIONS:       Anemia  Anemia and does not require treatment prior to surgery.  Monitor Hemoglobin postoperatively.      --Patient is to take all scheduled medications on the day of surgery EXCEPT for modifications listed below.    APPROVAL GIVEN to proceed with proposed procedure, without further diagnostic evaluation       Signed Electronically by: Romulo Basilio MD, MD    Copy of this evaluation report is provided to requesting physician.    [facility] Preop Guidelines    Revised Cardiac Risk Index

## 2018-09-14 PROBLEM — O09.90 SUPERVISION OF HIGH-RISK PREGNANCY: Status: ACTIVE | Noted: 2018-09-14

## 2018-09-14 RX ORDER — CITRIC ACID/SODIUM CITRATE 334-500MG
30 SOLUTION, ORAL ORAL
Status: CANCELLED | OUTPATIENT
Start: 2018-09-14 | End: 2038-09-15

## 2018-09-14 RX ORDER — SODIUM CHLORIDE, SODIUM LACTATE, POTASSIUM CHLORIDE, CALCIUM CHLORIDE 600; 310; 30; 20 MG/100ML; MG/100ML; MG/100ML; MG/100ML
INJECTION, SOLUTION INTRAVENOUS CONTINUOUS
Status: CANCELLED | OUTPATIENT
Start: 2018-09-14 | End: 2019-09-14

## 2018-09-15 ENCOUNTER — TELEPHONE (OUTPATIENT)
Dept: OBGYN | Facility: CLINIC | Age: 23
End: 2018-09-15

## 2018-09-15 NOTE — TELEPHONE ENCOUNTER
Deanna, 24 yo, , 37w5d pt called with two concerns. First was some bright red blood in her stool this AM. None since. I told her this was likely from a hemorrhoid. Deanna confirmed that she DID have hemorrhoids earlier in the pregnancy. Deanna's 2nd concern was that today she's been having some low back pain. Suggested to her that this could be early labor, but we wouldn't have her come in until pains became stronger. Then she went on to say that she'd be needing a c/s because baby is in breech position. In that case, I told her, she should monitor these pains more closely. Since she was unable to describe how frequent or intense they were, I instructed her to drink three large glasses of water, then lay on her left side for the next 1 hr, documenting each time she felt this pain. Pt will call back after that 1 hr to update us so we can further evaluate.

## 2018-09-19 ENCOUNTER — PRENATAL OFFICE VISIT (OUTPATIENT)
Dept: FAMILY MEDICINE | Facility: OTHER | Age: 23
End: 2018-09-19
Payer: COMMERCIAL

## 2018-09-19 VITALS
BODY MASS INDEX: 30.13 KG/M2 | RESPIRATION RATE: 16 BRPM | HEART RATE: 92 BPM | DIASTOLIC BLOOD PRESSURE: 78 MMHG | TEMPERATURE: 97 F | SYSTOLIC BLOOD PRESSURE: 104 MMHG | WEIGHT: 167.4 LBS

## 2018-09-19 DIAGNOSIS — O26.899 RH NEGATIVE STATE IN ANTEPARTUM PERIOD, UNSPECIFIED TRIMESTER: ICD-10-CM

## 2018-09-19 DIAGNOSIS — Z34.80 ENCOUNTER FOR SUPERVISION OF OTHER NORMAL PREGNANCY, UNSPECIFIED TRIMESTER: ICD-10-CM

## 2018-09-19 DIAGNOSIS — Z01.818 PREOP GENERAL PHYSICAL EXAM: Primary | ICD-10-CM

## 2018-09-19 DIAGNOSIS — K21.9 GASTROESOPHAGEAL REFLUX DISEASE, ESOPHAGITIS PRESENCE NOT SPECIFIED: ICD-10-CM

## 2018-09-19 DIAGNOSIS — Z67.91 RH NEGATIVE STATE IN ANTEPARTUM PERIOD, UNSPECIFIED TRIMESTER: ICD-10-CM

## 2018-09-19 DIAGNOSIS — E73.8 ACQUIRED LACTOSE INTOLERANCE: ICD-10-CM

## 2018-09-19 DIAGNOSIS — D50.9 IRON DEFICIENCY ANEMIA, UNSPECIFIED IRON DEFICIENCY ANEMIA TYPE: ICD-10-CM

## 2018-09-19 PROCEDURE — 99207 ZZC COMPLICATED OB VISIT: CPT | Performed by: FAMILY MEDICINE

## 2018-09-19 ASSESSMENT — PAIN SCALES - GENERAL: PAINLEVEL: NO PAIN (0)

## 2018-09-19 NOTE — MR AVS SNAPSHOT
After Visit Summary   9/19/2018    Deanna Leahy    MRN: [id number]           Patient Information     Date Of Birth          1995        Visit Information        Provider Department      9/19/2018 8:00 AM Romulo Basilio MD [facility] [place]        Today's Diagnoses     Preop general physical exam    -  1    Breech presentation, single or unspecified fetus        Encounter for supervision of other normal pregnancy, unspecified trimester        Rh negative state in antepartum period, unspecified trimester        Gastroesophageal reflux disease, esophagitis presence not specified        Iron deficiency anemia, unspecified iron deficiency anemia type        Acquired lactose intolerance          Care Instructions      Before Your Surgery      Call your surgeon if there is any change in your health. This includes signs of a cold or flu (such as a sore throat, runny nose, cough, rash or fever).    Do not smoke, drink alcohol or take over the counter medicine (unless your surgeon or primary care doctor tells you to) for the 24 hours before and after surgery.    If you take prescribed drugs: Follow your doctor s orders about which medicines to take and which to stop until after surgery.    Eating and drinking prior to surgery: follow the instructions from your surgeon    Take a shower or bath the night before surgery. Use the soap your surgeon gave you to gently clean your skin. If you do not have soap from your surgeon, use your regular soap. Do not shave or scrub the surgery site.  Wear clean pajamas and have clean sheets on your bed.     Contact us or return if questions or concerns.           Follow-ups after your visit        Who to contact     If you have questions or need follow up information about today's clinic visit or your schedule please contact [facility] [place] directly at [phone].  Normal or non-critical lab and imaging results will be communicated to  you by MyChart, letter or phone within 4 business days after the clinic has received the results. If you do not hear from us within 7 days, please contact the clinic through [facility] or phone. If you have a critical or abnormal lab result, we will notify you by phone as soon as possible.  Submit refill requests through [facility] or call your pharmacy and they will forward the refill request to us. Please allow 3 business days for your refill to be completed.          Additional Information About Your Visit        [facility] Information     [facility] gives you secure access to your electronic health record. If you see a primary care provider, you can also send messages to your care team and make appointments. If you have questions, please call your primary care clinic.  If you do not have a primary care provider, please call [phone] and they will assist you.        Care EveryWhere ID     This is your Care EveryWhere ID. This could be used by other organizations to access your [facility] medical records  [id number]        Your Vitals Were     Pulse Temperature Respirations Last Period BMI (Body Mass Index)       92 97  F (36.1  C) (Temporal) 16 12/25/2017 (Exact Date) 30.13 kg/m2        Blood Pressure from Last 3 Encounters:   09/19/18 104/78   09/12/18 104/74   09/05/18 110/78    Weight from Last 3 Encounters:   09/19/18 167 lb 6.4 oz (75.9 kg)   09/12/18 163 lb 6.4 oz (74.1 kg)   09/05/18 161 lb 8 oz (73.3 kg)              Today, you had the following     No orders found for display       Primary Care Provider Office Phone # Fax #    Sho De La Torre PA-C [phone] [address] [place] [address]        Equal Access to Services     [facility] : Hadii kam Wood, waaxda luqadaha, qaybta kaalmada adeegyada, jasmyn manjarrez. So [facility] [phone].    ATENCIÓN: Si habla español, tiene a simons disposición servicios gratuitos de asistencia lingüística. Llame al  [phone].    We comply with applicable federal civil rights laws and [place] laws. We do not discriminate on the basis of race, color, national origin, age, disability, sex, sexual orientation, or gender identity.            Thank you!     Thank you for choosing [facility]  for your care. Our goal is always to provide you with excellent care. Hearing back from our patients is one way we can continue to improve our services. Please take a few minutes to complete the written survey that you may receive in the mail after your visit with us. Thank you!             Your Updated Medication List - Protect others around you: Learn how to safely use, store and throw away your medicines at www.disposemymeds.org.          This list is accurate as of 9/19/18  8:22 AM.  Always use your most recent med list.                   Brand Name Dispense Instructions for use Diagnosis    order for DME     1 Device    Equipment being ordered:Breast pump    Breast feeding status of mother       prenatal multivitamin plus iron 27-0.8 MG Tabs per tablet     100 tablet    Take 1 tablet by mouth daily    Pregnancy examination or test, pregnancy unconfirmed

## 2018-09-19 NOTE — PATIENT INSTRUCTIONS
Before Your Surgery      Call your surgeon if there is any change in your health. This includes signs of a cold or flu (such as a sore throat, runny nose, cough, rash or fever).    Do not smoke, drink alcohol or take over the counter medicine (unless your surgeon or primary care doctor tells you to) for the 24 hours before and after surgery.    If you take prescribed drugs: Follow your doctor s orders about which medicines to take and which to stop until after surgery.    Eating and drinking prior to surgery: follow the instructions from your surgeon    Take a shower or bath the night before surgery. Use the soap your surgeon gave you to gently clean your skin. If you do not have soap from your surgeon, use your regular soap. Do not shave or scrub the surgery site.  Wear clean pajamas and have clean sheets on your bed.     Contact us or return if questions or concerns.

## 2018-09-20 ENCOUNTER — MYC MEDICAL ADVICE (OUTPATIENT)
Dept: FAMILY MEDICINE | Facility: OTHER | Age: 23
End: 2018-09-20

## 2018-09-20 NOTE — TELEPHONE ENCOUNTER
Spoke with patient informed her that  is scheduled for 2pm on , and they would like her to come in on 2018 to get her labs done sometime before noon, she can check in through the Emergency Department. Also Dr Cardenas's nurse will be giving her a call to go over all of this on Friday.   Patient understands and has no further questions   Closing encounter  Krista Walker RT (R)

## 2018-09-23 ENCOUNTER — ANESTHESIA EVENT (OUTPATIENT)
Dept: OBGYN | Facility: CLINIC | Age: 23
End: 2018-09-23
Payer: COMMERCIAL

## 2018-09-23 DIAGNOSIS — O09.93 SUPERVISION OF HIGH RISK PREGNANCY IN THIRD TRIMESTER: ICD-10-CM

## 2018-09-23 LAB
ABO + RH BLD: ABNORMAL
ABO + RH BLD: ABNORMAL
BLD GP AB INVEST PLASRBC-IMP: ABNORMAL
BLD GP AB SCN SERPL QL: ABNORMAL
BLOOD BANK CMNT PATIENT-IMP: ABNORMAL
HGB BLD-MCNC: 13 G/DL (ref 11.7–15.7)
SPECIMEN EXP DATE BLD: ABNORMAL

## 2018-09-23 PROCEDURE — 86870 RBC ANTIBODY IDENTIFICATION: CPT | Performed by: FAMILY MEDICINE

## 2018-09-23 PROCEDURE — 36415 COLL VENOUS BLD VENIPUNCTURE: CPT | Performed by: FAMILY MEDICINE

## 2018-09-23 PROCEDURE — 86900 BLOOD TYPING SEROLOGIC ABO: CPT | Performed by: FAMILY MEDICINE

## 2018-09-23 PROCEDURE — 86850 RBC ANTIBODY SCREEN: CPT | Performed by: FAMILY MEDICINE

## 2018-09-23 PROCEDURE — 00000218 ZZHCL STATISTIC OBHBG - HEMOGLOBIN: Performed by: FAMILY MEDICINE

## 2018-09-23 PROCEDURE — 86901 BLOOD TYPING SEROLOGIC RH(D): CPT | Performed by: FAMILY MEDICINE

## 2018-09-23 PROCEDURE — 86780 TREPONEMA PALLIDUM: CPT | Performed by: FAMILY MEDICINE

## 2018-09-23 ASSESSMENT — ENCOUNTER SYMPTOMS: SEIZURES: 0

## 2018-09-23 ASSESSMENT — LIFESTYLE VARIABLES: TOBACCO_USE: 0

## 2018-09-24 ENCOUNTER — ANESTHESIA (OUTPATIENT)
Dept: OBGYN | Facility: CLINIC | Age: 23
End: 2018-09-24
Payer: COMMERCIAL

## 2018-09-24 ENCOUNTER — HOSPITAL ENCOUNTER (INPATIENT)
Facility: CLINIC | Age: 23
LOS: 3 days | Discharge: HOME | End: 2018-09-27
Attending: FAMILY MEDICINE | Admitting: FAMILY MEDICINE
Payer: COMMERCIAL

## 2018-09-24 ENCOUNTER — SURGERY (OUTPATIENT)
Age: 23
End: 2018-09-24

## 2018-09-24 DIAGNOSIS — Z98.891 S/P CESAREAN SECTION: Primary | ICD-10-CM

## 2018-09-24 PROCEDURE — 36000058 ZZH SURGERY LEVEL 3 EA 15 ADDTL MIN: Performed by: FAMILY MEDICINE

## 2018-09-24 PROCEDURE — 59510 CESAREAN DELIVERY: CPT | Performed by: FAMILY MEDICINE

## 2018-09-24 PROCEDURE — 37000009 ZZH ANESTHESIA TECHNICAL FEE, EACH ADDTL 15 MIN: Performed by: FAMILY MEDICINE

## 2018-09-24 PROCEDURE — 25000125 ZZHC RX 250: Performed by: FAMILY MEDICINE

## 2018-09-24 PROCEDURE — 25000125 ZZHC RX 250: Performed by: NURSE ANESTHETIST, CERTIFIED REGISTERED

## 2018-09-24 PROCEDURE — 25000132 ZZH RX MED GY IP 250 OP 250 PS 637: Performed by: FAMILY MEDICINE

## 2018-09-24 PROCEDURE — 27210794 ZZH OR GENERAL SUPPLY STERILE: Performed by: FAMILY MEDICINE

## 2018-09-24 PROCEDURE — 59514 CESAREAN DELIVERY ONLY: CPT | Mod: 80 | Performed by: FAMILY MEDICINE

## 2018-09-24 PROCEDURE — 71000014 ZZH RECOVERY PHASE 1 LEVEL 2 FIRST HR: Performed by: FAMILY MEDICINE

## 2018-09-24 PROCEDURE — 37000008 ZZH ANESTHESIA TECHNICAL FEE, 1ST 30 MIN: Performed by: FAMILY MEDICINE

## 2018-09-24 PROCEDURE — 25000128 H RX IP 250 OP 636: Performed by: NURSE ANESTHETIST, CERTIFIED REGISTERED

## 2018-09-24 PROCEDURE — 36000056 ZZH SURGERY LEVEL 3 1ST 30 MIN: Performed by: FAMILY MEDICINE

## 2018-09-24 PROCEDURE — 25000125 ZZHC RX 250

## 2018-09-24 PROCEDURE — 27110038 ZZH RX 271: Performed by: FAMILY MEDICINE

## 2018-09-24 PROCEDURE — 25000128 H RX IP 250 OP 636: Performed by: FAMILY MEDICINE

## 2018-09-24 PROCEDURE — 12000031 ZZH R&B OB CRITICAL

## 2018-09-24 PROCEDURE — 27110028 ZZH OR GENERAL SUPPLY NON-STERILE: Performed by: FAMILY MEDICINE

## 2018-09-24 RX ORDER — FERROUS SULFATE 325(65) MG
325 TABLET ORAL DAILY
Status: DISCONTINUED | OUTPATIENT
Start: 2018-09-24 | End: 2018-09-27 | Stop reason: HOSPADM

## 2018-09-24 RX ORDER — CITRIC ACID/SODIUM CITRATE 334-500MG
30 SOLUTION, ORAL ORAL
Status: DISCONTINUED | OUTPATIENT
Start: 2018-09-24 | End: 2018-09-24

## 2018-09-24 RX ORDER — OXYTOCIN/0.9 % SODIUM CHLORIDE 30/500 ML
PLASTIC BAG, INJECTION (ML) INTRAVENOUS CONTINUOUS PRN
Status: DISCONTINUED | OUTPATIENT
Start: 2018-09-24 | End: 2018-09-24

## 2018-09-24 RX ORDER — DEXTROSE, SODIUM CHLORIDE, SODIUM LACTATE, POTASSIUM CHLORIDE, AND CALCIUM CHLORIDE 5; .6; .31; .03; .02 G/100ML; G/100ML; G/100ML; G/100ML; G/100ML
INJECTION, SOLUTION INTRAVENOUS CONTINUOUS
Status: DISCONTINUED | OUTPATIENT
Start: 2018-09-24 | End: 2018-09-27 | Stop reason: HOSPADM

## 2018-09-24 RX ORDER — OXYTOCIN/0.9 % SODIUM CHLORIDE 30/500 ML
340 PLASTIC BAG, INJECTION (ML) INTRAVENOUS CONTINUOUS PRN
Status: DISCONTINUED | OUTPATIENT
Start: 2018-09-24 | End: 2018-09-27 | Stop reason: HOSPADM

## 2018-09-24 RX ORDER — ONDANSETRON 2 MG/ML
4 INJECTION INTRAMUSCULAR; INTRAVENOUS EVERY 4 HOURS PRN
Status: DISCONTINUED | OUTPATIENT
Start: 2018-09-24 | End: 2018-09-24

## 2018-09-24 RX ORDER — CEFAZOLIN SODIUM 2 G/100ML
2 INJECTION, SOLUTION INTRAVENOUS
Status: COMPLETED | OUTPATIENT
Start: 2018-09-24 | End: 2018-09-24

## 2018-09-24 RX ORDER — OXYCODONE HYDROCHLORIDE 5 MG/1
5-10 TABLET ORAL
Status: DISCONTINUED | OUTPATIENT
Start: 2018-09-24 | End: 2018-09-27 | Stop reason: HOSPADM

## 2018-09-24 RX ORDER — SIMETHICONE 80 MG
80 TABLET,CHEWABLE ORAL 4 TIMES DAILY PRN
Status: DISCONTINUED | OUTPATIENT
Start: 2018-09-24 | End: 2018-09-27 | Stop reason: HOSPADM

## 2018-09-24 RX ORDER — NALOXONE HYDROCHLORIDE 0.4 MG/ML
.1-.4 INJECTION, SOLUTION INTRAMUSCULAR; INTRAVENOUS; SUBCUTANEOUS
Status: DISCONTINUED | OUTPATIENT
Start: 2018-09-24 | End: 2018-09-24

## 2018-09-24 RX ORDER — KETOROLAC TROMETHAMINE 30 MG/ML
INJECTION, SOLUTION INTRAMUSCULAR; INTRAVENOUS PRN
Status: DISCONTINUED | OUTPATIENT
Start: 2018-09-24 | End: 2018-09-24

## 2018-09-24 RX ORDER — LANOLIN 100 %
OINTMENT (GRAM) TOPICAL
Status: DISCONTINUED | OUTPATIENT
Start: 2018-09-24 | End: 2018-09-27 | Stop reason: HOSPADM

## 2018-09-24 RX ORDER — ONDANSETRON 2 MG/ML
4 INJECTION INTRAMUSCULAR; INTRAVENOUS EVERY 30 MIN PRN
Status: DISCONTINUED | OUTPATIENT
Start: 2018-09-24 | End: 2018-09-24

## 2018-09-24 RX ORDER — NALBUPHINE HYDROCHLORIDE 10 MG/ML
2.5-5 INJECTION, SOLUTION INTRAMUSCULAR; INTRAVENOUS; SUBCUTANEOUS EVERY 6 HOURS PRN
Status: DISCONTINUED | OUTPATIENT
Start: 2018-09-24 | End: 2018-09-24

## 2018-09-24 RX ORDER — OXYTOCIN 10 [USP'U]/ML
INJECTION, SOLUTION INTRAMUSCULAR; INTRAVENOUS PRN
Status: DISCONTINUED | OUTPATIENT
Start: 2018-09-24 | End: 2018-09-24

## 2018-09-24 RX ORDER — FENTANYL CITRATE 50 UG/ML
25-50 INJECTION, SOLUTION INTRAMUSCULAR; INTRAVENOUS
Status: DISCONTINUED | OUTPATIENT
Start: 2018-09-24 | End: 2018-09-25

## 2018-09-24 RX ORDER — OXYTOCIN 10 [USP'U]/ML
10 INJECTION, SOLUTION INTRAMUSCULAR; INTRAVENOUS
Status: DISCONTINUED | OUTPATIENT
Start: 2018-09-24 | End: 2018-09-27 | Stop reason: HOSPADM

## 2018-09-24 RX ORDER — MORPHINE SULFATE 1 MG/ML
INJECTION, SOLUTION EPIDURAL; INTRATHECAL; INTRAVENOUS PRN
Status: DISCONTINUED | OUTPATIENT
Start: 2018-09-24 | End: 2018-09-24

## 2018-09-24 RX ORDER — ONDANSETRON 2 MG/ML
INJECTION INTRAMUSCULAR; INTRAVENOUS PRN
Status: DISCONTINUED | OUTPATIENT
Start: 2018-09-24 | End: 2018-09-24

## 2018-09-24 RX ORDER — KETOROLAC TROMETHAMINE 30 MG/ML
30 INJECTION, SOLUTION INTRAMUSCULAR; INTRAVENOUS EVERY 6 HOURS
Status: DISCONTINUED | OUTPATIENT
Start: 2018-09-24 | End: 2018-09-25 | Stop reason: CLARIF

## 2018-09-24 RX ORDER — AMOXICILLIN 250 MG
2 CAPSULE ORAL 2 TIMES DAILY PRN
Status: DISCONTINUED | OUTPATIENT
Start: 2018-09-24 | End: 2018-09-27 | Stop reason: HOSPADM

## 2018-09-24 RX ORDER — AMOXICILLIN 250 MG
1 CAPSULE ORAL 2 TIMES DAILY PRN
Status: DISCONTINUED | OUTPATIENT
Start: 2018-09-24 | End: 2018-09-27 | Stop reason: HOSPADM

## 2018-09-24 RX ORDER — ACETAMINOPHEN 325 MG/1
650 TABLET ORAL EVERY 6 HOURS
Status: DISCONTINUED | OUTPATIENT
Start: 2018-09-24 | End: 2018-09-27 | Stop reason: HOSPADM

## 2018-09-24 RX ORDER — BUPIVACAINE HYDROCHLORIDE 7.5 MG/ML
INJECTION, SOLUTION INTRASPINAL PRN
Status: DISCONTINUED | OUTPATIENT
Start: 2018-09-24 | End: 2018-09-24

## 2018-09-24 RX ORDER — BISACODYL 10 MG
10 SUPPOSITORY, RECTAL RECTAL DAILY PRN
Status: DISCONTINUED | OUTPATIENT
Start: 2018-09-26 | End: 2018-09-27 | Stop reason: HOSPADM

## 2018-09-24 RX ORDER — HYDROCORTISONE 2.5 %
CREAM (GRAM) TOPICAL 3 TIMES DAILY PRN
Status: DISCONTINUED | OUTPATIENT
Start: 2018-09-24 | End: 2018-09-27 | Stop reason: HOSPADM

## 2018-09-24 RX ORDER — OXYTOCIN/0.9 % SODIUM CHLORIDE 30/500 ML
100 PLASTIC BAG, INJECTION (ML) INTRAVENOUS CONTINUOUS
Status: DISCONTINUED | OUTPATIENT
Start: 2018-09-24 | End: 2018-09-25 | Stop reason: CLARIF

## 2018-09-24 RX ORDER — BUPIVACAINE HYDROCHLORIDE 2.5 MG/ML
INJECTION, SOLUTION INFILTRATION; PERINEURAL PRN
Status: DISCONTINUED | OUTPATIENT
Start: 2018-09-24 | End: 2018-09-27 | Stop reason: HOSPADM

## 2018-09-24 RX ORDER — SODIUM CHLORIDE, SODIUM LACTATE, POTASSIUM CHLORIDE, CALCIUM CHLORIDE 600; 310; 30; 20 MG/100ML; MG/100ML; MG/100ML; MG/100ML
INJECTION, SOLUTION INTRAVENOUS CONTINUOUS
Status: DISCONTINUED | OUTPATIENT
Start: 2018-09-24 | End: 2018-09-25

## 2018-09-24 RX ORDER — MEPERIDINE HYDROCHLORIDE 25 MG/ML
12.5 INJECTION INTRAMUSCULAR; INTRAVENOUS; SUBCUTANEOUS EVERY 5 MIN PRN
Status: DISCONTINUED | OUTPATIENT
Start: 2018-09-24 | End: 2018-09-25

## 2018-09-24 RX ORDER — OXYTOCIN 10 [USP'U]/ML
INJECTION, SOLUTION INTRAMUSCULAR; INTRAVENOUS
Status: DISPENSED
Start: 2018-09-24 | End: 2018-09-25

## 2018-09-24 RX ORDER — SODIUM CHLORIDE, SODIUM LACTATE, POTASSIUM CHLORIDE, CALCIUM CHLORIDE 600; 310; 30; 20 MG/100ML; MG/100ML; MG/100ML; MG/100ML
INJECTION, SOLUTION INTRAVENOUS CONTINUOUS
Status: DISCONTINUED | OUTPATIENT
Start: 2018-09-24 | End: 2018-09-24

## 2018-09-24 RX ORDER — IBUPROFEN 800 MG/1
800 TABLET, FILM COATED ORAL EVERY 6 HOURS
Status: DISCONTINUED | OUTPATIENT
Start: 2018-09-24 | End: 2018-09-27 | Stop reason: HOSPADM

## 2018-09-24 RX ORDER — ONDANSETRON 4 MG/1
4 TABLET, ORALLY DISINTEGRATING ORAL EVERY 30 MIN PRN
Status: DISCONTINUED | OUTPATIENT
Start: 2018-09-24 | End: 2018-09-24

## 2018-09-24 RX ORDER — OXYTOCIN/0.9 % SODIUM CHLORIDE 30/500 ML
PLASTIC BAG, INJECTION (ML) INTRAVENOUS
Status: COMPLETED
Start: 2018-09-24 | End: 2018-09-24

## 2018-09-24 RX ORDER — ONDANSETRON 2 MG/ML
4 INJECTION INTRAMUSCULAR; INTRAVENOUS EVERY 6 HOURS PRN
Status: DISCONTINUED | OUTPATIENT
Start: 2018-09-24 | End: 2018-09-27 | Stop reason: HOSPADM

## 2018-09-24 RX ORDER — NALOXONE HYDROCHLORIDE 0.4 MG/ML
.1-.4 INJECTION, SOLUTION INTRAMUSCULAR; INTRAVENOUS; SUBCUTANEOUS
Status: DISCONTINUED | OUTPATIENT
Start: 2018-09-24 | End: 2018-09-25

## 2018-09-24 RX ORDER — LIDOCAINE 40 MG/G
CREAM TOPICAL
Status: DISCONTINUED | OUTPATIENT
Start: 2018-09-24 | End: 2018-09-27 | Stop reason: HOSPADM

## 2018-09-24 RX ADMIN — BUPIVACAINE HYDROCHLORIDE 10 ML: 2.5 INJECTION, SOLUTION EPIDURAL; INFILTRATION; INTRACAUDAL; PERINEURAL at 16:31

## 2018-09-24 RX ADMIN — BUPIVACAINE HYDROCHLORIDE IN DEXTROSE 1.6 ML: 7.5 INJECTION, SOLUTION SUBARACHNOID at 15:24

## 2018-09-24 RX ADMIN — Medication: at 16:18

## 2018-09-24 RX ADMIN — ACETAMINOPHEN 650 MG: 325 TABLET ORAL at 19:18

## 2018-09-24 RX ADMIN — Medication 100 ML/HR: at 18:03

## 2018-09-24 RX ADMIN — FERROUS SULFATE TAB 325 MG (65 MG ELEMENTAL FE) 325 MG: 325 (65 FE) TAB at 19:19

## 2018-09-24 RX ADMIN — PHENYLEPHRINE HYDROCHLORIDE 100 MCG: 10 INJECTION, SOLUTION INTRAMUSCULAR; INTRAVENOUS; SUBCUTANEOUS at 15:37

## 2018-09-24 RX ADMIN — ONDANSETRON 4 MG: 2 INJECTION INTRAMUSCULAR; INTRAVENOUS at 15:29

## 2018-09-24 RX ADMIN — CEFAZOLIN SODIUM 2 G: 2 INJECTION, SOLUTION INTRAVENOUS at 15:01

## 2018-09-24 RX ADMIN — OXYTOCIN 10 UNITS: 10 INJECTION, SOLUTION INTRAMUSCULAR; INTRAVENOUS at 15:46

## 2018-09-24 RX ADMIN — SODIUM CHLORIDE, POTASSIUM CHLORIDE, SODIUM LACTATE AND CALCIUM CHLORIDE: 600; 310; 30; 20 INJECTION, SOLUTION INTRAVENOUS at 13:24

## 2018-09-24 RX ADMIN — KETOROLAC TROMETHAMINE 30 MG: 30 INJECTION, SOLUTION INTRAMUSCULAR at 16:01

## 2018-09-24 RX ADMIN — SODIUM CHLORIDE, POTASSIUM CHLORIDE, SODIUM LACTATE AND CALCIUM CHLORIDE 1000 ML: 600; 310; 30; 20 INJECTION, SOLUTION INTRAVENOUS at 13:00

## 2018-09-24 RX ADMIN — SODIUM CHLORIDE, POTASSIUM CHLORIDE, SODIUM LACTATE AND CALCIUM CHLORIDE: 600; 310; 30; 20 INJECTION, SOLUTION INTRAVENOUS at 16:08

## 2018-09-24 RX ADMIN — KETOROLAC TROMETHAMINE 30 MG: 30 INJECTION, SOLUTION INTRAMUSCULAR at 22:01

## 2018-09-24 RX ADMIN — OXYTOCIN-SODIUM CHLORIDE 0.9% IV SOLN 30 UNIT/500ML 550 ML/HR: 30-0.9/5 SOLUTION at 15:45

## 2018-09-24 RX ADMIN — PHENYLEPHRINE HYDROCHLORIDE 200 MCG: 10 INJECTION, SOLUTION INTRAMUSCULAR; INTRAVENOUS; SUBCUTANEOUS at 15:25

## 2018-09-24 RX ADMIN — MORPHINE SULFATE 0.2 MG: 5 INJECTION, SOLUTION INTRAVENOUS at 15:24

## 2018-09-24 RX ADMIN — OXYTOCIN-SODIUM CHLORIDE 0.9% IV SOLN 30 UNIT/500ML 100 ML/HR: 30-0.9/5 SOLUTION at 18:03

## 2018-09-24 RX ADMIN — SODIUM CHLORIDE, POTASSIUM CHLORIDE, SODIUM LACTATE AND CALCIUM CHLORIDE: 600; 310; 30; 20 INJECTION, SOLUTION INTRAVENOUS at 14:02

## 2018-09-24 ASSESSMENT — ACTIVITIES OF DAILY LIVING (ADL)
DRESS: 0-->INDEPENDENT
COGNITION: 0 - NO COGNITION ISSUES REPORTED
TRANSFERRING: 0-->INDEPENDENT
SWALLOWING: 0-->SWALLOWS FOODS/LIQUIDS WITHOUT DIFFICULTY
AMBULATION: 0-->INDEPENDENT
FALL_HISTORY_WITHIN_LAST_SIX_MONTHS: NO
BATHING: 0-->INDEPENDENT
TOILETING: 0-->INDEPENDENT
RETIRED_EATING: 0-->INDEPENDENT
RETIRED_COMMUNICATION: 0-->UNDERSTANDS/COMMUNICATES WITHOUT DIFFICULTY

## 2018-09-24 NOTE — L&D DELIVERY NOTE
PREOPERATIVE DIAGNOSES:   1. Term intrauterine pregnancy at 39 weeks' gestation.   2. Scheduled Primary LTCS due to a persistent breech presentation     POSTOPERATIVE DIAGNOSIS:   1. Term intrauterine pregnancy at 39 weeks' gestation.   2. Scheduled Primary LTCS due to a persistent breech presentation    PROCEDURE: Primary low transverse  section.     SURGEON: Theresa   PRIMARY: Praful  ASSISTANT: Praful Basilio was asked to assist in the  Section because of the special skill set he/she possess in Myotomy repair, fascial closure, and special skin closure techniques.  Dr. Basilio was a vital assistant in all of the above listed procedures. His/Her skills allowed us to shorten the length of the procedure considerably, which has been shown to decrease risk of infection, decrease post operative morbidity, and improve outcomes.     ANESTHESIA: Spinal with Elkin Pena.     INDICATIONS: Primiparous patient who is at 39 0/7 weeks' gestation. Please see my consult or EPIC note for details. This patient had a persistent breech presentation at 39 weeks' gestation so was scheduled for a primary low transverse  section as the mode of delivery.  She had declined an attempt at an external cephalic version.     PROCEDURE: Deanna was taken to the operating room where spinal anesthesia was placed. She was then laid in the supine position where she was prepped and draped in the routine sterile fashion after a Saavedra catheter was placed. Under adequate anesthesia, a Pfannenstiel incision was made with a #15 blade. The incision was extended down through the subcutaneous tissue with blunt and sharp dissection, cauterizing any bleeding as we went. The midline of the fascia was scored with a #15 blade and carried transversely in both directions with a curved Darden scissors. The fascia was then dissected off the anterior surface of the rectus muscle inferiorly and superiorly with both sharp and blunt dissection.  Again, any bleeding was cauterized and taken care of at the time. The rectus muscles were then  in the midline and entered with sharp dissection. This was then extended with blunt dissection in both directions until adequate space was appreciated. A bladder flap was created by tenting the uterovesicular peritoneum with DeBakeys and incising with a Metzenbaum scissors. With good exposure, we then incised the uterus with a #15 blade, carefully getting down to the final layer. This was then broken through with a Neha and then the uterine incision was extended with finger dissection in both directions transversely. The presenting part was identified; it was then delivered through the abdominal incision atraumatically. The shoulders and remainder of the body were then delivered and spontaneous crying and respirations were noted with this viable . Suctioning through the mouth and nares was done at this time. The cord was clamped x2 and cut and the baby brought over to the warming table and nursing staff by the assistant. Cord bloods were then obtained.     The placenta was then removed manually intact. The cut surface of the uterus was then isolated with Shoemaker clamps. The uterine incision was closed using 0 Vicryl in a running locked fashion, followed by a second imbricating layer of 0 Monocryl. Any bleeding was controlled with figure-of-eight sutures. Hemostasis was excellent.     The bladder flap was not repaired.     Irrigation was then done to remove any blood clots and debris. On reinspection, the uterine scar was dry and clear of any bleeding.     The parietal peritoneum was then closed using 0 Vicryl in a running fashion. The rectus muscles were brought together to the midline with 0 Vicryl interrupted sutures.     The corners of the fascia on either end were isolated with Kocher clamps. I then placed the first On-Q catheter underneath the fascia by taking a lateral approach. The fascia was then  closed over the top of this with 0 Vicryl in a running fashion. The second On-Q catheter was then placed above the fascia in the subq layer and the subq tissue was closed by using   3 interrupted sutures of 2-0 plain catgut suture. The skin was then closed with Insorb resorbable staples and sealed with Dermabond. I also sealed the catheter sites of the On-Q pump with Dermabond and placed a Tegaderm over the top of this. A sterile pressure dressing was then placed over the abdominal incision.     VARIATIONS: None .     ESTIMATED BLOOD LOSS: 900 mL.     TOTAL FLUIDS IN: 1100 ml of LR.     TOTAL URINE OUTPUT: About 200 mL of clear urine.     Both mom and this female  with apgars of 9 and 10 at 1 and 5 min respectively, which weighed 8 pounds 0 ounces or 3630 grams were stable upon me leaving the delivery room.     OPERATING ROOM TIMES:   Into the operating room at 1513, time of incision 1539, time of delivery 1544, and time of closure 1618.     Electronically signed by:  Douglas Cardenas M.D.  2018

## 2018-09-24 NOTE — IP AVS SNAPSHOT
[facility]    [address]     [place] [address]    Phone:  [phone]                                       After Visit Summary   9/24/2018    Deanna Leahy    MRN: [id number]           After Visit Summary Signature Page     I have received my discharge instructions, and my questions have been answered. I have discussed any challenges I see with this plan with the nurse or doctor.    ..........................................................................................................................................  Patient/Patient Representative Signature      ..........................................................................................................................................  Patient Representative Print Name and Relationship to Patient    ..................................................               ................................................  Date                                   Time    ..........................................................................................................................................  Reviewed by Signature/Title    ...................................................              ..............................................  Date                                               Time          22EPIC Rev 08/18

## 2018-09-24 NOTE — PROGRESS NOTES
To OR with client in wheelchair at 1513 accompanied by CRNA and OR nurse. Care transferred to Jud CARMONA RN.

## 2018-09-24 NOTE — PROGRESS NOTES
OR nurse contacted and she indicated it would be at least another 30 minute delay for this delivery. Dr. Cardenas, Dr. Basilio and client/ aware and family is quite understanding.

## 2018-09-24 NOTE — OR NURSING
Transfer from  PACU to Room 359.  Transferred to bed via: on bed from OR.    S: 24 y/o female  S/P  Section       Anesthesia Type:  Spinal       Surgeon:  Dr. Cardenas       Allergies:  See Medication Reconciliation Record       DNR: No    B:  Pertinent Medical History:   Past Medical History:   Diagnosis Date     NO ACTIVE PROBLEMS      Supervision of high-risk pregnancy 2018              Surgical History:    Past Surgical History:   Procedure Laterality Date     HC TOOTH EXTRACTION W/FORCEP         A:  EBL: 900 mL        IVF:  100 mL        UOP:  100 mL        NPO:  ___Yes _x__No         Vomiting:  ___Yes _x__No         Drainage: None        Skin Integrity: Normal        RFO: _x__Yes___No Saavedra catheter, On-Q pump        SSI Patient?  __x_Yes___No        Brace/sling/equipment:  _x__Yes___No On-Q pump         See PACU record for ongoing assessment, vital signs and pain assessment.    R: Post-Op vitals and assessments as ordered/indicated per patient's condition.       Follow Post-Op orders and notify Physician prn.       Continue to involve patient/family in plan of care and discharge planning.       Reinforce Pre-Operative education.       Implement skin safety interventions as appropriate.    Name: Rocio Youngblood

## 2018-09-24 NOTE — ANESTHESIA CARE TRANSFER NOTE
Patient: Deanna Leahy    Procedure(s):  multi select  Delivery - Wound Class: II-Clean Contaminated    Diagnosis: Persistent Breech   Diagnosis Additional Information: No value filed.    Anesthesia Type:   Spinal     Note:  Airway :Room Air  Patient transferred to:Phase II  Handoff Report: Identifed the Patient, Identified the Reponsible Provider, Reviewed the pertinent medical history, Discussed the surgical course, Reviewed Intra-OP anesthesia mangement and issues during anesthesia, Set expectations for post-procedure period and Allowed opportunity for questions and acknowledgement of understanding      Vitals: (Last set prior to Anesthesia Care Transfer)    CRNA VITALS  2018 1558 - 2018 1640      2018             SpO2: 99 %                Electronically Signed By: JERICHO Vernon CRNA  2018  4:40 PM

## 2018-09-24 NOTE — ANESTHESIA PREPROCEDURE EVALUATION
Anesthesia Evaluation     . Pt has had prior anesthetic. Type: MAC    No history of anesthetic complications          ROS/MED HX    ENT/Pulmonary:  - neg pulmonary ROS    (-) tobacco use and asthma   Neurologic:  - neg neurologic ROS    (-) seizures and migraines   Cardiovascular:  - neg cardiovascular ROS   (+) ----. : . . . :. . No previous cardiac testing       METS/Exercise Tolerance:     Hematologic:  - neg hematologic  ROS       Musculoskeletal:  - neg musculoskeletal ROS       GI/Hepatic:     (+) GERD Asymptomatic on medication,       Renal/Genitourinary:  - ROS Renal section negative       Endo:  - neg endo ROS       Psychiatric:  - neg psychiatric ROS       Infectious Disease:  - neg infectious disease ROS       Malignancy:      - no malignancy   Other:    - neg other ROS                 Physical Exam  Normal systems: cardiovascular, pulmonary and dental    Airway   Mallampati: I  TM distance: <3 FB  Neck ROM: full    Dental     Cardiovascular   Rhythm and rate: regular and normal      Pulmonary    breath sounds clear to auscultation                    Anesthesia Plan      History & Physical Review  History and physical reviewed and following examination; no interval change.    ASA Status:  2 .    NPO Status:  > 6 hours    Plan for Spinal   PONV prophylaxis:  Ondansetron (or other 5HT-3) and Dexamethasone or Solumedrol       Postoperative Care  Postoperative pain management:  IV analgesics.      Consents  Anesthetic plan, risks, benefits and alternatives discussed with:  Patient.  Use of blood products discussed: No .   .                          .

## 2018-09-24 NOTE — PROGRESS NOTES
Surgery on hold as surgery has add on procedures. Client/, Dr. Cardenas and Dr. Basilio aware. Anticipating at least a 30-45 min delay. IV antibiotic at bedside but not infused yet.

## 2018-09-24 NOTE — IP AVS SNAPSHOT
MRN:[id number]                      After Visit Summary   2018    Deanna Leahy    MRN: [id number]           Thank you!     Thank you for choosing [facility] for your care. Our goal is always to provide you with excellent care. Hearing back from our patients is one way we can continue to improve our services. Please take a few minutes to complete the written survey that you may receive in the mail after you visit with us. Thank you!        Patient Information     Date Of Birth          1995        Designated Caregiver       Most Recent Value    Caregiver    Will someone help with your care after discharge? no    Name of designated caregiver none    Phone number of caregiver patient #      About your hospital stay     You were admitted on:  2018 You last received care in the:  [facility]    You were discharged on:  2018        Reason for your hospital stay                         Who to Call     For medical emergencies, please call 911.  For non-urgent questions about your medical care, please call your primary care provider or clinic, [phone]  For questions related to your surgery, please call your surgery clinic        Attending Provider     Provider Douglas Britton MD Family Practice    Romulo Basilio MD Family Practice       Primary Care Provider Office Phone # Fax #    Sho De La Torre PA-C [phone]      After Care Instructions     Activity       Your activity upon discharge: pelvic rest for 4-6 weeks, no lifting heavier than baby (and car seat) for 6 weeks, and no driving while on opioid analgesics            Diet       Follow this diet upon discharge: Regular                  Follow-up Appointments     Follow-up and recommended labs and tests        Follow up with Dr. Basilio or Dr. Cardenas, within 7 days wound check.    Follow up with Dr. Basilio in 6 weeks for postpartum check.                   Further instructions from your care team         Discharge Instructions for  Section ()  You had a  section, or . During the , your baby was delivered through a surgical incision in your stomach and uterus. Full recovery after a  can take time. It s important to take care of yourself -- for your own sake and because your new baby needs you. Here are some guidelines to follow at home.  Incision care  Here's how to take care of your incision:    Shower as needed. Pat your incision dry.    Watch your incision for signs of infection, like increasing redness or drainage.    Hold a pillow against the incision when you laugh or cough and when you get up from a lying or sitting position.    Remember, it can take as long as 6 weeks for a  incision to heal.  Activity  Here are some suggestions:    Don t try to take care of anyone other than your baby and yourself.    Remember, the more active you are, the more likely you are to have an increase in your bleeding.    Get lots of rest. Take naps in the afternoon.    Increase your activities gradually.    Plan your activities so that you don t have to go up or down stairs more than necessary.    Do postsurgical deep breathing and coughing exercises. Ask your healthcare provider for instructions.    Don t lift anything heavier than your baby until your healthcare provider tells you it s OK.    Don t drive until your healthcare provider says it s OK.    Don t have sexual intercourse until after you ve had a follow-up appointment with your healthcare provider and you have decided on a birth control method.  Follow-up  Make a follow-up appointment as directed by our staff.  When to call your healthcare provider  Call your healthcare provider right away if you have any of the following:    Fever of 100.4 F (38 C) or higher    Redness, pain, or drainage at your incision site    Bleeding that requires a new  sanitary pad every hour    Severe pain in the abdomen    Pain or urgency with urination    Foul odor from vaginal discharge    Trouble urinating or emptying your bladder    No bowel movement within 1 week after the birth of your baby    Swollen, red, painful area in the leg    Appearance of rash or hives    Sore, red, painful area on the breasts that may be accompanied by flu-like symptoms    Feelings of anxiety, panic, and/or depression   Date Last Reviewed: 8/16/2015 2000-2017 [employer]. [address] [place] [address]. All rights reserved. This information is not intended as a substitute for professional medical care. Always follow your healthcare professional's instructions.          Pending Results     No orders found from 9/22/2018 to 9/25/2018.            Statement of Approval     Ordered          09/27/18 2347  I have reviewed and agree with all the recommendations and orders detailed in this document.  EFFECTIVE NOW     Approved and electronically signed by:  Romulo Basilio MD             Admission Information     Date & Time Provider Department Dept. Phone    9/24/2018 Romulo Basilio MD [facility] [phone]      Your Vitals Were     Blood Pressure Pulse Temperature Respirations Last Period Pulse Oximetry    108/70 98 97.8  F (36.6  C) (Axillary) 16 12/25/2017 (Exact Date) 97%      MyChart Information     [employer] gives you secure access to your electronic health record. If you see a primary care provider, you can also send messages to your care team and make appointments. If you have questions, please call your primary care clinic.  If you do not have a primary care provider, please call [phone] and they will assist you.        Care EveryWhere ID     This is your Care EveryWhere ID. This could be used by other organizations to access your [facility] medical records  [id number]        Equal Access to Services     CARLOS CONN: Franci humphrey  charlotte Wood, waaxda luqadaha, qaybta kaalmada saritha, jasmyn elliottin hayaajhony olearymelissa chrischichi laamberjhony juan carlos So [facility] [phone].    ATENCIÓN: Si jelanila william, tiene a simons disposición servicios gratuitos de asistencia lingüística. Antonio al [phone].    We comply with applicable federal civil rights laws and [place] laws. We do not discriminate on the basis of race, color, national origin, age, disability, sex, sexual orientation, or gender identity.               Review of your medicines      START taking        Dose / Directions    acetaminophen 325 MG tablet   Commonly known as:  TYLENOL        Dose:  650 mg   Take 2 tablets (650 mg) by mouth every 6 hours   Quantity:  100 tablet   Refills:  0       ibuprofen 800 MG tablet   Commonly known as:  ADVIL/MOTRIN        Dose:  800 mg   Take 1 tablet (800 mg) by mouth every 8 hours as needed for moderate pain   Quantity:  90 tablet   Refills:  0       lanolin ointment   Used for:  Breast feeding status of mother        Apply topically every hour as needed for dry skin (soreness)   Refills:  0       oxyCODONE IR 5 MG tablet   Commonly known as:  ROXICODONE        Dose:  5-10 mg   Take 1-2 tablets (5-10 mg) by mouth every 6 hours as needed for moderate to severe pain   Quantity:  15 tablet   Refills:  0       senna-docusate 8.6-50 MG per tablet   Commonly known as:  SENOKOT-S;PERICOLACE        Dose:  1 tablet   Take 1 tablet by mouth 2 times daily as needed for constipation   Quantity:  30 tablet   Refills:  0         CONTINUE these medicines which have NOT CHANGED        Dose / Directions    IRON SUPPLEMENT PO        Dose:  325 mg   Take 325 mg by mouth daily   Refills:  0       order for DME   Used for:  Breast feeding status of mother        Equipment being ordered:Breast pump   Quantity:  1 Device   Refills:  0       prenatal multivitamin plus iron 27-0.8 MG Tabs per tablet   Used for:  Pregnancy examination or test, pregnancy unconfirmed        Dose:  1 tablet    Take 1 tablet by mouth daily   Quantity:  100 tablet   Refills:  3            Where to get your medicines      These medications were sent to [facility] [place], [place] - [address]  [address], [facility] [address]     Phone:  [phone]     ibuprofen 800 MG tablet    senna-docusate 8.6-50 MG per tablet         Some of these will need a paper prescription and others can be bought over the counter. Ask your nurse if you have questions.     Bring a paper prescription for each of these medications     oxyCODONE IR 5 MG tablet       You don't need a prescription for these medications     acetaminophen 325 MG tablet    lanolin ointment                Protect others around you: Learn how to safely use, store and throw away your medicines at www.disposemymeds.org.        Information about OPIOIDS     PRESCRIPTION OPIOIDS: WHAT YOU NEED TO KNOW   We gave you an opioid (narcotic) pain medicine. It is important to manage your pain, but opioids are not always the best choice. You should first try all the other options your care team gave you. Take this medicine for as short a time (and as few doses) as possible.    Some activities can increase your pain, such as bandage changes or therapy sessions. It may help to take your pain medicine 30 to 60 minutes before these activities. Reduce your stress by getting enough sleep, working on hobbies you enjoy and practicing relaxation or meditation. Talk to your care team about ways to manage your pain beyond prescription opioids.    These medicines have risks:    DO NOT drive when on new or higher doses of pain medicine. These medicines can affect your alertness and reaction times, and you could be arrested for driving under the influence (DUI). If you need to use opioids long-term, talk to your care team about driving.    DO NOT operate heavy machinery    DO NOT do any other dangerous activities while taking these medicines.    DO NOT drink any alcohol  while taking these medicines.     If the opioid prescribed includes acetaminophen, DO NOT take with any other medicines that contain acetaminophen. Read all labels carefully. Look for the word  acetaminophen  or  Tylenol.  Ask your pharmacist if you have questions or are unsure.    You can get addicted to pain medicines, especially if you have a history of addiction (chemical, alcohol or substance dependence). Talk to your care team about ways to reduce this risk.    All opioids tend to cause constipation. Drink plenty of water and eat foods that have a lot of fiber, such as fruits, vegetables, prune juice, apple juice and high-fiber cereal. Take a laxative (Miralax, milk of magnesia, Colace, Senna) if you don t move your bowels at least every other day. Other side effects include upset stomach, sleepiness, dizziness, throwing up, tolerance (needing more of the medicine to have the same effect), physical dependence and slowed breathing.    Store your pills in a secure place, locked if possible. We will not replace any lost or stolen medicine. If you don t finish your medicine, please throw away (dispose) as directed by your pharmacist. The [place] Pollution Control Agency has more information about safe disposal: https://www.pca.[facility].[place].us/living-green/managing-unwanted-medications             Medication List: This is a list of all your medications and when to take them. Check marks below indicate your daily home schedule. Keep this list as a reference.      Medications           Morning Afternoon Evening Bedtime As Needed    acetaminophen 325 MG tablet   Commonly known as:  TYLENOL   Take 2 tablets (650 mg) by mouth every 6 hours   Last time this was given:  650 mg on 9/27/2018  8:39 AM                                ibuprofen 800 MG tablet   Commonly known as:  ADVIL/MOTRIN   Take 1 tablet (800 mg) by mouth every 8 hours as needed for moderate pain   Last time this was given:  800 mg on 9/27/2018 10:54 AM                                 IRON SUPPLEMENT PO   Take 325 mg by mouth daily   Last time this was given:  325 mg on 9/27/2018  8:40 AM                                lanolin ointment   Apply topically every hour as needed for dry skin (soreness)                                order for DME   Equipment being ordered:Breast pump                                oxyCODONE IR 5 MG tablet   Commonly known as:  ROXICODONE   Take 1-2 tablets (5-10 mg) by mouth every 6 hours as needed for moderate to severe pain   Last time this was given:  5 mg on 9/27/2018 10:54 AM                                prenatal multivitamin plus iron 27-0.8 MG Tabs per tablet   Take 1 tablet by mouth daily                                senna-docusate 8.6-50 MG per tablet   Commonly known as:  SENOKOT-S;PERICOLACE   Take 1 tablet by mouth 2 times daily as needed for constipation   Last time this was given:  1 tablet on 9/26/2018  8:23 PM

## 2018-09-24 NOTE — PROGRESS NOTES
Admission; late entry  Client and  present to [facility] at 1154 for scheduled 39 week C/S secondary to baby girl breech presentation. FHT's 135 with accels no decels; occ irritability but no contractions. Abdomen prepped for delivery, IV placed and second LR liter currently infusing. Plan for ON -Q placement and Dr. Cardenas explained its advantages. Dr. Basilio also on unit to assess client and answer questions. Planning skin to skin in OR, brstfdg in PAR and all baby meds and routine tests. Have encouraged questions and provided answers. Client/  excited for first baby.

## 2018-09-25 LAB — HGB BLD-MCNC: 10.2 G/DL (ref 11.7–15.7)

## 2018-09-25 PROCEDURE — 85018 HEMOGLOBIN: CPT | Performed by: FAMILY MEDICINE

## 2018-09-25 PROCEDURE — 86900 BLOOD TYPING SEROLOGIC ABO: CPT | Performed by: FAMILY MEDICINE

## 2018-09-25 PROCEDURE — 25000128 H RX IP 250 OP 636: Performed by: FAMILY MEDICINE

## 2018-09-25 PROCEDURE — 12000029 ZZH R&B OB INTERMEDIATE

## 2018-09-25 PROCEDURE — 86901 BLOOD TYPING SEROLOGIC RH(D): CPT | Performed by: FAMILY MEDICINE

## 2018-09-25 PROCEDURE — 25000132 ZZH RX MED GY IP 250 OP 250 PS 637: Performed by: FAMILY MEDICINE

## 2018-09-25 PROCEDURE — 85461 HEMOGLOBIN FETAL: CPT | Performed by: FAMILY MEDICINE

## 2018-09-25 PROCEDURE — 36415 COLL VENOUS BLD VENIPUNCTURE: CPT | Performed by: FAMILY MEDICINE

## 2018-09-25 RX ADMIN — OXYCODONE HYDROCHLORIDE 5 MG: 5 TABLET ORAL at 08:18

## 2018-09-25 RX ADMIN — IBUPROFEN 800 MG: 800 TABLET ORAL at 15:48

## 2018-09-25 RX ADMIN — KETOROLAC TROMETHAMINE 30 MG: 30 INJECTION, SOLUTION INTRAMUSCULAR at 09:54

## 2018-09-25 RX ADMIN — FERROUS SULFATE TAB 325 MG (65 MG ELEMENTAL FE) 325 MG: 325 (65 FE) TAB at 08:18

## 2018-09-25 RX ADMIN — OXYCODONE HYDROCHLORIDE 10 MG: 5 TABLET ORAL at 12:38

## 2018-09-25 RX ADMIN — ACETAMINOPHEN 650 MG: 325 TABLET ORAL at 06:57

## 2018-09-25 RX ADMIN — OXYCODONE HYDROCHLORIDE 5 MG: 5 TABLET ORAL at 21:54

## 2018-09-25 RX ADMIN — IBUPROFEN 800 MG: 800 TABLET ORAL at 21:53

## 2018-09-25 RX ADMIN — ACETAMINOPHEN 650 MG: 325 TABLET ORAL at 19:02

## 2018-09-25 RX ADMIN — SENNOSIDES AND DOCUSATE SODIUM 2 TABLET: 8.6; 5 TABLET ORAL at 08:18

## 2018-09-25 RX ADMIN — SENNOSIDES AND DOCUSATE SODIUM 2 TABLET: 8.6; 5 TABLET ORAL at 21:53

## 2018-09-25 RX ADMIN — ACETAMINOPHEN 650 MG: 325 TABLET ORAL at 01:00

## 2018-09-25 RX ADMIN — OXYCODONE HYDROCHLORIDE 5 MG: 5 TABLET ORAL at 15:48

## 2018-09-25 RX ADMIN — HUMAN RHO(D) IMMUNE GLOBULIN 300 MCG: 300 INJECTION, SOLUTION INTRAMUSCULAR at 20:20

## 2018-09-25 RX ADMIN — KETOROLAC TROMETHAMINE 30 MG: 30 INJECTION, SOLUTION INTRAMUSCULAR at 03:57

## 2018-09-25 RX ADMIN — OXYCODONE HYDROCHLORIDE 5 MG: 5 TABLET ORAL at 19:02

## 2018-09-25 NOTE — PLAN OF CARE
Problem: Patient Care Overview  Goal: Plan of Care/Patient Progress Review  Outcome: Improving  S: Shift review  B:Deanna is a , day 1 post  birth.  A: Stable post-op, incisional dressing is dry & intact , Lung sounds-clear, bowel sounds hypoactive in all 4 quadrants, independent with mobility, pain control achieved with p.o. pain meds, drinking liquids without nausea or vomiting. Becoming more confident with breast feeding.  R: Continue with plan of care.

## 2018-09-25 NOTE — PLAN OF CARE
Problem: Postpartum ( Delivery) (Adult,Obstetrics,Pediatric)  Goal: Signs and Symptoms of Listed Potential Problems Will be Absent, Minimized or Managed (Postpartum)  Signs and symptoms of listed potential problems will be absent, minimized or managed by discharge/transition of care (reference Postpartum ( Delivery) (Adult,Obstetrics,Pediatric) CPG).   Outcome: Improving  S-(situation): End of shift note    B-(background): post c/s for breech presentation    A-(assessment): Doing well.  Up and about, voided after [place] d/c    R-(recommendations): Pt sleeping now, mother is here holding baby.  Report to the next nurse.

## 2018-09-26 LAB
ABO + RH BLD: NORMAL
ABO + RH BLD: NORMAL
BLOOD BANK CMNT PATIENT-IMP: NORMAL
DATE RH IMM GL GVN: NORMAL
FETAL CELL SCN BLD QL ROSETTE: NORMAL
RH IG VIALS RECOM PATIENT: NORMAL
T PALLIDUM AB SER QL: NONREACTIVE

## 2018-09-26 PROCEDURE — 25000132 ZZH RX MED GY IP 250 OP 250 PS 637: Performed by: FAMILY MEDICINE

## 2018-09-26 PROCEDURE — 12000027 ZZH R&B OB

## 2018-09-26 RX ADMIN — IBUPROFEN 800 MG: 800 TABLET ORAL at 10:15

## 2018-09-26 RX ADMIN — ACETAMINOPHEN 650 MG: 325 TABLET ORAL at 13:48

## 2018-09-26 RX ADMIN — IBUPROFEN 800 MG: 800 TABLET ORAL at 16:37

## 2018-09-26 RX ADMIN — ACETAMINOPHEN 650 MG: 325 TABLET ORAL at 06:48

## 2018-09-26 RX ADMIN — FERROUS SULFATE TAB 325 MG (65 MG ELEMENTAL FE) 325 MG: 325 (65 FE) TAB at 10:14

## 2018-09-26 RX ADMIN — IBUPROFEN 800 MG: 800 TABLET ORAL at 22:16

## 2018-09-26 RX ADMIN — OXYCODONE HYDROCHLORIDE 5 MG: 5 TABLET ORAL at 01:09

## 2018-09-26 RX ADMIN — SENNOSIDES AND DOCUSATE SODIUM 1 TABLET: 8.6; 5 TABLET ORAL at 20:23

## 2018-09-26 RX ADMIN — OXYCODONE HYDROCHLORIDE 5 MG: 5 TABLET ORAL at 16:37

## 2018-09-26 RX ADMIN — ACETAMINOPHEN 650 MG: 325 TABLET ORAL at 20:15

## 2018-09-26 RX ADMIN — IBUPROFEN 800 MG: 800 TABLET ORAL at 03:49

## 2018-09-26 RX ADMIN — ACETAMINOPHEN 650 MG: 325 TABLET ORAL at 01:09

## 2018-09-26 RX ADMIN — OXYCODONE HYDROCHLORIDE 5 MG: 5 TABLET ORAL at 20:16

## 2018-09-26 RX ADMIN — OXYCODONE HYDROCHLORIDE 5 MG: 5 TABLET ORAL at 03:49

## 2018-09-26 RX ADMIN — SENNOSIDES AND DOCUSATE SODIUM 2 TABLET: 8.6; 5 TABLET ORAL at 10:14

## 2018-09-26 RX ADMIN — OXYCODONE HYDROCHLORIDE 5 MG: 5 TABLET ORAL at 10:14

## 2018-09-26 RX ADMIN — OXYCODONE HYDROCHLORIDE 5 MG: 5 TABLET ORAL at 22:16

## 2018-09-26 RX ADMIN — OXYCODONE HYDROCHLORIDE 5 MG: 5 TABLET ORAL at 13:48

## 2018-09-26 RX ADMIN — OXYCODONE HYDROCHLORIDE 5 MG: 5 TABLET ORAL at 06:48

## 2018-09-26 NOTE — PROGRESS NOTES
[facility]   Obstetrics Post-Op / Progress Note         Assessment and Plan:    Assessment:   Post-operative day #2  Low transverse primary  section  L&D complications: Breech presentation  Intrauterine pregnancy at 39 weeks gestation      Doing well.  Clean wound without signs of infection.  No excessive bleeding  Pain well-controlled.      Plan:   Ambulation encouraged  Pain control measures as needed  Reportable signs and symptoms dicussed with the patient  Uterine massage  Anticipate discharge tomorrow            Interval History:   Doing well.  Pain is well-controlled.  No fevers.  No history of wound drainage, warmth or significant erythema.  Good appetite.  Denies chest pain, shortness of breath, nausea or vomiting.  Ambulatory.  Breastfeeding well.          Significant Problems:    None          Review of Systems:    The patient denies any chest pain, shortness of breath, excessive pain, fever, chills, purulent drainage from the wound, nausea or vomiting.          Medications:     All medications related to the patient's surgery have been reviewed  Current Facility-Administered Medications   Medication     acetaminophen (TYLENOL) tablet 650 mg     bisacodyl (DULCOLAX) Suppository 10 mg     bupivacaine (MARCAINE) 0.25 % injection     dextrose 5% in lactated ringers infusion     ferrous sulfate (IRON) tablet 325 mg     hydrocortisone 2.5 % cream     ibuprofen (ADVIL/MOTRIN) tablet 800 mg     lanolin ointment     lidocaine (LMX4) kit     lidocaine 1 % 1 mL     No MMR Needed -  Assessment: Patient does not need MMR vaccine     ondansetron (ZOFRAN) injection 4 mg     oxyCODONE IR (ROXICODONE) tablet 5-10 mg     oxytocin (PITOCIN) 30 units in 500 mL 0.9% NaCl infusion     oxytocin (PITOCIN) injection 10 Units     ROPivacaine 0.2% (NAROPIN) 600 mL, ON-Q 5 inch (-Q) silver  catheter 2 catheter in ON-Q C-Bloc select flow ([id number] holds 600-750 mL) dual cath disposable pump      senna-docusate (SENOKOT-S;PERICOLACE) 8.6-50 MG per tablet 1 tablet    Or     senna-docusate (SENOKOT-S;PERICOLACE) 8.6-50 MG per tablet 2 tablet     simethicone (MYLICON) chewable tablet 80 mg     sodium phosphate (FLEET ENEMA) 1 enema     tranexamic acid (CYKLOKAPRON) 1 g in sodium chloride 0.9 % 50 mL bolus             Physical Exam:   All vitals stable  Temp: 96.7  F (35.9  C) Temp src: Axillary BP: 121/80 Pulse: 95 Heart Rate: 87 Resp: 16        Wound clean and dry with minimal or no drainage.  Surrounding skin with minimal erythema.          Data:     All laboratory data related to this surgery reviewed  Hemoglobin   Date Value Ref Range Status   09/25/2018 10.2 (L) 11.7 - 15.7 g/dL Final   09/23/2018 13.0 11.7 - 15.7 g/dL Final   06/21/2018 11.7 11.7 - 15.7 g/dL Final   01/16/2018 14.1 11.7 - 15.7 g/dL Final   08/30/2017 14.3 11.7 - 15.7 g/dL Final     All imaging studies related to this surgery reviewed    Romulo Basilio MD, MD

## 2018-09-26 NOTE — PLAN OF CARE
Problem: Postpartum ( Delivery) (Adult,Obstetrics,Pediatric)  Goal: Signs and Symptoms of Listed Potential Problems Will be Absent, Minimized or Managed (Postpartum)  Signs and symptoms of listed potential problems will be absent, minimized or managed by discharge/transition of care (reference Postpartum ( Delivery) (Adult,Obstetrics,Pediatric) CPG).   S: Shift review  B:Deanna is a , day 2 post  birth.  A: Stable post-op, incisional dressing was removed. Incision is dry and intact. Left open to air.  Lung sounds-clear, bowel sounds active in all 4 quadrants. Pt had a bowel movement. , independent with mobility, pain control achieved with p.o. pain meds. Handles baby with confidence. Breastfeeding with some assistance from staff.   R: Continue with plan of care. Offer pain meds routinely.

## 2018-09-27 VITALS
TEMPERATURE: 97.8 F | RESPIRATION RATE: 16 BRPM | OXYGEN SATURATION: 97 % | HEART RATE: 98 BPM | DIASTOLIC BLOOD PRESSURE: 70 MMHG | SYSTOLIC BLOOD PRESSURE: 108 MMHG

## 2018-09-27 PROCEDURE — 25000132 ZZH RX MED GY IP 250 OP 250 PS 637: Performed by: FAMILY MEDICINE

## 2018-09-27 RX ORDER — IBUPROFEN 800 MG/1
800 TABLET, FILM COATED ORAL EVERY 8 HOURS PRN
Qty: 90 TABLET | Refills: 0 | Status: SHIPPED | OUTPATIENT
Start: 2018-09-27 | End: 2018-10-25

## 2018-09-27 RX ORDER — AMOXICILLIN 250 MG
1 CAPSULE ORAL 2 TIMES DAILY PRN
Qty: 30 TABLET | Refills: 0 | Status: SHIPPED | OUTPATIENT
Start: 2018-09-27 | End: 2018-10-25

## 2018-09-27 RX ORDER — ACETAMINOPHEN 325 MG/1
650 TABLET ORAL EVERY 6 HOURS
Qty: 100 TABLET | COMMUNITY
Start: 2018-09-27 | End: 2018-10-25

## 2018-09-27 RX ORDER — LANOLIN 100 %
OINTMENT (GRAM) TOPICAL
COMMUNITY
Start: 2018-09-27 | End: 2018-10-25

## 2018-09-27 RX ORDER — OXYCODONE HYDROCHLORIDE 5 MG/1
5-10 TABLET ORAL EVERY 6 HOURS PRN
Qty: 15 TABLET | Refills: 0 | Status: SHIPPED | OUTPATIENT
Start: 2018-09-27 | End: 2018-10-25

## 2018-09-27 RX ADMIN — ACETAMINOPHEN 650 MG: 325 TABLET ORAL at 08:39

## 2018-09-27 RX ADMIN — OXYCODONE HYDROCHLORIDE 5 MG: 5 TABLET ORAL at 08:39

## 2018-09-27 RX ADMIN — OXYCODONE HYDROCHLORIDE 5 MG: 5 TABLET ORAL at 01:46

## 2018-09-27 RX ADMIN — ACETAMINOPHEN 650 MG: 325 TABLET ORAL at 14:48

## 2018-09-27 RX ADMIN — OXYCODONE HYDROCHLORIDE 5 MG: 5 TABLET ORAL at 14:48

## 2018-09-27 RX ADMIN — FERROUS SULFATE TAB 325 MG (65 MG ELEMENTAL FE) 325 MG: 325 (65 FE) TAB at 08:40

## 2018-09-27 RX ADMIN — ACETAMINOPHEN 650 MG: 325 TABLET ORAL at 01:45

## 2018-09-27 RX ADMIN — IBUPROFEN 800 MG: 800 TABLET ORAL at 10:54

## 2018-09-27 RX ADMIN — IBUPROFEN 800 MG: 800 TABLET ORAL at 04:41

## 2018-09-27 RX ADMIN — OXYCODONE HYDROCHLORIDE 5 MG: 5 TABLET ORAL at 10:54

## 2018-09-27 RX ADMIN — OXYCODONE HYDROCHLORIDE 5 MG: 5 TABLET ORAL at 04:41

## 2018-09-27 NOTE — DISCHARGE SUMMARY
[facility] Discharge Summary    Deanna Leahy MRN# [id number]   Age: 23 year old YOB: 1995     Date of Admission:  2018  Date of Discharge::  2018  Admitting Physician:  Douglas Cardenas MD  Discharge Physician:  Romulo Basilio MD, MD     Home clinic: [facility]          Admission Diagnoses:   Persistent Breech   Breech presentation  Intrauterine pregnancy at 39 weeks gestation          Discharge Diagnosis:   Breech presentation  Intrauterine pregnancy at 39 weeks gestation  Primary  section          Procedures:   Procedure(s): Primary low transverse  section       No other significant procedures performed during this admission           Medications Prior to Admission:     Prescriptions Prior to Admission   Medication Sig Dispense Refill Last Dose     Ferrous Sulfate (IRON SUPPLEMENT PO) Take 325 mg by mouth daily   2018 at 0900time     Prenatal Vit-Fe Fumarate-FA (PRENATAL MULTIVITAMIN PLUS IRON) 27-0.8 MG TABS per tablet Take 1 tablet by mouth daily 100 tablet 3 2018 at -0900 time     order for DME Equipment being ordered:Breast pump 1 Device 0 Taking             Discharge Medications:     Current Discharge Medication List      START taking these medications    Details   acetaminophen (TYLENOL) 325 MG tablet Take 2 tablets (650 mg) by mouth every 6 hours  Qty: 100 tablet    Associated Diagnoses: S/P  section      ibuprofen (ADVIL/MOTRIN) 800 MG tablet Take 1 tablet (800 mg) by mouth every 8 hours as needed for moderate pain  Qty: 90 tablet, Refills: 0    Associated Diagnoses: S/P  section      lanolin ointment Apply topically every hour as needed for dry skin (soreness)    Associated Diagnoses: Breast feeding status of mother      oxyCODONE IR (ROXICODONE) 5 MG tablet Take 1-2 tablets (5-10 mg) by mouth every 6 hours as needed for moderate to severe pain  Qty: 15 tablet, Refills: 0    Associated Diagnoses: S/P   section      senna-docusate (SENOKOT-S;PERICOLACE) 8.6-50 MG per tablet Take 1 tablet by mouth 2 times daily as needed for constipation  Qty: 30 tablet, Refills: 0    Associated Diagnoses: S/P  section         CONTINUE these medications which have NOT CHANGED    Details   Ferrous Sulfate (IRON SUPPLEMENT PO) Take 325 mg by mouth daily      Prenatal Vit-Fe Fumarate-FA (PRENATAL MULTIVITAMIN PLUS IRON) 27-0.8 MG TABS per tablet Take 1 tablet by mouth daily  Qty: 100 tablet, Refills: 3    Comments: OTC  Associated Diagnoses: Pregnancy examination or test, pregnancy unconfirmed      order for DME Equipment being ordered:Breast pump  Qty: 1 Device, Refills: 0    Associated Diagnoses: Breast feeding status of mother                   Consultations:   No consultations were requested during this admission          Brief History of Labor or Admission:     Briefly, pt was scheduled for primary low transverse  due to persistent breech presentation.  Pt had an otherwise uncomplicated pregnancy.  Rh negative.           Hospital Course:   The patient's hospital course was unremarkable.  She recovered as anticipated and experienced no post-operative complications.  On discharge, her pain was well controlled. Vaginal bleeding is similar to peak menstrual flow.  Voiding without difficulty.  Ambulating well and tolerating a normal diet.  No fever or significant wound drainage.  Breastfeeding well.  Infant is stable.  She was discharged on post-partum day #3.    Post-partum hemoglobin:   Hemoglobin   Date Value Ref Range Status   2018 10.2 (L) 11.7 - 15.7 g/dL Final             Discharge Instructions and Follow-Up:   Discharge diet: Regular   Discharge activity: Pelvic rest: abstain from intercourse and do not use tampons for 4-6 week(s)   Discharge follow-up: Follow up with primary care provider in 7 days   Wound care: Drink plenty of fluids  Ice to area for comfort  Keep wound clean and dry            Discharge Disposition:   Discharged to home      Attestation:  I have reviewed today's vital signs, notes, medications, labs and imaging.    Romulo Basilio MD, MD

## 2018-09-27 NOTE — DISCHARGE INSTRUCTIONS
Discharge Instructions for  Section ()  You had a  section, or . During the , your baby was delivered through a surgical incision in your stomach and uterus. Full recovery after a  can take time. It s important to take care of yourself -- for your own sake and because your new baby needs you. Here are some guidelines to follow at home.  Incision care  Here's how to take care of your incision:    Shower as needed. Pat your incision dry.    Watch your incision for signs of infection, like increasing redness or drainage.    Hold a pillow against the incision when you laugh or cough and when you get up from a lying or sitting position.    Remember, it can take as long as 6 weeks for a  incision to heal.  Activity  Here are some suggestions:    Don t try to take care of anyone other than your baby and yourself.    Remember, the more active you are, the more likely you are to have an increase in your bleeding.    Get lots of rest. Take naps in the afternoon.    Increase your activities gradually.    Plan your activities so that you don t have to go up or down stairs more than necessary.    Do postsurgical deep breathing and coughing exercises. Ask your healthcare provider for instructions.    Don t lift anything heavier than your baby until your healthcare provider tells you it s OK.    Don t drive until your healthcare provider says it s OK.    Don t have sexual intercourse until after you ve had a follow-up appointment with your healthcare provider and you have decided on a birth control method.  Follow-up  Make a follow-up appointment as directed by our staff.  When to call your healthcare provider  Call your healthcare provider right away if you have any of the following:    Fever of 100.4 F (38 C) or higher    Redness, pain, or drainage at your incision site    Bleeding that requires a new sanitary pad every hour    Severe pain in the abdomen    Pain or  urgency with urination    Foul odor from vaginal discharge    Trouble urinating or emptying your bladder    No bowel movement within 1 week after the birth of your baby    Swollen, red, painful area in the leg    Appearance of rash or hives    Sore, red, painful area on the breasts that may be accompanied by flu-like symptoms    Feelings of anxiety, panic, and/or depression   Date Last Reviewed: 8/16/2015 2000-2017 [employer]. [address] [place] [address], [place] [address]. All rights reserved. This information is not intended as a substitute for professional medical care. Always follow your healthcare professional's instructions.

## 2018-09-27 NOTE — PROGRESS NOTES
S: Discharge  to home    B: Patient had a  delivery for breech presentation with no complications. Baby girl Baby's name Teresa breast: Husbands name is Tyler.      A: Pt independent with self and  cares at time of discharge. Pt's  was not able to be at the hospital much because he wasas home with the stomach flu.   Breastfeeding with a nipple shield.  [place] is sleepy for feedings. [place] had a great then 10% weight loss at discharge. Pt's milk has come in.     R:  Discharge instructions reviewed and questions answered.  Belongings gathered and returned to the patient. Agreed to follow up in 6 weeks or sooner with any question or concerns.     Nursing Discharge Checklist:    Pneumovax screened and given, if appropriate: N/A  Influenza vaccine screened and given, if appropriate: NO  Staples removed (): N/A  Breast milk returned: N/A  Hydrogel pads sent home:N/A  Birth Certificate Done: YES

## 2018-09-29 ENCOUNTER — TELEPHONE (OUTPATIENT)
Dept: OBGYN | Facility: CLINIC | Age: 23
End: 2018-09-29

## 2018-09-29 NOTE — TELEPHONE ENCOUNTER
"S: Triage Phone Call    B: Deanna is a 23 y.o.  @ 5 days postpartum who called with complaint of incision drainage    A: [facility] stating that she delivered by  on 18 and was discharged to home 2 days ago. She states that she has had \"spots\" of yellow discharge from one end of her incision drain throughout the day with activity. She denies increased pain, redness, swelling, warmth at sight or odorous discharge. She denies chills or fevers. She states that she has not had increased uterine bleeding either. She has a follow up appointment with [facility] on Monday.    R: She will continue to monitor for s/s of infection: pain, redness, swelling, warmth, odor or increasing drainage, fevers or chills. If she has further concerns regarding her incision, she will follow up in the ER for evaluation. She will plan to see Dr. Basilio on Monday.  "

## 2018-10-01 NOTE — PROGRESS NOTES
Deanna Leahy  Gender: female  : 1995  [address]  [facility] [address]  [phone] (home)   Medical Record: [id number]  Primary Care Provider: Sho De La Torre [facility]   ?   Discharge Phone Call: Key Words/Key Times     How are you and the baby?     How are feedings going?     Voiding & Stooling?     Any questions or concerns?     Follow-up appointment?       We want to provide excellent care here at [facility]. Do you have any feedback for us that would help us improve?     Call back COMMENTS:  10/1/18 1006 discharge pp call attempt no answer L/M per RUTHANN Higuera RN      Attempted Calls:   _________     __________

## 2018-10-02 ENCOUNTER — MYC MEDICAL ADVICE (OUTPATIENT)
Dept: FAMILY MEDICINE | Facility: OTHER | Age: 23
End: 2018-10-02

## 2018-10-02 NOTE — PROGRESS NOTES
Deanna Leahy  Gender: female  : 1995  [address]  [facility] [address]  [phone] (home)   Medical Record: [id number]  Primary Care Provider: Sho De La Torre [facility]   ?   Discharge Phone Call: Key Words/Key Times     How are you and the baby? good    How are feedings going?  Great my milk is in feed on demand    Voiding & Stooling? Yes yellow stools    Any questions or concerns? no    Follow-up appointment? Will make next time I see DR. Rojo WITH MY BABY      We want to provide excellent care here at [facility]. Do you have any feedback for us that would help us improve? NO    Call back COMMENTS:SONALI  WAS GREAT.      Attempted Calls:   _________     __________

## 2018-10-05 ENCOUNTER — MYC MEDICAL ADVICE (OUTPATIENT)
Dept: FAMILY MEDICINE | Facility: OTHER | Age: 23
End: 2018-10-05

## 2018-10-25 ENCOUNTER — OFFICE VISIT (OUTPATIENT)
Dept: FAMILY MEDICINE | Facility: OTHER | Age: 23
End: 2018-10-25
Payer: COMMERCIAL

## 2018-10-25 ENCOUNTER — MYC MEDICAL ADVICE (OUTPATIENT)
Dept: FAMILY MEDICINE | Facility: OTHER | Age: 23
End: 2018-10-25

## 2018-10-25 VITALS
WEIGHT: 146.2 LBS | DIASTOLIC BLOOD PRESSURE: 74 MMHG | SYSTOLIC BLOOD PRESSURE: 98 MMHG | RESPIRATION RATE: 16 BRPM | TEMPERATURE: 96.6 F | HEART RATE: 72 BPM | BODY MASS INDEX: 26.31 KG/M2

## 2018-10-25 DIAGNOSIS — Z98.891 S/P CESAREAN SECTION: ICD-10-CM

## 2018-10-25 DIAGNOSIS — T14.8XXA BLEEDING FROM WOUND: Primary | ICD-10-CM

## 2018-10-25 PROBLEM — O09.90 SUPERVISION OF HIGH-RISK PREGNANCY: Status: RESOLVED | Noted: 2018-09-14 | Resolved: 2018-10-25

## 2018-10-25 PROBLEM — Z34.80 ENCOUNTER FOR SUPERVISION OF OTHER NORMAL PREGNANCY, UNSPECIFIED TRIMESTER: Status: RESOLVED | Noted: 2018-03-03 | Resolved: 2018-10-25

## 2018-10-25 PROCEDURE — 99212 OFFICE O/P EST SF 10 MIN: CPT | Performed by: FAMILY MEDICINE

## 2018-10-25 ASSESSMENT — PAIN SCALES - GENERAL: PAINLEVEL: NO PAIN (1)

## 2018-10-25 NOTE — TELEPHONE ENCOUNTER
Spoke with patient.  Closer to end of incision on right side.  1/2 in long.  Looks like scab was coming off. Has been spotting that last couple days.  Not very painful.    Does have some white discharge. Would like to be seen.     Huddled with DJ. Ok for quick check at 130.    Jasper Smith, RN, BSN

## 2018-10-25 NOTE — MR AVS SNAPSHOT
After Visit Summary   10/25/2018    Deanna Leahy    MRN: [id number]           Patient Information     Date Of Birth          1995        Visit Information        Provider Department      10/25/2018 1:30 PM Romulo Basilio MD [facility]        Care Instructions    Thank you for visiting [facility]    Can gently tease off the glue in the shower.      If continued bleeding or especially if increased bleeding, let me know.    Please Follow Up when indicated on the section below this one on your After-Visit Summary.      If you had imaging scheduled please refer to your radiology prep sheet.    Appointment    Date_______________     Time_____________    Day:   M TU W TH F    With____________________________    Location_________________________    If you need medication refills, please contact your pharmacy 3 days before your prescriptions runs out. If you are out of refills, your pharmacy will contact contact the clinic.    Contact us or return if questions or concerns.     -Your Care Team:  MD Sho Hooks PA-C Joel De Haan, PA-C Elizabeth McLean, APRN CNP Wendy Sabinske, APRN, JERICHO Kinney, CNP     General information about your clinic      Clinic hours:     Lab hours:  Phone [phone]  Monday 7:30 am-7 pm    Monday 8:30 am-6:30 pm  Tuesday-Friday 7:30 am-5 pm   Tuesday-Friday 8:30 am-4:30 pm    Pharmacy hours:  Phone [phone]  Monday 8:30 am-7pm  Tuesday-Friday 8:30am-6 pm                                     Mychart assistance [phone]    We would like to hear from you, how was your visit today?    Dionne Phan  Patient Information Supervisor   Patient Care Supervisor  [facility] [place], and [facility], and [facility]  [phone]             Follow-ups after your visit        Follow-up notes from your care team     Return in  about 2 weeks (around 11/8/2018) for pospartum check.      Your next 10 appointments already scheduled     Nov 05, 2018  4:15 PM CST   [facility] Post-Partum with Romulo Basilio MD   [facility] ([facility])    [address] [facility] [address] [phone]              Who to contact     If you have questions or need follow up information about today's clinic visit or your schedule please contact [facility] directly at [phone].  Normal or non-critical lab and imaging results will be communicated to you by [employer], letter or phone within 4 business days after the clinic has received the results. If you do not hear from us within 7 days, please contact the clinic through [employer] or phone. If you have a critical or abnormal lab result, we will notify you by phone as soon as possible.  Submit refill requests through [employer] or call your pharmacy and they will forward the refill request to us. Please allow 3 business days for your refill to be completed.          Additional Information About Your Visit        MyChart Information     [employer] gives you secure access to your electronic health record. If you see a primary care provider, you can also send messages to your care team and make appointments. If you have questions, please call your primary care clinic.  If you do not have a primary care provider, please call [phone] and they will assist you.        Care EveryWhere ID     This is your Care EveryWhere ID. This could be used by other organizations to access your [facility] medical records  [id number]        Your Vitals Were     Pulse Temperature Respirations Last Period BMI (Body Mass Index)       72 96.6  F (35.9  C) (Temporal) 16 12/25/2017 (Exact Date) 26.31 kg/m2        Blood Pressure from Last 3 Encounters:   10/25/18 98/74   09/27/18 108/70   09/19/18 104/78    Weight from Last 3 Encounters:   10/25/18 146 lb 3.2 oz (66.3 kg)    09/19/18 167 lb 6.4 oz (75.9 kg)   09/12/18 163 lb 6.4 oz (74.1 kg)              Today, you had the following     No orders found for display       Primary Care Provider Office Phone # Fax #    Sho De La Torre PA-C [phone] [address] [place] [address]        Equal Access to Services     [facility]: Hadii aad ku hadasho Soomaali, waaxda luqadaha, qaybta kaalmada adeegyada, waxay idiin hayaan adeeg kharash la'aan . So [facility] [phone].    ATENCIÓN: Si habla español, tiene a simons disposición servicios gratuitos de asistencia lingüística. Llame al [phone].    We comply with applicable federal civil rights laws and [place] laws. We do not discriminate on the basis of race, color, national origin, age, disability, sex, sexual orientation, or gender identity.            Thank you!     Thank you for choosing [facility]  for your care. Our goal is always to provide you with excellent care. Hearing back from our patients is one way we can continue to improve our services. Please take a few minutes to complete the written survey that you may receive in the mail after your visit with us. Thank you!             Your Updated Medication List - Protect others around you: Learn how to safely use, store and throw away your medicines at www.disposemymeds.org.          This list is accurate as of 10/25/18  1:52 PM.  Always use your most recent med list.                   Brand Name Dispense Instructions for use Diagnosis    order for DME     1 Device    Equipment being ordered:Breast pump    Breast feeding status of mother

## 2018-10-25 NOTE — PATIENT INSTRUCTIONS
Thank you for visiting [facility]    Can gently tease off the glue in the shower.      If continued bleeding or especially if increased bleeding, let me know.    Please Follow Up when indicated on the section below this one on your After-Visit Summary.      If you had imaging scheduled please refer to your radiology prep sheet.    Appointment    Date_______________     Time_____________    Day:   M TU W TH F    With____________________________    Location_________________________    If you need medication refills, please contact your pharmacy 3 days before your prescriptions runs out. If you are out of refills, your pharmacy will contact contact the clinic.    Contact us or return if questions or concerns.     -Your Care Team:  MD Sho Hooks PA-C Joel De Haan, PA-C Elizabeth McLean, APRN CNP  Daina Michael APRN, CNP  Neha Barclay, APRN, CNP     General information about your clinic      Clinic hours:     Lab hours:  Phone [phone]  Monday 7:30 am-7 pm    Monday 8:30 am-6:30 pm  Tuesday-Friday 7:30 am-5 pm   Tuesday-Friday 8:30 am-4:30 pm    Pharmacy hours:  Phone [phone]  Monday 8:30 am-7pm  Tuesday-Friday 8:30am-6 pm                                     Mychart assistance [phone]    We would like to hear from you, how was your visit today?    Dionne Phan  Patient Information Supervisor   Patient Care Supervisor  [facility] [place], and \[facility]\"", and [facility]  [phone]

## 2018-10-25 NOTE — PROGRESS NOTES
SUBJECTIVE:   Deanna Leahy is a 23 year old female who presents to clinic today for the following health issues:    Incision check    A couple of days ago, started to have bleeding from her incision.  Has continued since that time.  Seems a little goopy.  It looks like some of the glue/scab is ripping off.    HPI    Problem list and histories reviewed & adjusted, as indicated.  Additional history: as documented      Current Outpatient Prescriptions   Medication Sig Dispense Refill     order for DME Equipment being ordered:Breast pump 1 Device 0     Recent Labs   Lab Test  18   1119  17   0928  17   1001  13   1915   ALT  20   --    --    --    CR  0.60   --    --   0.68   GFRESTIMATED  >90   --    --   >90   GFRESTBLACK  >90   --    --   >90   POTASSIUM  4.3   --    --   3.8   TSH   --   2.48  1.76   --       BP Readings from Last 3 Encounters:   10/25/18 98/74   18 108/70   18 104/78    Wt Readings from Last 3 Encounters:   10/25/18 146 lb 3.2 oz (66.3 kg)   18 167 lb 6.4 oz (75.9 kg)   18 163 lb 6.4 oz (74.1 kg)                    ROS:  Constitutional, HEENT, cardiovascular, pulmonary, gi and gu systems are negative, except as otherwise noted.    OBJECTIVE:     BP 98/74 (BP Location: Left arm, Patient Position: Chair, Cuff Size: Adult Regular)  Pulse 72  Temp 96.6  F (35.9  C) (Temporal)  Resp 16  Wt 146 lb 3.2 oz (66.3 kg)  LMP 2017 (Exact Date)  BMI 26.31 kg/m2  Body mass index is 26.31 kg/(m^2).  GENERAL: healthy, alert and no distress  SKIN: healing incision from .  One are of glue is coming off, but no active bleeding or infection present.    Diagnostic Test Results:  none     ASSESSMENT/PLAN:       ICD-10-CM    1. Bleeding from wound T14.8XXA    2. S/P  section Z98.891      Reassured patient.  Discussed continued wound management.  Plan follow-up in 2 weeks at her postpartum check.  If further problems in the interim, will  see back in clinic.    Portions of this note were completed using Dragon dictation software.  Although reviewed, there may be typographical and other inadvertent errors that remain.         Patient Instructions   Thank you for visiting [facility]    Can gently tease off the glue in the shower.      If continued bleeding or especially if increased bleeding, let me know.    Please Follow Up when indicated on the section below this one on your After-Visit Summary.      If you had imaging scheduled please refer to your radiology prep sheet.    Appointment    Date_______________     Time_____________    Day:   M TU W TH F    With____________________________    Location_________________________    If you need medication refills, please contact your pharmacy 3 days before your prescriptions runs out. If you are out of refills, your pharmacy will contact contact the clinic.    Contact us or return if questions or concerns.     -Your Care Team:  MD Sho Hooks PA-C Joel De Haan, PA-C Elizabeth McLean, APRN CNP  JERICHO Myles, CNP  JERICHO Conley, CNP     General information about your clinic      Clinic hours:     Lab hours:  Phone [phone]  Monday 7:30 am-7 pm    Monday 8:30 am-6:30 pm  Tuesday-Friday 7:30 am-5 pm   Tuesday-Friday 8:30 am-4:30 pm    Pharmacy hours:  Phone [phone]  Monday 8:30 am-7pm  Tuesday-Friday 8:30am-6 pm                                     Mychart assistance [phone]    We would like to hear from you, how was your visit today?    Dionne Phan  Patient Information Supervisor   Patient Care Supervisor  [facility], [facility], and [facility]  [phone]         Romulo Basilio MD, MD  [facility]

## 2018-11-06 NOTE — PROGRESS NOTES
SUBJECTIVE:   Deanna Leahy is a 23 year old female who presents to clinic today for the following health issues:      HPI  6 week f/u pp. No concerns. Questions about bc.  Answers for HPI/ROS submitted by the patient on 2018   If you checked off any problems, how difficult have these problems made it for you to do your work, take care of things at home, or get along with other people?: Not difficult at all  PHQ9 TOTAL SCORE: 0  KI 7 TOTAL SCORE: 0      Deanna is here for a 6-week postpartum checkup.    She had a c/s for breech or transverse of a viable girl, weight 8 pounds 0 oz., with no complications. Date of delivery was 2018. Since delivery, she has been breast feeding.  She has no signs of infection, bleeding or other complications.  She is not pregnant.  We discussed contraceptions and she has chosen mini pill / progesterone only pill.      Post partum tubal: No  History of Gestational Diabetes? No  Type of Delivery:    Feeding Method:  Breast  If initiated breast feeding and stopped, how long did you breast feed?:      REVIEW OF SYSTEMS:  Ears/Nose/Throat: negative  Respiratory: negative  Cardiovascular: negative  Gastrointestinal: negative  Genitourinary: negative  Musculoskeletal: negative    Neurologic: negative   Skin: negative   Endocrine:  negative  Vagina: negative  Cervix: negative  Breasts: negative  Vulva: negative  Episiotomy: NA    Contraception Plan: mini pill / progesterone only pill    Medical History Reviewed yes -   Family History Reviewed yes -   Problem List Updated yes -     EXAM:  /78  Pulse 96  Temp 97.3  F (36.3  C) (Temporal)  Resp 14  Wt 144 lb 9.6 oz (65.6 kg)  LMP 2017 (Exact Date)  Breastfeeding? Yes  BMI 26.03 kg/m2  HEENT: grossly normal.  NECK: no lymphadenopathy or thyroidomegaly.  LUNGS: CTA X 2, no rales or crackles.  BACK: No spinal or CVA tenderness.  HEART: RRR without murmurs clicks or gallops.  ABDOMEN: soft, non tender,  good bowel sounds, without masses rebound, guarding or tenderness.  PELVIC:  External genitalia; normal without lesion, repair well healed.   Vagina: normal mucosa and rugae, no discharge.  Cervix: multiparous, well healed, without lesion.  Uterus: non pregnant in size, firm , mobile, no lesions,     Normal shape, position and consistency  Adnexa: non tender, without masses.    EXTREMITIES:  warm to touch, good pulses, no ankle edema or calf tenderness.  NEUROLOGIC: grossly normal.    ASSESSMENT:   6-week postpartum exam after c/s for breech or transverse.    PLAN:    Contraception methods discussed  Exercise encouraged  Follow up in 1 year  PAP smear obtained.  One-hour glucose tolerance test needed? No  mini pill / progesterone only pill for contraception.

## 2018-11-09 ENCOUNTER — PRENATAL OFFICE VISIT (OUTPATIENT)
Dept: FAMILY MEDICINE | Facility: OTHER | Age: 23
End: 2018-11-09
Payer: COMMERCIAL

## 2018-11-09 VITALS
DIASTOLIC BLOOD PRESSURE: 78 MMHG | SYSTOLIC BLOOD PRESSURE: 112 MMHG | TEMPERATURE: 97.3 F | WEIGHT: 144.6 LBS | RESPIRATION RATE: 14 BRPM | HEART RATE: 96 BPM | BODY MASS INDEX: 26.03 KG/M2

## 2018-11-09 DIAGNOSIS — Z30.011 ENCOUNTER FOR INITIAL PRESCRIPTION OF CONTRACEPTIVE PILLS: ICD-10-CM

## 2018-11-09 DIAGNOSIS — Z98.891 S/P CESAREAN SECTION: ICD-10-CM

## 2018-11-09 PROCEDURE — G0145 SCR C/V CYTO,THINLAYER,RESCR: HCPCS | Performed by: FAMILY MEDICINE

## 2018-11-09 PROCEDURE — 99207 ZZC POST PARTUM EXAM: CPT | Performed by: FAMILY MEDICINE

## 2018-11-09 RX ORDER — ACETAMINOPHEN AND CODEINE PHOSPHATE 120; 12 MG/5ML; MG/5ML
0.35 SOLUTION ORAL DAILY
Qty: 84 TABLET | Refills: 4 | Status: SHIPPED | OUTPATIENT
Start: 2018-11-09 | End: 2019-10-11

## 2018-11-09 ASSESSMENT — ANXIETY QUESTIONNAIRES
5. BEING SO RESTLESS THAT IT IS HARD TO SIT STILL: NOT AT ALL
2. NOT BEING ABLE TO STOP OR CONTROL WORRYING: NOT AT ALL
6. BECOMING EASILY ANNOYED OR IRRITABLE: NOT AT ALL
1. FEELING NERVOUS, ANXIOUS, OR ON EDGE: NOT AT ALL
7. FEELING AFRAID AS IF SOMETHING AWFUL MIGHT HAPPEN: NOT AT ALL
7. FEELING AFRAID AS IF SOMETHING AWFUL MIGHT HAPPEN: NOT AT ALL
GAD7 TOTAL SCORE: 0
3. WORRYING TOO MUCH ABOUT DIFFERENT THINGS: NOT AT ALL
GAD7 TOTAL SCORE: 0
4. TROUBLE RELAXING: NOT AT ALL
GAD7 TOTAL SCORE: 0

## 2018-11-09 ASSESSMENT — PATIENT HEALTH QUESTIONNAIRE - PHQ9
SUM OF ALL RESPONSES TO PHQ QUESTIONS 1-9: 0
SUM OF ALL RESPONSES TO PHQ QUESTIONS 1-9: 0
10. IF YOU CHECKED OFF ANY PROBLEMS, HOW DIFFICULT HAVE THESE PROBLEMS MADE IT FOR YOU TO DO YOUR WORK, TAKE CARE OF THINGS AT HOME, OR GET ALONG WITH OTHER PEOPLE: NOT DIFFICULT AT ALL

## 2018-11-09 ASSESSMENT — PAIN SCALES - GENERAL: PAINLEVEL: NO PAIN (0)

## 2018-11-09 NOTE — PATIENT INSTRUCTIONS
"Thank you for visiting [facility]    Start the \"mini-pill\" for birth control.  Let me know if problems.      We'll let you know your pap results as soon as we can.     Contact us or return if questions or concerns.     Have a nice day!    Dr. Basilio     Please Follow Up when indicated on the section below this one on your After-Visit Summary.      If you had imaging scheduled please refer to your radiology prep sheet.    Appointment    Date_______________     Time_____________    Day:   M TU W TH F    With____________________________    Location_________________________    If you need medication refills, please contact your pharmacy 3 days before your prescriptions runs out. If you are out of refills, your pharmacy will contact contact the clinic.    Contact us or return if questions or concerns.     -Your Care Team:  MD Sho Hooks, NADER Jules, APRN CNP  Daina Michael, APRN, CNP  Neha Barclay, APRN, CNP     General information about your clinic      Clinic hours:     Lab hours:  Phone [phone]  Monday 7:30 am-7 pm    Monday 8:30 am-6:30 pm  Tuesday-Friday 7:30 am-5 pm   Tuesday-Friday 8:30 am-4:30 pm    Pharmacy hours:  Phone [phone]  Monday 8:30 am-7pm  Tuesday-Friday 8:30am-6 pm                                     Mychart assistance [phone]    We would like to hear from you, how was your visit today?    Dionne Phan  Patient Information Supervisor   Patient Care Supervisor  [facility], and [facility], and [facility]  [phone]     "

## 2018-11-09 NOTE — MR AVS SNAPSHOT
"              After Visit Summary   11/9/2018    Deanna Leahy    MRN: [id number]           Patient Information     Date Of Birth          1995        Visit Information        Provider Department      11/9/2018 2:00 PM Romulo Basilio MD [facility]        Today's Diagnoses     Routine postpartum follow-up    -  1    Encounter for initial prescription of contraceptive pills          Care Instructions    Thank you for visiting [facility]    Start the \"mini-pill\" for birth control.  Let me know if problems.      We'll let you know your pap results as soon as we can.     Contact us or return if questions or concerns.     Have a nice day!    Dr. Basilio     Please Follow Up when indicated on the section below this one on your After-Visit Summary.      If you had imaging scheduled please refer to your radiology prep sheet.    Appointment    Date_______________     Time_____________    Day:   M TU W TH F    With____________________________    Location_________________________    If you need medication refills, please contact your pharmacy 3 days before your prescriptions runs out. If you are out of refills, your pharmacy will contact contact the clinic.    Contact us or return if questions or concerns.     -Your Care Team:  MD Sho Hooks PA-C Joel De Haan, PA-C Elizabeth McLean, APRN CNP Wendy Sabinske, APRN, JERICHO Kinney, CNP     General information about your clinic      Clinic hours:     Lab hours:  Phone [phone]  Monday 7:30 am-7 pm    Monday 8:30 am-6:30 pm  Tuesday-Friday 7:30 am-5 pm   Tuesday-Friday 8:30 am-4:30 pm    Pharmacy hours:  Phone [phone]  Monday 8:30 am-7pm  Tuesday-Friday 8:30am-6 pm                                     Mychart assistance [phone]    We would like to hear from you, how was your visit today?    Dionne Phan  Patient Information Supervisor   Patient Care " Supervisor  [place] [place], and [facility] [place], and [facility]  [phone]             Follow-ups after your visit        Follow-up notes from your care team     Return in about 1 year (around 11/9/2019) for Physical Exam.      Who to contact     If you have questions or need follow up information about today's clinic visit or your schedule please contact [facility] directly at [phone].  Normal or non-critical lab and imaging results will be communicated to you by Y-Clientshart, letter or phone within 4 business days after the clinic has received the results. If you do not hear from us within 7 days, please contact the clinic through Jixee or phone. If you have a critical or abnormal lab result, we will notify you by phone as soon as possible.  Submit refill requests through Jixee or call your pharmacy and they will forward the refill request to us. Please allow 3 business days for your refill to be completed.          Additional Information About Your Visit        Y-Clients[employer] Information     Jixee gives you secure access to your electronic health record. If you see a primary care provider, you can also send messages to your care team and make appointments. If you have questions, please call your primary care clinic.  If you do not have a primary care provider, please call [phone] and they will assist you.        Care EveryWhere ID     This is your Care EveryWhere ID. This could be used by other organizations to access your [facility] medical records  [id number]        Your Vitals Were     Pulse Temperature Respirations Last Period Breastfeeding? BMI (Body Mass Index)    96 97.3  F (36.3  C) (Temporal) 14 12/25/2017 (Exact Date) Yes 26.03 kg/m2       Blood Pressure from Last 3 Encounters:   11/09/18 112/78   10/25/18 98/74   09/27/18 108/70    Weight from Last 3 Encounters:   11/09/18 144 lb 9.6 oz (65.6 kg)   10/25/18 146 lb 3.2 oz (66.3  kg)   09/19/18 167 lb 6.4 oz (75.9 kg)              We Performed the Following     OB HEMOGLOBIN     Pap imaged thin layer screen reflex to HPV if ASCUS - recommended age 25 - 29 years          Today's Medication Changes          These changes are accurate as of 11/9/18  2:26 PM.  If you have any questions, ask your nurse or doctor.               Start taking these medicines.        Dose/Directions    norethindrone 0.35 MG per tablet   Commonly known as:  MICRONOR   Used for:  Encounter for initial prescription of contraceptive pills   Started by:  Romulo Basilio MD        Dose:  0.35 mg   Take 1 tablet (0.35 mg) by mouth daily   Quantity:  84 tablet   Refills:  4            Where to get your medicines      These medications were sent to [facility] [place] [address]   [address] [place] [address]     Phone:  [phone]     norethindrone 0.35 MG per tablet                Primary Care Provider Office Phone # Fax #    Sho De La Torre PA-C [phone] [address] [place] [address]        Equal Access to Services     [facility]: Hadii aad ku hadasho Soomaali, waaxda luqadaha, qaybta kaalmada adescott, jasmyn leahy . So [facility] [phone].    ATENCIÓN: Si habla español, tiene a simons disposición servicios gratuitos de asistencia lingüística. Antonio al [phone].    We comply with applicable federal civil rights laws and [place] laws. We do not discriminate on the basis of race, color, national origin, age, disability, sex, sexual orientation, or gender identity.            Thank you!     Thank you for choosing [facility]  for your care. Our goal is always to provide you with excellent care. Hearing back from our patients is one way we can continue to improve our services. Please take a few minutes to complete the written survey that you may receive in the mail after your visit with us. Thank  you!             Your Updated Medication List - Protect others around you: Learn how to safely use, store and throw away your medicines at www.disposemymeds.org.          This list is accurate as of 11/9/18  2:26 PM.  Always use your most recent med list.                   Brand Name Dispense Instructions for use Diagnosis    norethindrone 0.35 MG per tablet    MICRONOR    84 tablet    Take 1 tablet (0.35 mg) by mouth daily    Encounter for initial prescription of contraceptive pills       order for DME     1 Device    Equipment being ordered:Breast pump    Breast feeding status of mother       VITAMIN D-3 PO

## 2018-11-10 ASSESSMENT — PATIENT HEALTH QUESTIONNAIRE - PHQ9: SUM OF ALL RESPONSES TO PHQ QUESTIONS 1-9: 0

## 2018-11-10 ASSESSMENT — ANXIETY QUESTIONNAIRES: GAD7 TOTAL SCORE: 0

## 2018-11-13 LAB
COPATH REPORT: NORMAL
PAP: NORMAL

## 2019-02-05 ENCOUNTER — MYC MEDICAL ADVICE (OUTPATIENT)
Dept: FAMILY MEDICINE | Facility: OTHER | Age: 24
End: 2019-02-05

## 2019-04-26 ENCOUNTER — APPOINTMENT (OUTPATIENT)
Dept: CT IMAGING | Facility: CLINIC | Age: 24
End: 2019-04-26
Attending: FAMILY MEDICINE
Payer: COMMERCIAL

## 2019-04-26 ENCOUNTER — HOSPITAL ENCOUNTER (EMERGENCY)
Facility: CLINIC | Age: 24
Discharge: HOME | End: 2019-04-27
Attending: FAMILY MEDICINE | Admitting: FAMILY MEDICINE
Payer: COMMERCIAL

## 2019-04-26 DIAGNOSIS — S70.01XA CONTUSION OF RIGHT HIP, INITIAL ENCOUNTER: ICD-10-CM
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
## 2019-04-26 DIAGNOSIS — R31.0 GROSS HEMATURIA: ICD-10-CM

## 2019-04-26 LAB
ALBUMIN UR-MCNC: NEGATIVE MG/DL
ANION GAP SERPL CALCULATED.3IONS-SCNC: 9 MMOL/L (ref 3–14)
APPEARANCE UR: ABNORMAL
BACTERIA #/AREA URNS HPF: ABNORMAL /HPF
BASOPHILS # BLD AUTO: 0 10E9/L (ref 0–0.2)
BASOPHILS NFR BLD AUTO: 0.2 %
BILIRUB UR QL STRIP: NEGATIVE
BUN SERPL-MCNC: 15 MG/DL (ref 7–30)
CALCIUM SERPL-MCNC: 8.7 MG/DL (ref 8.5–10.1)
CHLORIDE SERPL-SCNC: 108 MMOL/L (ref 94–109)
CO2 SERPL-SCNC: 25 MMOL/L (ref 20–32)
COLOR UR AUTO: YELLOW
CREAT SERPL-MCNC: 0.57 MG/DL (ref 0.52–1.04)
DIFFERENTIAL METHOD BLD: NORMAL
EOSINOPHIL NFR BLD AUTO: 1 %
ERYTHROCYTE [DISTWIDTH] IN BLOOD BY AUTOMATED COUNT: 11.9 % (ref 10–15)
GFR SERPL CREATININE-BSD FRML MDRD: >90 ML/MIN/{1.73_M2}
GLUCOSE SERPL-MCNC: 112 MG/DL (ref 70–99)
GLUCOSE UR STRIP-MCNC: NEGATIVE MG/DL
HCG UR QL: NEGATIVE
HCT VFR BLD AUTO: 38.2 % (ref 35–47)
HGB BLD-MCNC: 12.6 G/DL (ref 11.7–15.7)
HGB UR QL STRIP: ABNORMAL
IMM GRANULOCYTES # BLD: 0 10E9/L (ref 0–0.4)
IMM GRANULOCYTES NFR BLD: 0.2 %
KETONES UR STRIP-MCNC: NEGATIVE MG/DL
LEUKOCYTE ESTERASE UR QL STRIP: NEGATIVE
LYMPHOCYTES # BLD AUTO: 2.1 10E9/L (ref 0.8–5.3)
LYMPHOCYTES NFR BLD AUTO: 24.8 %
MCH RBC QN AUTO: 29 PG (ref 26.5–33)
MCHC RBC AUTO-ENTMCNC: 33 G/DL (ref 31.5–36.5)
MCV RBC AUTO: 88 FL (ref 78–100)
MONOCYTES # BLD AUTO: 0.6 10E9/L (ref 0–1.3)
MONOCYTES NFR BLD AUTO: 7.4 %
MUCOUS THREADS #/AREA URNS LPF: PRESENT /LPF
NEUTROPHILS # BLD AUTO: 5.5 10E9/L (ref 1.6–8.3)
NEUTROPHILS NFR BLD AUTO: 66.4 %
NITRATE UR QL: NEGATIVE
NRBC # BLD AUTO: 0 10*3/UL
NRBC BLD AUTO-RTO: 0 /100
PH UR STRIP: 6 PH (ref 5–7)
PLATELET # BLD AUTO: 283 10E9/L (ref 150–450)
POTASSIUM SERPL-SCNC: 4 MMOL/L (ref 3.4–5.3)
RBC # BLD AUTO: 4.35 10E12/L (ref 3.8–5.2)
RBC #/AREA URNS AUTO: >182 /HPF (ref 0–2)
SODIUM SERPL-SCNC: 142 MMOL/L (ref 133–144)
SOURCE: ABNORMAL
SP GR UR STRIP: 1.01 (ref 1–1.03)
SQUAMOUS #/AREA URNS AUTO: 5 /HPF (ref 0–1)
UROBILINOGEN UR STRIP-MCNC: 0 MG/DL (ref 0–2)
WBC # BLD AUTO: 8.4 10E9/L (ref 4–11)
WBC #/AREA URNS AUTO: 0 /HPF (ref 0–5)

## 2019-04-26 PROCEDURE — 80048 BASIC METABOLIC PNL TOTAL CA: CPT | Performed by: FAMILY MEDICINE

## 2019-04-26 PROCEDURE — 81025 URINE PREGNANCY TEST: CPT | Performed by: FAMILY MEDICINE

## 2019-04-26 PROCEDURE — 99285 EMERGENCY DEPT VISIT HI MDM: CPT | Mod: 25 | Performed by: FAMILY MEDICINE

## 2019-04-26 PROCEDURE — 81001 URINALYSIS AUTO W/SCOPE: CPT | Performed by: FAMILY MEDICINE

## 2019-04-26 PROCEDURE — 74177 CT ABD & PELVIS W/CONTRAST: CPT

## 2019-04-26 PROCEDURE — 25000128 H RX IP 250 OP 636: Performed by: FAMILY MEDICINE

## 2019-04-26 PROCEDURE — 99284 EMERGENCY DEPT VISIT MOD MDM: CPT | Mod: Z6 | Performed by: FAMILY MEDICINE

## 2019-04-26 PROCEDURE — 85025 COMPLETE CBC W/AUTO DIFF WBC: CPT | Performed by: FAMILY MEDICINE

## 2019-04-26 PROCEDURE — 36415 COLL VENOUS BLD VENIPUNCTURE: CPT | Performed by: FAMILY MEDICINE

## 2019-04-26 RX ORDER — IOPAMIDOL 755 MG/ML
100 INJECTION, SOLUTION INTRAVASCULAR ONCE
Status: COMPLETED | OUTPATIENT
Start: 2019-04-26 | End: 2019-04-26

## 2019-04-26 RX ADMIN — IOPAMIDOL 70 ML: 755 INJECTION, SOLUTION INTRAVENOUS at 23:36

## 2019-04-26 NOTE — ED AVS SNAPSHOT
[facility]  [address] [place] [address]  Phone:  [phone]  Fax:  [phone]                                    Deanna Leahy   MRN: [id number]    Department:  [facility]   Date of Visit:  4/26/2019           After Visit Summary Signature Page    I have received my discharge instructions, and my questions have been answered. I have discussed any challenges I see with this plan with the nurse or doctor.    ..........................................................................................................................................  Patient/Patient Representative Signature      ..........................................................................................................................................  Patient Representative Print Name and Relationship to Patient    ..................................................               ................................................  Date                                   Time    ..........................................................................................................................................  Reviewed by Signature/Title    ...................................................              ..............................................  Date                                               Time          22EPIC Rev 08/18

## 2019-04-27 VITALS
DIASTOLIC BLOOD PRESSURE: 81 MMHG | TEMPERATURE: 97.3 F | OXYGEN SATURATION: 99 % | SYSTOLIC BLOOD PRESSURE: 118 MMHG | RESPIRATION RATE: 18 BRPM | HEART RATE: 94 BPM

## 2019-04-27 NOTE — ED NOTES
Event occurred at about 1830 this evening.  Patient did take Aleeve PTA which resolved most all of her pain.

## 2019-04-27 NOTE — ED PROVIDER NOTES
[facility] ED Provider Note   Patient: Deanna Leahy  MRN #:  [id number]  Date of Visit: 2019    CC:     Chief Complaint   Patient presents with     Fall     HPI:  Deanna Leahy is a 24 year old female who presented to the emergency department with gross hematuria, following an injury that she sustained earlier this evening.  Patient states that she was bucked off of a horse at around 6:30 PM.  She did not have a helmet on and was thrown from the horse.  She hit the side of her head but did not have any loss of consciousness.  She landed on her right hip area.  There is some swelling and bruising developing now, with a pain level of 3/10.  She initially had a little bit of neck stiffness and soreness, but that has improved as well.  She had the wind knocked out of her and it took her a minute to catch her breath.  Patient was able to get up and ambulate on her own, and at about 9 PM this evening, she noticed some blood in her urine.  It started out with a brownish color, followed by more of a red color after she drank more water.  Patient reports a general pain level of 2/10 in her head and neck, and 3/10 in her right hip.  She denies any chest, abdominal or flank pain.  Her last period was 2 weeks ago.  She had a  section 7 months ago.  Patient is accompanied by her .    Problem List:  Patient Active Problem List    Diagnosis Date Noted     S/P  section 2018     Priority: Medium     Acquired lactose intolerance 2018     Priority: Medium     Rh negative state in antepartum period, unspecified trimester 2018     Priority: Medium     Gastroesophageal reflux disease, esophagitis presence not specified 2018     Priority: Medium     Iron deficiency anemia, unspecified iron deficiency anemia type 2017     Priority: Medium     CARDIOVASCULAR SCREENING; LDL GOAL LESS THAN 160  2016     Priority: Medium     Contraception 05/15/2015     Priority: Medium     Ingrown toenail 2012     Priority: Medium     Cystic acne 2011     Priority: Medium       Past Medical History:   Diagnosis Date     NO ACTIVE PROBLEMS      Supervision of high-risk pregnancy 2018       MEDS:     Cholecalciferol (VITAMIN D-3 PO)   norethindrone (MICRONOR) 0.35 MG per tablet   order for DME       ALLERGIES:    Allergies   Allergen Reactions     No Known Drug Allergies        Past Surgical History:   Procedure Laterality Date      SECTION N/A 2018    Procedure:  SECTION;  multi select  Delivery;  Surgeon: Douglas Cardenas MD;  Location:  L+D      TOOTH EXTRACTION W/FORCEP         Social History     Tobacco Use     Smoking status: Never Smoker     Smokeless tobacco: Never Used   Substance Use Topics     Alcohol use: No     Drug use: No         Review of Systems   Except as noted in HPI, all other systems were reviewed and are negative    Physical Exam     Vitals were reviewed  Patient Vitals for the past 8 hrs:   BP Temp Temp src Pulse Resp SpO2   19 2225 122/85 97.3  F (36.3  C) Temporal 95 18 99 %     GENERAL APPEARANCE: Alert and oriented x3.  GCS of 15.  Primary survey is unremarkable  HEAD: Atraumatic; no cephalhematoma  FACE: normal facies  EYES: Pupils are equal and reactive to light; extraocular muscles are intact  HENT: normal external exam; no TMJ tenderness, no dental trauma, oral exam is benign  NECK: no adenopathy or asymmetry; supple with normal active range of motion; no midline tenderness  RESP: normal respiratory effort; clear breath sounds bilaterally  CV: regular rate and rhythm; no significant murmurs, gallops or rubs  ABD: soft, no tenderness; no CVA tenderness, no rebound or guarding; bowel sounds are normal  MS: no gross deformities noted; normal muscle tone.  No midline spinal tenderness  EXT: No calf tenderness or pitting edema; right  lateral hip tenderness; normal range of motion in both hip joints, knees and ankles  SKIN: Slight bruising reported to the right lateral hip  NEURO: no facial droop; no focal deficits, speech is normal        Available Lab/Imaging Results     Results for orders placed or performed during the hospital encounter of 04/26/19 (from the past 24 hour(s))   UA reflex to Microscopic   Result Value Ref Range    Color Urine Yellow     Appearance Urine Slightly Cloudy     Glucose Urine Negative NEG^Negative mg/dL    Bilirubin Urine Negative NEG^Negative    Ketones Urine Negative NEG^Negative mg/dL    Specific Gravity Urine 1.010 1.003 - 1.035    Blood Urine Large (A) NEG^Negative    pH Urine 6.0 5.0 - 7.0 pH    Protein Albumin Urine Negative NEG^Negative mg/dL    Urobilinogen mg/dL 0.0 0.0 - 2.0 mg/dL    Nitrite Urine Negative NEG^Negative    Leukocyte Esterase Urine Negative NEG^Negative    Source Midstream Urine     RBC Urine >182 (H) 0 - 2 /HPF    WBC Urine 0 0 - 5 /HPF    Bacteria Urine Few (A) NEG^Negative /HPF    Squamous Epithelial /HPF Urine 5 (H) 0 - 1 /HPF    Mucous Urine Present (A) NEG^Negative /LPF   HCG qualitative urine   Result Value Ref Range    HCG Qual Urine Negative NEG^Negative   Basic metabolic panel   Result Value Ref Range    Sodium 142 133 - 144 mmol/L    Potassium 4.0 3.4 - 5.3 mmol/L    Chloride 108 94 - 109 mmol/L    Carbon Dioxide 25 20 - 32 mmol/L    Anion Gap 9 3 - 14 mmol/L    Glucose 112 (H) 70 - 99 mg/dL    Urea Nitrogen 15 7 - 30 mg/dL    Creatinine 0.57 0.52 - 1.04 mg/dL    GFR Estimate >90 >60 mL/min/[1.73_m2]    GFR Estimate If Black >90 >60 mL/min/[1.73_m2]    Calcium 8.7 8.5 - 10.1 mg/dL   CBC with platelets differential   Result Value Ref Range    WBC 8.4 4.0 - 11.0 10e9/L    RBC Count 4.35 3.8 - 5.2 10e12/L    Hemoglobin 12.6 11.7 - 15.7 g/dL    Hematocrit 38.2 35.0 - 47.0 %    MCV 88 78 - 100 fl    MCH 29.0 26.5 - 33.0 pg    MCHC 33.0 31.5 - 36.5 g/dL    RDW 11.9 10.0 - 15.0 %     Platelet Count 283 150 - 450 10e9/L    Diff Method Automated Method     % Neutrophils 66.4 %    % Lymphocytes 24.8 %    % Monocytes 7.4 %    % Eosinophils 1.0 %    % Basophils 0.2 %    % Immature Granulocytes 0.2 %    Nucleated RBCs 0 0 /100    Absolute Neutrophil 5.5 1.6 - 8.3 10e9/L    Absolute Lymphocytes 2.1 0.8 - 5.3 10e9/L    Absolute Monocytes 0.6 0.0 - 1.3 10e9/L    Absolute Basophils 0.0 0.0 - 0.2 10e9/L    Abs Immature Granulocytes 0.0 0 - 0.4 10e9/L    Absolute Nucleated RBC 0.0    CT ABDOMEN PELVIS W CONTRAST    Narrative    CT ABDOMEN PELVIS W CONTRAST 4/26/2019 11:48 PM    HISTORY: Moderate blunt abdominal trauma. Gross hematuria. Right hip  and pelvic pain.    COMPARISON: None.    TECHNIQUE:  Abdomen and pelvis CT was performed following intravenous  administration of Isovue 370, 70mL.  Radiation dose for this scan was  reduced using automated exposure control, adjustment of the mA and/or  kV according to patient size, or iterative reconstruction technique.    FINDINGS: Lung bases are clear.    Liver, gallbladder, spleen, pancreas, adrenal glands and kidneys  appear normal.    Normal caliber bowel. No free air. No free or loculated fluid  collection.    Uterus and adnexa appear normal for age. Urinary bladder is distended  with normal wall thickness. No free fluid in the pelvis.    Osseous structures appear normal.      Impression    IMPRESSION: No acute abnormality.    HALEY GIBSON MD                Impression     Final diagnoses:   Gross hematuria   Fall from horse, initial encounter   Contusion of right hip, initial encounter         ED Course & Medical Decision Making   Deanna Leahy is a 24 year old female who presented to the emergency department with acute gross hematuria, following a fall off a horse approximately 4 hours ago.  Patient was bucked off of a horse around 6:30 PM.  She had minor head injury and had the wind knocked out of her.  She also has a contusion to the right lateral  hip.  Pain in her head and neck have improved.  She still has some discomfort in the right hip region but has been ambulating.  She did not have any abdominal or chest trauma.  She started to have gross hematuria around 9 PM tonight, approximately 2-1/2 hours after injury.  She described a brownish color initially followed by a more reddish color she drink more water.  Patient was seen shortly after arrival.  Her pain level is rated 2/10 in the neck and head, and 3/10 in the right hip.  Vital signs are stable.  GCS is 15 and she does not have any other neurologic symptoms. Her exam reveals some tenderness in the right lateral hip region.  She did not have any flank tenderness and there is no bruising noted in the abdomen or flank.  Urinalysis revealed greater than 182 red blood cells with no white blood cells.  CBC and basic metabolic panel were normal.  CT scan of the abdomen and pelvis with contrast reveals no acute abnormality.  Patient was reassured.  There is no obvious traumatic cause for her hematuria.  We will have her follow-up in the clinic in 2-3 days to repeat a urinalysis.  Continue to drink plenty of fluids.  Watch for any new or worsening symptoms.  Return to the ED this weekend if symptoms worsen.             Written after-visit summary and instructions were given at the time of discharge.    Follow up Plan:   No follow-up provider specified.    Discharge Medications: None       This note was dictated using electronic voice recognition software and although reviewed, may contain grammatical and spelling errors.        Cookie Bynum MD  04/27/19 [id number]

## 2019-04-27 NOTE — ED TRIAGE NOTES
Pt was bucked off a horse at 18:30. Fell about 6 ft, hit a fence on the way down. Had neck pain, no loc. Now urinating blood. No neck pain after aleve. Feels maybe a head injury. Trauma eval called.  Patient ambulated without issue.

## 2019-04-27 NOTE — DISCHARGE INSTRUCTIONS
But there was blood in the urine.  Please see the attached handout.  At the CT scan of your abdomen and pelvis were reassuring.  Drink plenty of water throughout the day.  Follow-up in the clinic in 3 days to repeat the urinalysis.  You may need further testing if you continue to have blood in the urine.  Return to the ED at any time this weekend if your symptoms worsen.

## 2019-09-08 ENCOUNTER — OFFICE VISIT (OUTPATIENT)
Dept: URGENT CARE | Facility: RETAIL CLINIC | Age: 24
End: 2019-09-08
Payer: COMMERCIAL

## 2019-09-08 VITALS
TEMPERATURE: 98 F | SYSTOLIC BLOOD PRESSURE: 125 MMHG | OXYGEN SATURATION: 99 % | HEART RATE: 83 BPM | DIASTOLIC BLOOD PRESSURE: 85 MMHG

## 2019-09-08 DIAGNOSIS — J01.90 ACUTE SINUSITIS WITH SYMPTOMS > 10 DAYS: ICD-10-CM

## 2019-09-08 DIAGNOSIS — H65.91 OME (OTITIS MEDIA WITH EFFUSION), RIGHT: Primary | ICD-10-CM

## 2019-09-08 PROCEDURE — 99213 OFFICE O/P EST LOW 20 MIN: CPT | Performed by: NURSE PRACTITIONER

## 2019-09-08 ASSESSMENT — ENCOUNTER SYMPTOMS
SINUS PAIN: 1
CHILLS: 0
ACTIVITY CHANGE: 0
COUGH: 0
DIZZINESS: 1
HEADACHES: 0
SINUS PRESSURE: 1
FEVER: 0
FATIGUE: 0
DIAPHORESIS: 0
ADENOPATHY: 0
APPETITE CHANGE: 0

## 2019-09-08 NOTE — PROGRESS NOTES
Chief Complaint   Patient presents with     Ear Problem     both ears hurt x 3-4 days     SUBJECTIVE:  Deanna Leahy is a 24 year old female who presents for evaluation of bilateral ear pain, fullness, dizziness for 4 day(s). She has also had ethmoid sinus pain for about a month.  Severity: moderate   Timing: gradual onset and worsening  Treatment measures tried include: Antihistamine.  History of recurrent otitis: yes  Predisposing factors include: seasonal allergies.    Past Medical History:   Diagnosis Date     NO ACTIVE PROBLEMS      Supervision of high-risk pregnancy 9/14/2018       Current Outpatient Medications on File Prior to Visit:  Cholecalciferol (VITAMIN D-3 PO)    norethindrone (MICRONOR) 0.35 MG per tablet Take 1 tablet (0.35 mg) by mouth daily (Patient not taking: Reported on 9/8/2019)   order for DME Equipment being ordered:Breast pump (Patient not taking: Reported on 9/8/2019)     No current facility-administered medications on file prior to visit.   Social History     Tobacco Use     Smoking status: Never Smoker     Smokeless tobacco: Never Used   Substance Use Topics     Alcohol use: No     Allergies   Allergen Reactions     No Known Drug Allergies      Review of Systems   Constitutional: Negative for activity change, appetite change, chills, diaphoresis, fatigue and fever.   HENT: Positive for congestion, ear discharge (watery from both), ear pain, sinus pressure, sinus pain and tinnitus. Negative for hearing loss.    Respiratory: Negative for cough.    Allergic/Immunologic: Positive for environmental allergies.   Neurological: Positive for dizziness. Negative for headaches.   Hematological: Negative for adenopathy.     OBJECTIVE:  /85   Pulse 83   Temp 98  F (36.7  C) (Oral)   SpO2 99%      Physical Exam   Constitutional: She is oriented to person, place, and time. She appears well-developed and well-nourished. No distress.   HENT:   Head: Normocephalic and atraumatic.   Nose:  Nose normal.   Mouth/Throat: Oropharynx is clear and moist.   Bilateral tympanic membranes with effusion, right is erythematous. Ethmoid sinus tenderness.   Eyes: Pupils are equal, round, and reactive to light. EOM are normal.   Neck: Normal range of motion. Neck supple.   Cardiovascular: Normal rate and intact distal pulses.   Pulmonary/Chest: Effort normal and breath sounds normal.   Musculoskeletal: Normal range of motion.   Lymphadenopathy:     She has no cervical adenopathy.   Neurological: She is alert and oriented to person, place, and time. Coordination normal.   Skin: Skin is warm and dry. Capillary refill takes less than 2 seconds. No rash noted. She is not diaphoretic. No pallor.   Psychiatric: She has a normal mood and affect. Her behavior is normal.   Vitals reviewed.    ASSESSMENT:    ICD-10-CM    1. OME (otitis media with effusion), right H65.91 amoxicillin-clavulanate (AUGMENTIN) 875-125 MG tablet   2. Acute sinusitis with symptoms > 10 days J01.90 amoxicillin-clavulanate (AUGMENTIN) 875-125 MG tablet     PLAN:   Patient Instructions   Augmentin for both ear infection and ethmoid sinusitis  Flonase (fluticasone) 2 sprays in each nostril daily until symptoms resolve, then continue 1 spray in each nostril for at least 5 more days.  Take Tylenol or an NSAID such as ibuprofen or naproxen as needed for pain.  May use netti pot with bottled or distilled water and saline packets to flush sinuses.  Sudafed (pseudoephedrine) behind the pharmacist counter for 3-5 days helps relieve congestion.  Mucinex (guiafenesin) thins mucus and may help it to loosen more quickly  Saline drops or nasal sprays may loosen mucus.  Sit in the bathroom with the door closed and hot shower running to loosen mucus.    Contact primary care clinic if you do not have any relief from your symptoms after 10 days.  Present to emergency room for significantly increasing pain, persistent high fever >102F, swelling/redness around your eyes,  changes in your vision or ability to move your eyes, altered mental status or a severe headache.    Tylenol and/or motrin for pain relief and fever reduction.  Warm compresses next to ear for pain relief.  Drink plenty of fluids and place a humidifier in bedroom.  Mucinex to help reduce fluid in ears (guiafenasin is the generic).  Ear infections are not contagious.  Swimming is ok as long as there is no perforation in the ear drum.   Follow up with primary care provider 10-14 days after starting the antibiotic with any concern of persistent infection.          Follow up with primary care provider with any problems, questions or concerns or if symptoms worsen or fail to improve. Patient agreed to plan and verbalized understanding.    Hilaria Chowdary, YOKASTA-BC  [facility] [place]

## 2019-09-08 NOTE — PATIENT INSTRUCTIONS
Augmentin for both ear infection and ethmoid sinusitis  Flonase (fluticasone) 2 sprays in each nostril daily until symptoms resolve, then continue 1 spray in each nostril for at least 5 more days.  Take Tylenol or an NSAID such as ibuprofen or naproxen as needed for pain.  May use netti pot with bottled or distilled water and saline packets to flush sinuses.  Sudafed (pseudoephedrine) behind the pharmacist counter for 3-5 days helps relieve congestion.  Mucinex (guiafenesin) thins mucus and may help it to loosen more quickly  Saline drops or nasal sprays may loosen mucus.  Sit in the bathroom with the door closed and hot shower running to loosen mucus.    Contact primary care clinic if you do not have any relief from your symptoms after 10 days.  Present to emergency room for significantly increasing pain, persistent high fever >102F, swelling/redness around your eyes, changes in your vision or ability to move your eyes, altered mental status or a severe headache.    Tylenol and/or motrin for pain relief and fever reduction.  Warm compresses next to ear for pain relief.  Drink plenty of fluids and place a humidifier in bedroom.  Mucinex to help reduce fluid in ears (guiafenasin is the generic).  Ear infections are not contagious.  Swimming is ok as long as there is no perforation in the ear drum.   Follow up with primary care provider 10-14 days after starting the antibiotic with any concern of persistent infection.

## 2019-09-25 ENCOUNTER — OFFICE VISIT (OUTPATIENT)
Dept: URGENT CARE | Facility: URGENT CARE | Age: 24
End: 2019-09-25
Payer: COMMERCIAL

## 2019-09-25 VITALS
TEMPERATURE: 98.6 F | BODY MASS INDEX: 26.68 KG/M2 | RESPIRATION RATE: 14 BRPM | OXYGEN SATURATION: 100 % | HEIGHT: 62 IN | SYSTOLIC BLOOD PRESSURE: 112 MMHG | WEIGHT: 145 LBS | DIASTOLIC BLOOD PRESSURE: 64 MMHG | HEART RATE: 98 BPM

## 2019-09-25 DIAGNOSIS — H65.93 BILATERAL NON-SUPPURATIVE OTITIS MEDIA: Primary | ICD-10-CM

## 2019-09-25 PROCEDURE — 99213 OFFICE O/P EST LOW 20 MIN: CPT | Performed by: NURSE PRACTITIONER

## 2019-09-25 RX ORDER — CEFDINIR 300 MG/1
300 CAPSULE ORAL 2 TIMES DAILY
Qty: 20 CAPSULE | Refills: 0 | Status: SHIPPED | OUTPATIENT
Start: 2019-09-25 | End: 2019-10-11

## 2019-09-25 ASSESSMENT — MIFFLIN-ST. JEOR: SCORE: 1360.97

## 2019-09-26 NOTE — NURSING NOTE
"Chief Complaint   Patient presents with     Ear Problem     Has had intermittent ear infections.  Last couple days right ear is very painful     Sinus Problem     Has been swollen and hurting the last couple days.  Has yellow drainage.         Initial /64 (BP Location: Right arm, Patient Position: Chair, Cuff Size: Adult Regular)   Pulse 98   Temp 98.6  F (37  C) (Tympanic)   Resp 14   Wt 65.8 kg (145 lb)   SpO2 100%   BMI 26.10 kg/m   Estimated body mass index is 26.1 kg/m  as calculated from the following:    Height as of 6/21/18: 1.588 m (5' 2.5\").    Weight as of this encounter: 65.8 kg (145 lb).    Patient presents to the clinic using No DME    Health Maintenance that is potentially due pending provider review:  NONE    n/a    Is there anyone who you would like to be able to receive your results? No  If yes have patient fill out LOL[facility]  Aditya Gupta M.A.          "

## 2019-09-26 NOTE — PATIENT INSTRUCTIONS
Increase rest and fluids. Tylenol and/or Ibuprofen for comfort. Cool mist vaporizer. If your symptoms worsen or do not resolve follow up with your primary care provider in 1 week and sooner if needed.   Mucinex 600 mg 12 hour formula for ear, head and chest congestion.  It can also thin post nasal drip which can cause a cough and sore throat.    Indications for emergent return to emergency department discussed with patient, who verbalized good understanding and agreement.  Patient understands the limitations of today's evaluation.           Patient Education     Middle Ear Infection (Adult)  You have an infection of the middle ear, the space behind the eardrum. This is also called acute otitis media (AOM). Sometimes it is caused by the common cold. This is because congestion can block the internal passage (eustachian tube) that drains fluid from the middle ear. When the middle ear fills with fluid, bacteria can grow there and cause an infection. Oral antibiotics are used to treat this illness, not ear drops. Symptoms usually start to improve within 1 to 2 days of treatment.    Home care  The following are general care guidelines:    Finish all of the antibiotic medicine given, even though you may feel better after the first few days.    You may use over-the-counter medicine, such as acetaminophen or ibuprofen, to control pain and fever, unless something else was prescribed. If you have chronic liver or kidney disease or have ever had a stomach ulcer or gastrointestinal bleeding, talk with your healthcare provider before using these medicines. Do not give aspirin to anyone under 18 years of age who has a fever. It may cause severe illness or death.  Follow-up care  Follow up with your healthcare provider, or as advised, in 2 weeks if all symptoms have not gotten better, or if hearing doesn't go back to normal within 1 month.  When to seek medical advice  Call your healthcare provider right away if any of these  occur:    Ear pain gets worse or does not improve after 3 days of treatment    Unusual drowsiness or confusion    Neck pain, stiff neck, or headache    Fluid or blood draining from the ear canal    Fever of 100.4 F (38 C) or as advised     Seizure  Date Last Reviewed: 6/1/2016 2000-2018 [employer]. [address] [place], [place] [id number]. All rights reserved. This information is not intended as a substitute for professional medical care. Always follow your healthcare professional's instructions.

## 2019-09-26 NOTE — PROGRESS NOTES
Subjective     Deanna Leahy is a 24 year old female who presents to clinic today for the following health issues:    HPI   Chief Complaint   Patient presents with     Ear Problem     Has had intermittent ear infections.  Last couple days right ear is very painful     Sinus Problem     Has been swollen and hurting the last couple days.  Has yellow drainage.             Patient Active Problem List   Diagnosis     Cystic acne     Ingrown toenail     Contraception     CARDIOVASCULAR SCREENING; LDL GOAL LESS THAN 160     Iron deficiency anemia, unspecified iron deficiency anemia type     Gastroesophageal reflux disease, esophagitis presence not specified     Rh negative state in antepartum period, unspecified trimester     Acquired lactose intolerance     S/P  section     Past Surgical History:   Procedure Laterality Date      SECTION N/A 2018    Procedure:  SECTION;  multi select  Delivery;  Surgeon: Douglas Cardenas MD;  Location:  L+D      TOOTH EXTRACTION W/FORCEP         Social History     Tobacco Use     Smoking status: Never Smoker     Smokeless tobacco: Never Used   Substance Use Topics     Alcohol use: No     Family History   Problem Relation Age of Onset     C.A.D. Maternal Grandfather         50's?     Hyperlipidemia Mother      Anemia Mother      Crohn's Disease Father      Cancer Paternal Grandmother         lung     Prostate Cancer Paternal Grandfather          Current Outpatient Medications   Medication Sig Dispense Refill     cefdinir (OMNICEF) 300 MG capsule Take 1 capsule (300 mg) by mouth 2 times daily for 10 days 20 capsule 0     norethindrone (MICRONOR) 0.35 MG per tablet Take 1 tablet (0.35 mg) by mouth daily (Patient not taking: Reported on 2019) 84 tablet 4     order for DME Equipment being ordered:Breast pump (Patient not taking: Reported on 2019) 1 Device 0     Allergies   Allergen Reactions     No Known Drug Allergies          Reviewed  "and updated as needed this visit by Provider  Tobacco  Allergies  Meds  Problems  Med Hx  Surg Hx  Fam Hx         Review of Systems   ROS COMP: Constitutional, HEENT, cardiovascular, pulmonary, GI, , musculoskeletal, neuro, skin, endocrine and psych systems are negative, except as otherwise noted.      Objective    /64 (BP Location: Right arm, Patient Position: Chair, Cuff Size: Adult Regular)   Pulse 98   Temp 98.6  F (37  C) (Tympanic)   Resp 14   Ht 1.575 m (5' 2\")   Wt 65.8 kg (145 lb)   SpO2 100%   BMI 26.52 kg/m    Body mass index is 26.52 kg/m .  Physical Exam   GENERAL: healthy, alert and no distress, nontoxic in appearance  EYES: Eyes grossly normal to inspection, PERRL and conjunctivae and sclerae normal  HENT: ear canals and TM's intact bilaterally and both mildly inflamed with effusion, right > left, nose and mouth without ulcers or lesions  NECK: no adenopathy, supple with full ROM  RESP: lungs clear to auscultation - no rales, rhonchi or wheezes  CV: regular rate and rhythm, normal S1 S2, no S3 or S4, no murmur, click or rub, no peripheral edema   ABDOMEN: soft, nontender, no hepatosplenomegaly, no masses and bowel sounds normal  MS: no gross musculoskeletal defects noted, no edema  No rash    Diagnostic Test Results:  Labs reviewed in Epic  No results found for this or any previous visit (from the past 24 hour(s)).        Assessment & Plan   Problem List Items Addressed This Visit     None      Visit Diagnoses     Bilateral non-suppurative otitis media    -  Primary    Relevant Medications    cefdinir (OMNICEF) 300 MG capsule             BMI:   Estimated body mass index is 26.52 kg/m  as calculated from the following:    Height as of this encounter: 1.575 m (5' 2\").    Weight as of this encounter: 65.8 kg (145 lb).           Patient Instructions   Increase rest and fluids. Tylenol and/or Ibuprofen for comfort. Cool mist vaporizer. If your symptoms worsen or do not resolve follow " up with your primary care provider in 1 week and sooner if needed.   Mucinex 600 mg 12 hour formula for ear, head and chest congestion.  It can also thin post nasal drip which can cause a cough and sore throat.    Indications for emergent return to emergency department discussed with patient, who verbalized good understanding and agreement.  Patient understands the limitations of today's evaluation.           Patient Education     Middle Ear Infection (Adult)  You have an infection of the middle ear, the space behind the eardrum. This is also called acute otitis media (AOM). Sometimes it is caused by the common cold. This is because congestion can block the internal passage (eustachian tube) that drains fluid from the middle ear. When the middle ear fills with fluid, bacteria can grow there and cause an infection. Oral antibiotics are used to treat this illness, not ear drops. Symptoms usually start to improve within 1 to 2 days of treatment.    Home care  The following are general care guidelines:    Finish all of the antibiotic medicine given, even though you may feel better after the first few days.    You may use over-the-counter medicine, such as acetaminophen or ibuprofen, to control pain and fever, unless something else was prescribed. If you have chronic liver or kidney disease or have ever had a stomach ulcer or gastrointestinal bleeding, talk with your healthcare provider before using these medicines. Do not give aspirin to anyone under 18 years of age who has a fever. It may cause severe illness or death.  Follow-up care  Follow up with your healthcare provider, or as advised, in 2 weeks if all symptoms have not gotten better, or if hearing doesn't go back to normal within 1 month.  When to seek medical advice  Call your healthcare provider right away if any of these occur:    Ear pain gets worse or does not improve after 3 days of treatment    Unusual drowsiness or confusion    Neck pain, stiff neck, or  headache    Fluid or blood draining from the ear canal    Fever of 100.4 F (38 C) or as advised     Seizure  Date Last Reviewed: 6/1/2016 2000-2018 [employer]. [address] [place] [address], [place] [address]. All rights reserved. This information is not intended as a substitute for professional medical care. Always follow your healthcare professional's instructions.             Return in about 10 days (around 10/5/2019) for Follow up with your primary care provider.    JERICHO Duran [facility]

## 2019-10-10 ENCOUNTER — TELEPHONE (OUTPATIENT)
Dept: FAMILY MEDICINE | Facility: OTHER | Age: 24
End: 2019-10-10

## 2019-10-10 ENCOUNTER — NURSE TRIAGE (OUTPATIENT)
Dept: NURSING | Facility: CLINIC | Age: 24
End: 2019-10-10

## 2019-10-10 NOTE — TELEPHONE ENCOUNTER
PCP are you willing to work patient in on 10/11/2019, as of this time you do not have openings other providers in clinic do, please advise   Thanks  Krista MORALES (R)

## 2019-10-10 NOTE — TELEPHONE ENCOUNTER
Reason for Call:  Same Day Appointment, Requested Provider:  Romulo Basilio MD    PCP: Romulo Basilio    Reason for visit: Ear infection that won't go away. Fever    Duration of symptoms:  About 1 month    Have you been treated for this in the past? Yes    Additional comments: Would like an appointment for 10/11/2019    Can we leave a detailed message on this number? YES    Phone number patient can be reached at: Tyler (lucy) [phone]    Best Time: anytime    Call taken on 10/10/2019 at 4:15 PM by Shakira Coelho

## 2019-10-10 NOTE — TELEPHONE ENCOUNTER
Tyler was calling on behalf of his wife who was at home not feeling well and couldn't talk very well. I told him I could only answer general questions and not triage without her there. He said he would call back later he guessed and try to get the small clinic he was trying to get in the first place. I said we are the answering service for the clinic after hours, he said the clinic is still open. I said we will be happy to triage when they are together or answer general questions.    Chelle Hendrix RN/ [facility]

## 2019-10-11 ENCOUNTER — OFFICE VISIT (OUTPATIENT)
Dept: FAMILY MEDICINE | Facility: OTHER | Age: 24
End: 2019-10-11
Payer: COMMERCIAL

## 2019-10-11 VITALS
OXYGEN SATURATION: 100 % | BODY MASS INDEX: 23.37 KG/M2 | RESPIRATION RATE: 16 BRPM | TEMPERATURE: 97.1 F | DIASTOLIC BLOOD PRESSURE: 66 MMHG | HEIGHT: 62 IN | WEIGHT: 127 LBS | HEART RATE: 112 BPM | SYSTOLIC BLOOD PRESSURE: 108 MMHG

## 2019-10-11 DIAGNOSIS — J30.1 SEASONAL ALLERGIC RHINITIS DUE TO POLLEN: ICD-10-CM

## 2019-10-11 DIAGNOSIS — H69.93 DYSFUNCTION OF BOTH EUSTACHIAN TUBES: Primary | ICD-10-CM

## 2019-10-11 DIAGNOSIS — H65.93 OME (OTITIS MEDIA WITH EFFUSION), BILATERAL: ICD-10-CM

## 2019-10-11 PROCEDURE — 99213 OFFICE O/P EST LOW 20 MIN: CPT | Performed by: FAMILY MEDICINE

## 2019-10-11 PROCEDURE — 92567 TYMPANOMETRY: CPT | Performed by: FAMILY MEDICINE

## 2019-10-11 RX ORDER — PREDNISONE 20 MG/1
40 TABLET ORAL DAILY
Qty: 10 TABLET | Refills: 0 | Status: SHIPPED | OUTPATIENT
Start: 2019-10-11 | End: 2019-10-16

## 2019-10-11 ASSESSMENT — MIFFLIN-ST. JEOR: SCORE: 1279.32

## 2019-10-11 ASSESSMENT — PAIN SCALES - GENERAL: PAINLEVEL: NO PAIN (1)

## 2019-10-11 NOTE — PATIENT INSTRUCTIONS
"Thank you for visiting [facility]    Take an antihistamine (Claritin, Zyrtec, Allegra, Benadryl or similar generic - loratadine, cetirizine, fexofenadine, diphenhydramine) to help with your symptoms.     Try the Flonase to help with your nasal congestion.  Can use twice daily if needed while you're ill.    If worsening or not improving over the next few days, then start prednisone.    If not responding to above, let's get you in with ENT.    Let me know if problems.     Contact us or return if questions or concerns.     Have a nice day!    Dr. Basilio     No follow-ups on file.      If you had imaging scheduled please refer to your radiology prep sheet.      If you need medication refills, please contact your pharmacy 3 days before your prescriptions runs out or download the [facility] cici for your smart phone.   If you are out of refills, your pharmacy will contact contact the clinic.    Contact us or return if questions or concerns.     -Your [facility] Care Team:    MD Sho Hooks PA-C Joel De Haan, PA-C Anna Niesen, PA-C Elizabeth \"Sharita\" JERICHO Palacios CNP, APRN, JERICHO Kinney, AKUA Smith, RN, BSN       General information about your   [facility]      Clinic hours:     Lab hours:  Phone [phone]  Monday 7:30 am-7 pm    Monday 8:30 am-6:30 pm  Tuesday-Friday 7:30 am-5 pm   Tuesday-Friday 8:30 am-4:30 pm    Pharmacy hours:  Phone [phone]  Monday 8:30 am-7pm  Tuesday-Friday 8:30am-6 pm                                     Mychart assistance [phone]    We would like to hear from you, how was your visit today?    Dionne Phan  Patient Information Supervisor   Patient Care Supervisor  [facility], and [facility]  [phone]      "

## 2019-10-11 NOTE — PROGRESS NOTES
Subjective     Deanna Leahy is a 24 year old female who presents to clinic today for the following health issues:    HPI   Acute Illness   Acute illness concerns: ear  Onset: x2 days     Fever: YES    Chills/Sweats: YES    Headache (location?): no    Sinus Pressure:YES    Conjunctivitis:  no    Ear Pain: YES: right    Rhinorrhea: YES    Congestion: YES    Sore Throat: YES     Cough: YES-productive of clear sputum    Wheeze: no    Decreased Appetite: no    Nausea: YES    Vomiting: no    Diarrhea:  no    Dysuria/Freq.: YES- peeing out mucus     Fatigue/Achiness: YES    Sick/Strep Exposure: no     Therapies Tried and outcome: Sleep, Ibuprofen     Pt has had mostly right ear pain for a few days, but has had 3 ear infections this month.  Treated with Augmentin and then Omnicef.      Has also been very dizzy.  This has been going on and off all fall.  Does get fall allergies.  Light bothers her eyes, coincides with the dizziness.  Last couple of days, has had some runny nose, cough, etc.  Did have a fever, but that's resolved.       Takes some Claritin and advil for her allergies.  Planning on trying Flonase as well.    Current Outpatient Medications   Medication Sig Dispense Refill     predniSONE (DELTASONE) 20 MG tablet Take 2 tablets (40 mg) by mouth daily for 5 days 10 tablet 0     order for DME Equipment being ordered:Breast pump (Patient not taking: Reported on 9/8/2019) 1 Device 0     Allergies   Allergen Reactions     No Known Drug Allergies      Recent Labs   Lab Test 04/26/19  2323 01/16/18  1119 08/30/17  0928 05/03/17  1001   ALT  --  20  --   --    CR 0.57 0.60  --   --    GFRESTIMATED >90 >90  --   --    GFRESTBLACK >90 >90  --   --    POTASSIUM 4.0 4.3  --   --    TSH  --   --  2.48 1.76      BP Readings from Last 3 Encounters:   10/11/19 108/66   09/25/19 112/64   09/08/19 125/85    Wt Readings from Last 3 Encounters:   10/11/19 57.6 kg (127 lb)   09/25/19 65.8 kg (145 lb)   11/09/18 65.6 kg (144 lb  "9.6 oz)                    Reviewed and updated as needed this visit by Provider         Review of Systems   ROS COMP: Constitutional, HEENT, cardiovascular, pulmonary, gi and gu systems are negative, except as otherwise noted.  See above.        Objective    /66   Pulse 112   Temp 97.1  F (36.2  C) (Temporal)   Resp 16   Ht 1.575 m (5' 2\")   Wt 57.6 kg (127 lb)   SpO2 100%   BMI 23.23 kg/m    Body mass index is 23.23 kg/m .  Physical Exam   GENERAL: healthy, alert and no distress  EYES: Eyes grossly normal to inspection, PERRL and conjunctivae and sclerae normal  HENT: normal cephalic/atraumatic, both ears: clear effusion, nose and mouth without ulcers or lesions, oropharynx clear and oral mucous membranes moist  NECK: no adenopathy, no asymmetry, masses, or scars and thyroid normal to palpation  RESP: lungs clear to auscultation - no rales, rhonchi or wheezes  CV: regular rate and rhythm, normal S1 S2, no S3 or S4, no murmur, click or rub, no peripheral edema and peripheral pulses strong  ABDOMEN: soft, nontender, no hepatosplenomegaly, no masses and bowel sounds normal  MS: no gross musculoskeletal defects noted, no edema    Diagnostic Test Results:  Labs reviewed in Epic  Results for orders placed or performed during the hospital encounter of 04/26/19   CT ABDOMEN PELVIS W CONTRAST    Narrative    CT ABDOMEN PELVIS W CONTRAST 4/26/2019 11:48 PM    HISTORY: Moderate blunt abdominal trauma. Gross hematuria. Right hip  and pelvic pain.    COMPARISON: None.    TECHNIQUE:  Abdomen and pelvis CT was performed following intravenous  administration of Isovue 370, 70mL.  Radiation dose for this scan was  reduced using automated exposure control, adjustment of the mA and/or  kV according to patient size, or iterative reconstruction technique.    FINDINGS: Lung bases are clear.    Liver, gallbladder, spleen, pancreas, adrenal glands and kidneys  appear normal.    Normal caliber bowel. No free air. No free or " loculated fluid  collection.    Uterus and adnexa appear normal for age. Urinary bladder is distended  with normal wall thickness. No free fluid in the pelvis.    Osseous structures appear normal.      Impression    IMPRESSION: No acute abnormality.    HALEY GIBSON MD   UA reflex to Microscopic   Result Value Ref Range    Color Urine Yellow     Appearance Urine Slightly Cloudy     Glucose Urine Negative NEG^Negative mg/dL    Bilirubin Urine Negative NEG^Negative    Ketones Urine Negative NEG^Negative mg/dL    Specific Gravity Urine 1.010 1.003 - 1.035    Blood Urine Large (A) NEG^Negative    pH Urine 6.0 5.0 - 7.0 pH    Protein Albumin Urine Negative NEG^Negative mg/dL    Urobilinogen mg/dL 0.0 0.0 - 2.0 mg/dL    Nitrite Urine Negative NEG^Negative    Leukocyte Esterase Urine Negative NEG^Negative    Source Midstream Urine     RBC Urine >182 (H) 0 - 2 /HPF    WBC Urine 0 0 - 5 /HPF    Bacteria Urine Few (A) NEG^Negative /HPF    Squamous Epithelial /HPF Urine 5 (H) 0 - 1 /HPF    Mucous Urine Present (A) NEG^Negative /LPF   HCG qualitative urine   Result Value Ref Range    HCG Qual Urine Negative NEG^Negative   Basic metabolic panel   Result Value Ref Range    Sodium 142 133 - 144 mmol/L    Potassium 4.0 3.4 - 5.3 mmol/L    Chloride 108 94 - 109 mmol/L    Carbon Dioxide 25 20 - 32 mmol/L    Anion Gap 9 3 - 14 mmol/L    Glucose 112 (H) 70 - 99 mg/dL    Urea Nitrogen 15 7 - 30 mg/dL    Creatinine 0.57 0.52 - 1.04 mg/dL    GFR Estimate >90 >60 mL/min/[1.73_m2]    GFR Estimate If Black >90 >60 mL/min/[1.73_m2]    Calcium 8.7 8.5 - 10.1 mg/dL   CBC with platelets differential   Result Value Ref Range    WBC 8.4 4.0 - 11.0 10e9/L    RBC Count 4.35 3.8 - 5.2 10e12/L    Hemoglobin 12.6 11.7 - 15.7 g/dL    Hematocrit 38.2 35.0 - 47.0 %    MCV 88 78 - 100 fl    MCH 29.0 26.5 - 33.0 pg    MCHC 33.0 31.5 - 36.5 g/dL    RDW 11.9 10.0 - 15.0 %    Platelet Count 283 150 - 450 10e9/L    Diff Method Automated Method     % Neutrophils  66.4 %    % Lymphocytes 24.8 %    % Monocytes 7.4 %    % Eosinophils 1.0 %    % Basophils 0.2 %    % Immature Granulocytes 0.2 %    Nucleated RBCs 0 0 /100    Absolute Neutrophil 5.5 1.6 - 8.3 10e9/L    Absolute Lymphocytes 2.1 0.8 - 5.3 10e9/L    Absolute Monocytes 0.6 0.0 - 1.3 10e9/L    Absolute Basophils 0.0 0.0 - 0.2 10e9/L    Abs Immature Granulocytes 0.0 0 - 0.4 10e9/L    Absolute Nucleated RBC 0.0            Assessment & Plan       ICD-10-CM    1. Dysfunction of both eustachian tubes H69.83 predniSONE (DELTASONE) 20 MG tablet     OTOLARYNGOLOGY REFERRAL     TYMPANOMETRY   2. OME (otitis media with effusion), bilateral H65.93 predniSONE (DELTASONE) 20 MG tablet     TYMPANOMETRY   3. Seasonal allergic rhinitis due to pollen J30.1      I believe the most of her symptoms are related to her eustachian tube dysfunction.  This is likely causing bilateral effusions, but tympanometry today is reassuring.  Discussed trial of nasal steroids, then prednisone if not improving.  If this is insufficient to help with her symptoms, she can see ENT.  Also recommended continued treatment of her seasonal allergies, as this is likely contributing to the eustachian tube dysfunction.    Portions of this note were completed using Dragon dictation software.  Although reviewed, there may be typographical and other inadvertent errors that remain.            Patient Instructions   Thank you for visiting [facility]    Take an antihistamine (Claritin, Zyrtec, Allegra, Benadryl or similar generic - loratadine, cetirizine, fexofenadine, diphenhydramine) to help with your symptoms.     Try the Flonase to help with your nasal congestion.  Can use twice daily if needed while you're ill.    If worsening or not improving over the next few days, then start prednisone.    If not responding to above, let's get you in with ENT.    Let me know if problems.     Contact us or return if questions or concerns.     Have a nice day!      "Praful     No follow-ups on file.      If you had imaging scheduled please refer to your radiology prep sheet.      If you need medication refills, please contact your pharmacy 3 days before your prescriptions runs out or download the [facility] cici for your smart phone.   If you are out of refills, your pharmacy will contact contact the clinic.    Contact us or return if questions or concerns.     -Your [facility] Care Team:    MD Sho Hooks PA-C Joel De Haan, PA-C Anna Niesen, PA-C Elizabeth \"Sharita\" Philip, APRN CNP  Daina Michael, APRN, CNP  Neha Barclay, APRN, CNP  Jasper Smith, RN, BSN       General information about your   [facility]      Clinic hours:     Lab hours:  Phone [phone]  Monday 7:30 am-7 pm    Monday 8:30 am-6:30 pm  Tuesday-Friday 7:30 am-5 pm   Tuesday-Friday 8:30 am-4:30 pm    Pharmacy hours:  Phone [phone]  Monday 8:30 am-7pm  Tuesday-Friday 8:30am-6 pm                                     Mychart assistance [phone]    We would like to hear from you, how was your visit today?    Dionne Phan  Patient Information Supervisor   Patient Care Supervisor  [facility], [facility] and [facility]  [phone]          Return in about 1 year (around 10/11/2020) for Physical Exam.    Romulo Basilio MD, MD  [facility]    "

## 2019-10-16 ENCOUNTER — OFFICE VISIT (OUTPATIENT)
Dept: FAMILY MEDICINE | Facility: CLINIC | Age: 24
End: 2019-10-16
Payer: COMMERCIAL

## 2019-10-16 VITALS
BODY MASS INDEX: 23.19 KG/M2 | OXYGEN SATURATION: 96 % | TEMPERATURE: 98.5 F | RESPIRATION RATE: 16 BRPM | HEART RATE: 130 BPM | DIASTOLIC BLOOD PRESSURE: 68 MMHG | SYSTOLIC BLOOD PRESSURE: 102 MMHG | WEIGHT: 126 LBS | HEIGHT: 62 IN

## 2019-10-16 DIAGNOSIS — J40 BRONCHITIS: Primary | ICD-10-CM

## 2019-10-16 PROCEDURE — 99213 OFFICE O/P EST LOW 20 MIN: CPT | Performed by: FAMILY MEDICINE

## 2019-10-16 RX ORDER — AZITHROMYCIN 250 MG/1
TABLET, FILM COATED ORAL
Qty: 6 TABLET | Refills: 0 | Status: SHIPPED | OUTPATIENT
Start: 2019-10-16 | End: 2020-03-03

## 2019-10-16 ASSESSMENT — MIFFLIN-ST. JEOR: SCORE: 1274.78

## 2019-10-16 NOTE — PROGRESS NOTES
Subjective     Deanna Leahy is a 24 year old female who presents to clinic today for the following health issues:    HPI   Acute Illness   Acute illness concerns: cough, chest congestion, fever   Onset:1 week    Fever: no    Chills/Sweats: YES    Headache (location?): no    Sinus Pressure:YES    Conjunctivitis:  no    Ear Pain: YES: right    Rhinorrhea: YES    Congestion: YES- chest     Sore Throat: no     Cough: YES-productive of clear sputum    Wheeze: no    Decreased Appetite: YES    Nausea: YES    Vomiting: no    Diarrhea:  YES- after she eats     Dysuria/Freq.: no    Fatigue/Achiness: YES    Sick/Strep Exposure: YES- works at a day care center. 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      Therapies Tried and outcome: Mucinex, Dayquil/Nyquil, Vicks' chest rub. Cough drops.     SUBJECTIVE:  Deanna  is a 24 year old female who presents for: Symptoms as noted above.  She is been seen last week for ear concerns.  Had been on some antibiotic in September.  Is scheduled to see ENT.  But she is mainly just coughing coughing up a lot of mucus and she notes that when she eats she has diarrhea.  She is been trying to stick with fluids.  Has had fevers up to 100.    Past Medical History:   Diagnosis Date     NO ACTIVE PROBLEMS      Supervision of high-risk pregnancy 2018     Past Surgical History:   Procedure Laterality Date      SECTION N/A 2018    Procedure:  SECTION;  multi select  Delivery;  Surgeon: Douglas Cardenas MD;  Location:  L+D      TOOTH EXTRACTION W/FORCEP       Social History     Tobacco Use     Smoking status: Never Smoker     Smokeless tobacco: Never Used   Substance Use Topics     Alcohol use: No     Current Outpatient Medications   Medication Sig Dispense Refill     azithromycin (ZITHROMAX) 250 MG tablet Two tablets first day, then one tablet daily for four days. 6 tablet 0     order for DME Equipment being ordered:Breast pump (Patient not taking: Reported on  "9/8/2019) 1 Device 0       REVIEW OF SYSTEMS:   5 point ROS negative except as noted above in HPI, including Gen., Resp, CV, GI &  system review.     OBJECTIVE:  Vitals: /68 (BP Location: Left arm, Patient Position: Sitting, Cuff Size: Adult Regular)   Pulse 130   Temp 98.5  F (36.9  C) (Temporal)   Resp 16   Ht 1.575 m (5' 2\")   Wt 57.2 kg (126 lb)   LMP 09/25/2019 (Approximate)   SpO2 96%   BMI 23.05 kg/m    BMI= Body mass index is 23.05 kg/m .  She appears well.  Mucous membranes are moist.  Posterior throat is a little reddened and some drainage no her TMs both look normal at this time.  Neck supple no adenopathy.  Lungs are generally clear but a very harsh cough noted.  Regular rhythm.  Skin clear.  Abdomen soft bowel sounds present no tenderness.    ASSESSMENT:  #1 bronchitis #2 diarrhea    PLAN:  Doubt that there is a relationship here but she might benefit from a Z-Onel I told her she should probably get some probiotics to take.  Further work-up of her diarrhea if this becomes more of an issue but right now is only when she eats.        Rinku Werner MD  [facility]            "

## 2019-10-16 NOTE — LETTER
October 16, 2019      Deanna Leahy  [id number] [address] [id number]        To Whom It May Concern:    Deanna Leahy  was seen on 10/16/2019.  Please excuse her due to illness.        Sincerely,            Rinku Werner MD

## 2019-10-25 ENCOUNTER — TELEPHONE (OUTPATIENT)
Dept: FAMILY MEDICINE | Facility: OTHER | Age: 24
End: 2019-10-25

## 2019-10-25 NOTE — TELEPHONE ENCOUNTER
You placed a referral for patient to ENT on 10/11/19.  Patient has not scheduled as of yet.      Please review and forward to team if follow up with the patient is needed.     Thank you!  Jasmine/Clinic Referrals Dyad II

## 2019-12-08 ENCOUNTER — HEALTH MAINTENANCE LETTER (OUTPATIENT)
Age: 24
End: 2019-12-08

## 2020-03-03 ENCOUNTER — ALLIED HEALTH/NURSE VISIT (OUTPATIENT)
Dept: FAMILY MEDICINE | Facility: OTHER | Age: 25
End: 2020-03-03
Payer: COMMERCIAL

## 2020-03-03 DIAGNOSIS — N91.2 AMENORRHEA: Primary | ICD-10-CM

## 2020-03-03 LAB — HCG UR QL: POSITIVE

## 2020-03-03 PROCEDURE — 99207 ZZC NO CHARGE NURSE ONLY: CPT

## 2020-03-03 PROCEDURE — 81025 URINE PREGNANCY TEST: CPT | Performed by: FAMILY MEDICINE

## 2020-03-03 NOTE — PATIENT INSTRUCTIONS
"Congratulations! Your pregnancy test is positive!  According to your last menstrual period, you are approximately 04 weeks and 05 days pregnant.  Your estimated due date is 11/05/2020.    To do list:  1. If you have not already started taking a prenatal vitamin, please start today.    2. Schedule a office visit with our OB Intake Nurse: this can be scheduled any time between today and the first visit with your provider.   We would like you see OB intake before your \"first OB\" visit if you choose to deliver at [facility].     3. The first OB visit with is to be scheduled with your OB/GYN or Family Practice Provider that does OB care) typically between 10 and 12 weeks: .  To make these appointments, or if you have any questions and would like to speak to your care team, you can call the clinic's main phone number:       [facility] [place]: [phone] [facility] [place]: [phone] [facility] [place]: [phone]     We would like to hear from you if you have any vaginal bleeding or any moderate to severe abdominal pain/cramping.   You can call any of the phone numbers above and speak with an RN promptly, even if it is after clinic hours, someone will answer your call.     Thank you for choosing [facility]!    Jasper Smith, RN, BSN            "

## 2020-03-03 NOTE — PROGRESS NOTES
Deanna Leahy is a 25 year old here today for a pregnancy test.  LMP: Patient's last menstrual period was 2020 (exact date).  Wt: 0 lbs 0 oz.    Symptoms include absence of menses and fatigue.    Deanna informed of positive pregnancy test results. CATHY: 2020    Educational advice given: nutrition, smoking and drugs & alcohol.    Current medications reviewed: Yes    Previous pregnancy history remarkable for: none.    Plan: schedule appointment with OB Educator and/or OB class, follow-up appointment with Dr. Basilio for pre- care, take multivitamin or pre- vitamins and OB Education packet given.    She is to call back if she has any questions or concerns.  She is advised to notify a provider immediately if she experiences any severe cramping or abdominal pain or any vaginal bleeding.

## 2020-03-10 ENCOUNTER — PRENATAL OFFICE VISIT (OUTPATIENT)
Dept: FAMILY MEDICINE | Facility: OTHER | Age: 25
End: 2020-03-10
Payer: COMMERCIAL

## 2020-03-10 VITALS — HEIGHT: 62 IN | BODY MASS INDEX: 23.74 KG/M2 | WEIGHT: 129 LBS

## 2020-03-10 DIAGNOSIS — Z34.90 SUPERVISION OF NORMAL PREGNANCY: Primary | ICD-10-CM

## 2020-03-10 LAB
ABO + RH BLD: NORMAL
ABO + RH BLD: NORMAL
BLD GP AB SCN SERPL QL: NORMAL
BLOOD BANK CMNT PATIENT-IMP: NORMAL
ERYTHROCYTE [DISTWIDTH] IN BLOOD BY AUTOMATED COUNT: 12.8 % (ref 10–15)
HCT VFR BLD AUTO: 39.8 % (ref 35–47)
HGB BLD-MCNC: 13 G/DL (ref 11.7–15.7)
MCH RBC QN AUTO: 28.5 PG (ref 26.5–33)
MCHC RBC AUTO-ENTMCNC: 32.7 G/DL (ref 31.5–36.5)
MCV RBC AUTO: 87 FL (ref 78–100)
PLATELET # BLD AUTO: 283 10E9/L (ref 150–450)
RBC # BLD AUTO: 4.56 10E12/L (ref 3.8–5.2)
SPECIMEN EXP DATE BLD: NORMAL
WBC # BLD AUTO: 7.1 10E9/L (ref 4–11)

## 2020-03-10 PROCEDURE — 87389 HIV-1 AG W/HIV-1&-2 AB AG IA: CPT | Performed by: FAMILY MEDICINE

## 2020-03-10 PROCEDURE — 86762 RUBELLA ANTIBODY: CPT | Performed by: FAMILY MEDICINE

## 2020-03-10 PROCEDURE — 36415 COLL VENOUS BLD VENIPUNCTURE: CPT | Performed by: FAMILY MEDICINE

## 2020-03-10 PROCEDURE — 86901 BLOOD TYPING SEROLOGIC RH(D): CPT | Performed by: FAMILY MEDICINE

## 2020-03-10 PROCEDURE — 86900 BLOOD TYPING SEROLOGIC ABO: CPT | Performed by: FAMILY MEDICINE

## 2020-03-10 PROCEDURE — 86850 RBC ANTIBODY SCREEN: CPT | Performed by: FAMILY MEDICINE

## 2020-03-10 PROCEDURE — 99207 ZZC NO CHARGE NURSE ONLY: CPT

## 2020-03-10 PROCEDURE — 87086 URINE CULTURE/COLONY COUNT: CPT | Performed by: FAMILY MEDICINE

## 2020-03-10 PROCEDURE — 85027 COMPLETE CBC AUTOMATED: CPT | Performed by: FAMILY MEDICINE

## 2020-03-10 PROCEDURE — 86780 TREPONEMA PALLIDUM: CPT | Performed by: FAMILY MEDICINE

## 2020-03-10 PROCEDURE — 87340 HEPATITIS B SURFACE AG IA: CPT | Performed by: FAMILY MEDICINE

## 2020-03-10 RX ORDER — PRENATAL VIT/IRON FUM/FOLIC AC 27MG-0.8MG
1 TABLET ORAL DAILY
COMMUNITY

## 2020-03-10 ASSESSMENT — MIFFLIN-ST. JEOR: SCORE: 1283.39

## 2020-03-11 LAB
BACTERIA SPEC CULT: NORMAL
HBV SURFACE AG SERPL QL IA: NONREACTIVE
HIV 1+2 AB+HIV1 P24 AG SERPL QL IA: NONREACTIVE
Lab: NORMAL
RUBV IGG SERPL IA-ACNC: 31 IU/ML
SPECIMEN SOURCE: NORMAL
T PALLIDUM AB SER QL: NONREACTIVE

## 2020-03-15 ENCOUNTER — HEALTH MAINTENANCE LETTER (OUTPATIENT)
Age: 25
End: 2020-03-15

## 2020-03-20 ENCOUNTER — TELEPHONE (OUTPATIENT)
Dept: FAMILY MEDICINE | Facility: OTHER | Age: 25
End: 2020-03-20

## 2020-03-20 NOTE — TELEPHONE ENCOUNTER
Spoke to radiology, informed of message.  Alicia Cabrera CMA ([facility])

## 2020-03-20 NOTE — TELEPHONE ENCOUNTER
Radiology calling. Per radiologist requesting to schedule patients ultrasound out two weeks. Please advise   We can call [phone] to speak to radiology

## 2020-04-13 ENCOUNTER — PRENATAL OFFICE VISIT (OUTPATIENT)
Dept: FAMILY MEDICINE | Facility: OTHER | Age: 25
End: 2020-04-13
Payer: COMMERCIAL

## 2020-04-13 VITALS
HEART RATE: 107 BPM | DIASTOLIC BLOOD PRESSURE: 72 MMHG | BODY MASS INDEX: 23.96 KG/M2 | WEIGHT: 131 LBS | OXYGEN SATURATION: 99 % | SYSTOLIC BLOOD PRESSURE: 110 MMHG | TEMPERATURE: 98.6 F | RESPIRATION RATE: 14 BRPM

## 2020-04-13 DIAGNOSIS — Z98.891 S/P CESAREAN SECTION: ICD-10-CM

## 2020-04-13 DIAGNOSIS — O26.899 RH NEGATIVE STATE IN ANTEPARTUM PERIOD, UNSPECIFIED TRIMESTER: ICD-10-CM

## 2020-04-13 DIAGNOSIS — Z67.91 RH NEGATIVE STATE IN ANTEPARTUM PERIOD, UNSPECIFIED TRIMESTER: ICD-10-CM

## 2020-04-13 DIAGNOSIS — O09.899 SUPERVISION OF OTHER HIGH RISK PREGNANCY, ANTEPARTUM: Primary | ICD-10-CM

## 2020-04-13 PROCEDURE — 99207 ZZC FIRST OB VISIT: CPT | Performed by: FAMILY MEDICINE

## 2020-04-13 ASSESSMENT — PAIN SCALES - GENERAL: PAINLEVEL: NO PAIN (0)

## 2020-04-13 NOTE — PATIENT INSTRUCTIONS
"Thank you for visiting [facility]    Let us know if any problems.    We'll see what your ultrasound says about your due date.      Contact us or return if questions or concerns.     Have a nice day!    Dr. Basilio     No follow-ups on file.      If you had imaging scheduled please refer to your radiology prep sheet.      If you need medication refills, please contact your pharmacy 3 days before your prescriptions runs out or download the [facility] cici for your smart phone.   If you are out of refills, your pharmacy will contact contact the clinic.    Contact us or return if questions or concerns.     -Your [facility] Care Team:    MD Sho Hooks, NADER Jules PA-C Elizabeth \"Sharita\" JERICHO Palacios CNP, APRN, AKUA Barclay APRN, CNP  Jasper Smith, RN, BSN       General information about your   [facility]      Clinic hours:     Lab hours:  Phone [phone]  Monday 7:30 am-7 pm    Monday 8:30 am-6:30 pm  Tuesday-Friday 7:30 am-5 pm   Tuesday-Friday 8:30 am-4:30 pm    Pharmacy hours:  Phone [phone]  Monday 8:30 am-7pm  Tuesday-Friday 8:30am-6 pm                                     Mychart assistance [phone]    We would like to hear from you, how was your visit today?    Dionne Rodriguez  Patient Information Supervisor   Patient Care Supervisor  [facility], and [facility], and [facility]            "

## 2020-04-13 NOTE — PROGRESS NOTES
Deanna Leahy is a 25 year old  who presents to the clinic for an new ob visit.         Estimated Date of Delivery: 2020  Reviewed nurse intake visit on 3/10/20  Concerns: None  HPI   Patient's previous  was due to breech presentation.  Discussed option of  versus repeat .  Patient indicated a desire to plan on repeat  section.    Patient Active Problem List   Diagnosis     Cystic acne     Ingrown toenail     Contraception     CARDIOVASCULAR SCREENING; LDL GOAL LESS THAN 160     Iron deficiency anemia, unspecified iron deficiency anemia type     Gastroesophageal reflux disease, esophagitis presence not specified     Rh negative state in antepartum period, unspecified trimester     Acquired lactose intolerance     Supervision of other high risk pregnancy, antepartum     S/P  section     Past Medical History:   Diagnosis Date     Supervision of high-risk pregnancy 2018     Past Surgical History:   Procedure Laterality Date      SECTION N/A 2018    Procedure:  SECTION;  multi select  Delivery;  Surgeon: Douglas Cardenas MD;  Location:  L+D      TOOTH EXTRACTION W/FORCEP       Current Outpatient Medications   Medication Sig Dispense Refill     order for DME Equipment being ordered:Breast pump (Patient not taking: Reported on 2019) 1 Device 0     Prenatal Vit-Fe Fumarate-FA (PRENATAL MULTIVITAMIN W/IRON) 27-0.8 MG tablet Take 1 tablet by mouth daily         ====================================================  PERSONAL/SOCIAL HISTORY  Social History     Socioeconomic History     Marital status:      Spouse name: Not on file     Number of children: Not on file     Years of education: Not on file     Highest education level: Not on file   Occupational History     Not on file   Social Needs     Financial resource strain: Not on file     Food insecurity     Worry: Not on file     Inability: Not on file      Transportation needs     Medical: Not on file     Non-medical: Not on file   Tobacco Use     Smoking status: Never Smoker     Smokeless tobacco: Never Used   Substance and Sexual Activity     Alcohol use: No     Drug use: No     Sexual activity: Yes     Partners: Male     Birth control/protection: Pill   Lifestyle     Physical activity     Days per week: Not on file     Minutes per session: Not on file     Stress: Not on file   Relationships     Social connections     Talks on phone: Not on file     Gets together: Not on file     Attends Nondenominational service: Not on file     Active member of club or organization: Not on file     Attends meetings of clubs or organizations: Not on file     Relationship status: Not on file     Intimate partner violence     Fear of current or ex partner: Not on file     Emotionally abused: Not on file     Physically abused: Not on file     Forced sexual activity: Not on file   Other Topics Concern     Parent/sibling w/ CABG, MI or angioplasty before 65F 55M? No   Social History Narrative    3/2020  Lives in [place] with , Claudio and daughter, Sona.   No smokers in the household.  No concerns about domestic violence.  Has two indoor cats.  Aware of toxoplasmosis precautions.  Deanna is a stay-at-home mom  Claudio is a .     =====================================================   REVIEW OF SYSTEMS  CONSTITUTIONAL: NEGATIVE for fever, chills, change in weight  INTEGUMENTARU/SKIN: NEGATIVE for worrisome rashes, moles or lesions  EYES: NEGATIVE for vision changes or irritation  ENT: NEGATIVE for ear, mouth and throat problems  RESP: NEGATIVE for significant cough or SOB  BREAST: NEGATIVE for masses, tenderness or discharge  CV: NEGATIVE for chest pain, palpitations or peripheral edema  GI: NEGATIVE for nausea, abdominal pain, heartburn, or change in bowel habits  : NEGATIVE for unusual urinary or vaginal symptoms. Periods are regular.  MUSCULOSKELETAL: NEGATIVE for  significant arthralgias or myalgia  NEURO: NEGATIVE for weakness, dizziness or paresthesias  PSYCHIATRIC: NEGATIVE for changes in mood or affect  ====================================================  PHYSICAL EXAM:  /72   Pulse 107   Temp 98.6  F (37  C) (Temporal)   Resp 14   Wt 59.4 kg (131 lb)   LMP 2020 (Exact Date)   SpO2 99%   BMI 23.96 kg/m          GENERAL:  Pleasant pregnant female, alert, well groomed.  SKIN:  Warm and dry, without lesions or rashes  HEAD: Symmetrical features.  EYES:  PERRLA,   MOUTH:  Buccal mucosa pink, moist without lesions.    NECK:  Thyroid without enlargement and nodules.  Lymph nodes not palpable.   LUNGS:  Clear to auscultation.  BREAST:  Declined.  HEART:  RRR without murmur.  ABDOMEN: Soft without masses , tenderness or organomegaly.  No CVA tenderness. Well healed scar from  section. FHT not able to be auscultated today.  Bedside ultrasound did show fetal heart activity.  MUSCULOSKELETAL:  Full range of motion  EXTREMITIES:  No edema. No significant varicosities.   GENITALIA: Patient declined genital exam.  PELVIS:   Not examined due to patient request.  Patient is planning on repeat .    =========================================    ASSESSMENT/PLAN      ICD-10-CM    1. Supervision of other high risk pregnancy, antepartum  O09.899    2. S/P  section  Z98.891    3. Rh negative state in antepartum period, unspecified trimester  O26.899     Z67.91       1.  Discussed how typical prenatal care has been adjusted due to current COVID-19 recommendations.  We will plan on virtual visit to follow-up in 4 weeks.  Patient will be getting first trimester ultrasound in approximately 2 weeks.  We did not discuss additional noninvasive pregnancy testing today.  We will need to discuss this at her next visit.  2.  We did discuss possible trial of labor after  versus repeat  section.  Patient is currently leaning towards repeat   section.  She indicates a desire to only have 2 children.  3.  Discussed the need for RhoGam with any significant bleeding or significant abdominal trauma during pregnancy.  We will also plan on RhoGam at 28 weeks.      Portions of this note were completed using Dragon dictation software.  Although reviewed, there may be typographical and other inadvertent errors that remain.        RECOMMENDED WEIGHT GAIN: 15-25 lbs.  Instructed on best evidence for: weight gain for her BMI for pregnancy; healthy diet and foods to avoid; exercise and activity during pregnancy;avoiding exposure to toxoplasmosis; and maintenance of a generally healthy lifestyle.   Discussed the harms, benefits, side effects and alternative therapies for current prescribed and OTC medications.

## 2020-04-30 ENCOUNTER — HOSPITAL ENCOUNTER (OUTPATIENT)
Dept: ULTRASOUND IMAGING | Facility: CLINIC | Age: 25
Discharge: HOME | End: 2020-04-30
Attending: FAMILY MEDICINE | Admitting: FAMILY MEDICINE
Payer: COMMERCIAL

## 2020-04-30 DIAGNOSIS — Z34.90 SUPERVISION OF NORMAL PREGNANCY: ICD-10-CM

## 2020-04-30 PROCEDURE — 76801 OB US < 14 WKS SINGLE FETUS: CPT

## 2020-04-30 NOTE — RESULT ENCOUNTER NOTE
Deanna,    Ultrasound looks good.  Let me know if any issues.    Have a nice day!    Dr. Basilio

## 2020-05-11 ENCOUNTER — VIRTUAL VISIT (OUTPATIENT)
Dept: FAMILY MEDICINE | Facility: OTHER | Age: 25
End: 2020-05-11
Payer: COMMERCIAL

## 2020-05-11 ENCOUNTER — TELEPHONE (OUTPATIENT)
Dept: FAMILY MEDICINE | Facility: OTHER | Age: 25
End: 2020-05-11

## 2020-05-11 DIAGNOSIS — Z98.891 S/P CESAREAN SECTION: ICD-10-CM

## 2020-05-11 DIAGNOSIS — Z67.91 RH NEGATIVE STATE IN ANTEPARTUM PERIOD, UNSPECIFIED TRIMESTER: ICD-10-CM

## 2020-05-11 DIAGNOSIS — O26.899 RH NEGATIVE STATE IN ANTEPARTUM PERIOD, UNSPECIFIED TRIMESTER: ICD-10-CM

## 2020-05-11 DIAGNOSIS — O09.899 SUPERVISION OF OTHER HIGH RISK PREGNANCY, ANTEPARTUM: Primary | ICD-10-CM

## 2020-05-11 PROCEDURE — 99207 ZZC COMPLICATED OB VISIT: CPT | Mod: 95 | Performed by: FAMILY MEDICINE

## 2020-05-11 NOTE — PROGRESS NOTES
"Deanna Leahy is a 25 year old female who is being evaluated via a billable telephone visit.      The patient has been notified of following:     \"This telephone visit will be conducted via a call between you and your physician/provider. We have found that certain health care needs can be provided without the need for a physical exam.  This service lets us provide the care you need with a short phone conversation.  If a prescription is necessary we can send it directly to your pharmacy.  If lab work is needed we can place an order for that and you can then stop by our lab to have the test done at a later time.    Telephone visits are billed at different rates depending on your insurance coverage. During this emergency period, for some insurers they may be billed the same as an in-person visit.  Please reach out to your insurance provider with any questions.    If during the course of the call the physician/provider feels a telephone visit is not appropriate, you will not be charged for this service.\"    Patient has given verbal consent for Telephone visit?  Yes    What phone number would you like to be contacted at? [phone]    How would you like to obtain your AVS? Tracy Espinal     Deanna Leahy is a 25 year old female who presents to clinic today for the following health issues:    HPI  OB follow up 14 weeks  No concerns.  Had been having daily emesis, but this has resolved.  She had been staying hydrated.  Starting to feel some flutters at times.    Prenatal flowsheet information is reviewed.  Discussed triple/quad screening.  She declines testing at this time.  Reportable signs and symptoms discussed.         Current Outpatient Medications   Medication Sig Dispense Refill     order for DME Equipment being ordered:Breast pump 1 Device 0     Prenatal Vit-Fe Fumarate-FA (PRENATAL MULTIVITAMIN W/IRON) 27-0.8 MG tablet Take 1 tablet by mouth daily       Allergies   Allergen Reactions     No " Known Drug Allergies      Recent Labs   Lab Test 19  2323 18  1119 17  0928 17  1001   ALT  --  20  --   --    CR 0.57 0.60  --   --    GFRESTIMATED >90 >90  --   --    GFRESTBLACK >90 >90  --   --    POTASSIUM 4.0 4.3  --   --    TSH  --   --  2.48 1.76      BP Readings from Last 3 Encounters:   20 110/72   10/16/19 102/68   10/11/19 108/66    Wt Readings from Last 3 Encounters:   20 59.4 kg (131 lb)   03/10/20 58.5 kg (129 lb)   10/16/19 57.2 kg (126 lb)                    Reviewed and updated as needed this visit by Provider         Review of Systems   Constitutional, HEENT, cardiovascular, pulmonary, gi and gu systems are negative, except as otherwise noted.       Objective   Reported vitals:  LMP 2020 (Exact Date)      PSYCH: Alert and oriented times 3; coherent speech, normal   rate and volume, able to articulate logical thoughts, able   to abstract reason, no tangential thoughts, no hallucinations   or delusions  Her affect is normal  RESP: No cough, no audible wheezing, able to talk in full sentences  Remainder of exam unable to be completed due to telephone visits    Diagnostic Test Results:  Labs reviewed in Epic        Assessment/Plan:    ICD-10-CM    1. Supervision of other high risk pregnancy, antepartum  O09.899    2. S/P  section  Z98.891    3. Rh negative state in antepartum period, unspecified trimester  O26.899     Z67.91    Continue normal  Antepartum cares.  Patient declines additional screening at this time.  We will plan scheduled anatomic survey via ultrasound around 20 weeks.  Follow-up in approximately 4 weeks.    Portions of this note were completed using Dragon dictation software.  Although reviewed, there may be typographical and other inadvertent errors that remain.     This virtual visit was performed in an attempt to minimize potential patient exposures in clinic during the COVID-19 pandemic.         Return in about 4 weeks (around  6/8/2020) for E-visit, video, or phone visit.      Phone call duration:  5 minutes    Romulo Basilio MD, MD

## 2020-05-11 NOTE — PATIENT INSTRUCTIONS
"Thank you for visiting [facility]    Continue your normal OB care.    If you decide you do want additional screening for neural tube defects, spina bifida, or chromosomal abnormalities, let me know.    Let us get your ultrasound scheduled in 5 to 6 weeks.    Let me know if any other problems.    Contact us or return if questions or concerns.     Have a nice day!    Dr. Basilio     Return in about 4 weeks (around 6/8/2020) for E-visit, video, or phone visit.      If you had imaging scheduled please refer to your radiology prep sheet.      If you need medication refills, please contact your pharmacy 3 days before your prescriptions runs out or download the [facility] cici for your smart phone.   If you are out of refills, your pharmacy will contact contact the clinic.    Contact us or return if questions or concerns.     -Your [facility] Care Team:    MD Sho Hooks PA-C Joel De Haan, PA-C Anna Niesen, PA-C Elizabeth \"Shartia\" JERICHO Palacios CNP, APRN, JERICHO Kinney, AKUA Smith, RN, BSN       General information about your   [facility]      Clinic hours:     Lab hours:  Phone [phone]  Monday 7:30 am-7 pm    Monday 8:30 am-6:30 pm  Tuesday-Friday 7:30 am-5 pm   Tuesday-Friday 8:30 am-4:30 pm    Pharmacy hours:  Phone [phone]  Monday 8:30 am-7pm  Tuesday-Friday 8:30am-6 pm                                     Mychart assistance [phone]    We would like to hear from you, how was your visit today?    Dionne Rodriguez  Patient Information Supervisor   Patient Care Supervisor  [facility], and [facility], and [facility]            "

## 2020-06-09 NOTE — PROGRESS NOTES
"Deanna Leahy is a 25 year old female who is being evaluated via a billable telephone visit.      The patient has been notified of following:     \"This telephone visit will be conducted via a call between you and your physician/provider. We have found that certain health care needs can be provided without the need for a physical exam.  This service lets us provide the care you need with a short phone conversation.  If a prescription is necessary we can send it directly to your pharmacy.  If lab work is needed we can place an order for that and you can then stop by our lab to have the test done at a later time.    Telephone visits are billed at different rates depending on your insurance coverage. During this emergency period, for some insurers they may be billed the same as an in-person visit.  Please reach out to your insurance provider with any questions.    If during the course of the call the physician/provider feels a telephone visit is not appropriate, you will not be charged for this service.\"    Patient has given verbal consent for Telephone visit?  Yes    What phone number would you like to be contacted at? [phone]    How would you like to obtain your AVS? Tracy Espinal     Deanna Leahy is a 25 year old female who presents via phone visit today for the following health issues:    HPI  Pt has changed her mind, and is interested in trying for a vaginal delivery.  Discussed TOLAC requirements with her.  Will submit form.    Concerns today: None  No nausea/vomiting. No heartburn.  No vaginal bleeding, no contractions/severe cramping, no leakage of fluid.  No vaginal discharge. No dysuria  Discussed  screening.  She declines testing at this time.  Reportable signs and symptoms discussed.  20-week us next week.        Romulo Basilio MD, MD      Current Outpatient Medications   Medication Sig Dispense Refill     order for DME Equipment being ordered:Breast pump 1 Device 0 "     Prenatal Vit-Fe Fumarate-FA (PRENATAL MULTIVITAMIN W/IRON) 27-0.8 MG tablet Take 1 tablet by mouth daily       Allergies   Allergen Reactions     No Known Drug Allergies      Recent Labs   Lab Test 19  2323 18  1119 17  0928 17  1001   ALT  --  20  --   --    CR 0.57 0.60  --   --    GFRESTIMATED >90 >90  --   --    GFRESTBLACK >90 >90  --   --    POTASSIUM 4.0 4.3  --   --    TSH  --   --  2.48 1.76      BP Readings from Last 3 Encounters:   20 110/72   10/16/19 102/68   10/11/19 108/66    Wt Readings from Last 3 Encounters:   20 59.4 kg (131 lb)   03/10/20 58.5 kg (129 lb)   10/16/19 57.2 kg (126 lb)                    Reviewed and updated as needed this visit by Provider         Review of Systems   Constitutional, HEENT, cardiovascular, pulmonary, gi and gu systems are negative, except as otherwise noted.       Objective   Reported vitals:  LMP 2020 (Exact Date)      PSYCH: Alert and oriented times 3; coherent speech, normal   rate and volume, able to articulate logical thoughts, able   to abstract reason, no tangential thoughts, no hallucinations   or delusions  Her affect is normal  RESP: No cough, no audible wheezing, able to talk in full sentences  Remainder of exam unable to be completed due to telephone visits    Diagnostic Test Results:  Labs reviewed in Epic        Assessment/Plan:    ICD-10-CM    1. Supervision of other high risk pregnancy, antepartum  O09.899    2. S/P  section  Z98.891    3. Rh negative state in antepartum period, unspecified trimester  O26.899     Z67.91       Discussed possible trial of labor after  and associated risks and benefits.  Patient expressed understanding and desire to proceed with planning for trial of labor after .  TOLAC form filled out.  Discussed hospital policy and procedure for this.  Follow-up in 4 weeks.  OB ultrasound soon.    Portions of this note were completed using Dragon dictation  software.  Although reviewed, there may be typographical and other inadvertent errors that remain.     This virtual visit was performed in an attempt to minimize potential patient exposures in clinic during the COVID-19 pandemic.       Return in about 4 weeks (around 7/9/2020) for E-visit, video, or phone visit.      Phone call duration:  13 minutes    Romulo Basilio MD, MD

## 2020-06-11 ENCOUNTER — VIRTUAL VISIT (OUTPATIENT)
Dept: FAMILY MEDICINE | Facility: OTHER | Age: 25
End: 2020-06-11
Payer: COMMERCIAL

## 2020-06-11 DIAGNOSIS — Z67.91 RH NEGATIVE STATE IN ANTEPARTUM PERIOD, UNSPECIFIED TRIMESTER: ICD-10-CM

## 2020-06-11 DIAGNOSIS — O09.899 SUPERVISION OF OTHER HIGH RISK PREGNANCY, ANTEPARTUM: Primary | ICD-10-CM

## 2020-06-11 DIAGNOSIS — O26.899 RH NEGATIVE STATE IN ANTEPARTUM PERIOD, UNSPECIFIED TRIMESTER: ICD-10-CM

## 2020-06-11 DIAGNOSIS — Z98.891 S/P CESAREAN SECTION: ICD-10-CM

## 2020-06-11 PROCEDURE — 99207 ZZC COMPLICATED OB VISIT: CPT | Performed by: FAMILY MEDICINE

## 2020-06-11 NOTE — PROGRESS NOTES
"  Concerns today: None  {OB [id number]::\"No nausea/vomiting. No heartburn.\",\"No vaginal bleeding, no contractions/severe cramping, no leakage of fluid.\",\"No vaginal discharge. No dysuria\",\"No headache, vision changes, lower extremity swelling, upper abdominal pain, chest pain, shortness of breath. \"}      Romulo Basilio MD, MD    "

## 2020-06-15 ENCOUNTER — HOSPITAL ENCOUNTER (OUTPATIENT)
Dept: ULTRASOUND IMAGING | Facility: CLINIC | Age: 25
Discharge: HOME | End: 2020-06-15
Attending: FAMILY MEDICINE | Admitting: FAMILY MEDICINE
Payer: COMMERCIAL

## 2020-06-15 DIAGNOSIS — Z98.891 S/P CESAREAN SECTION: ICD-10-CM

## 2020-06-15 DIAGNOSIS — O26.899 RH NEGATIVE STATE IN ANTEPARTUM PERIOD, UNSPECIFIED TRIMESTER: ICD-10-CM

## 2020-06-15 DIAGNOSIS — O09.899 SUPERVISION OF OTHER HIGH RISK PREGNANCY, ANTEPARTUM: ICD-10-CM

## 2020-06-15 DIAGNOSIS — Z67.91 RH NEGATIVE STATE IN ANTEPARTUM PERIOD, UNSPECIFIED TRIMESTER: ICD-10-CM

## 2020-06-15 PROCEDURE — 76805 OB US >/= 14 WKS SNGL FETUS: CPT

## 2020-06-15 NOTE — RESULT ENCOUNTER NOTE
Deanna,    Ultrasound looks good, but part of the heart wasn't seen well.  We should probably repeat an ultrasound in 2-4 weeks to ensure this is normal as well.    Have a nice day!    Dr. Basilio

## 2020-06-29 ENCOUNTER — TELEPHONE (OUTPATIENT)
Dept: FAMILY MEDICINE | Facility: OTHER | Age: 25
End: 2020-06-29

## 2020-06-29 DIAGNOSIS — Z67.91 RH NEGATIVE STATE IN ANTEPARTUM PERIOD, UNSPECIFIED TRIMESTER: ICD-10-CM

## 2020-06-29 DIAGNOSIS — O09.899 SUPERVISION OF OTHER HIGH RISK PREGNANCY, ANTEPARTUM: Primary | ICD-10-CM

## 2020-06-29 DIAGNOSIS — O26.899 RH NEGATIVE STATE IN ANTEPARTUM PERIOD, UNSPECIFIED TRIMESTER: ICD-10-CM

## 2020-06-29 DIAGNOSIS — Z98.891 S/P CESAREAN SECTION: ICD-10-CM

## 2020-06-29 NOTE — TELEPHONE ENCOUNTER
Received Mychart from patient that went to the customer services pool ( will not be in her chart) I have pasted her message below. Would you be willing to place imaging orders so patient can schedule Follow-up US.          Topic: Procedural Question     Good morning, I am trying to schedule my ultrasound appointment to get the retake pictures for baby's heart, but the  says I need an order put in for my provider. Is there any way that order can be sent again to [facility] Imaging scheduling?     Thank you,  Deanna

## 2020-06-29 NOTE — TELEPHONE ENCOUNTER
Left message for patient to return call to clinic. When call is returned please see message below and help patient get to imaging to schedule.     Fredy Nguyen,

## 2020-06-30 NOTE — TELEPHONE ENCOUNTER
Patient notified that order has been placed, she will call to puma  Closing encounter  Krista Walker RT (R)

## 2020-07-06 ENCOUNTER — HOSPITAL ENCOUNTER (OUTPATIENT)
Dept: ULTRASOUND IMAGING | Facility: CLINIC | Age: 25
Discharge: HOME | End: 2020-07-06
Attending: FAMILY MEDICINE | Admitting: FAMILY MEDICINE
Payer: COMMERCIAL

## 2020-07-06 DIAGNOSIS — Z98.891 S/P CESAREAN SECTION: ICD-10-CM

## 2020-07-06 DIAGNOSIS — O26.899 RH NEGATIVE STATE IN ANTEPARTUM PERIOD, UNSPECIFIED TRIMESTER: ICD-10-CM

## 2020-07-06 DIAGNOSIS — Z67.91 RH NEGATIVE STATE IN ANTEPARTUM PERIOD, UNSPECIFIED TRIMESTER: ICD-10-CM

## 2020-07-06 DIAGNOSIS — O09.899 SUPERVISION OF OTHER HIGH RISK PREGNANCY, ANTEPARTUM: ICD-10-CM

## 2020-07-06 PROCEDURE — 76816 OB US FOLLOW-UP PER FETUS: CPT

## 2020-07-07 NOTE — RESULT ENCOUNTER NOTE
Deanna,    Your ultrasound looks good.  Let me know if any issues.    Have a nice day!    Dr. Basilio

## 2020-07-30 NOTE — PROGRESS NOTES
Concerns: no concerns   Wondering about her TOLAC form.  Still awaiting final results.  Discussed some adjustments to care related to this and COVID.    No vaginal bleeding, no contractions, no leakage of fluid  No nausea/vomiting. No heartburn  No vaginal discharge. No dysuria.   No headache, vision changes, lower extremity swelling, upper abdominal pain, chest pain, shortness of breath  Glucola done today.  Rhogam today.  Pt understands she might need another dose prior to delivery.  Tdap planned next visit  Discussed PTL, PROM, and when to call or come in.  Normal anatomy ultrasound.  RTC 4 weeks.  GTT and labs today       Romulo Basilio MD

## 2020-08-04 ENCOUNTER — PRENATAL OFFICE VISIT (OUTPATIENT)
Dept: FAMILY MEDICINE | Facility: OTHER | Age: 25
End: 2020-08-04
Payer: COMMERCIAL

## 2020-08-04 VITALS
SYSTOLIC BLOOD PRESSURE: 102 MMHG | OXYGEN SATURATION: 100 % | RESPIRATION RATE: 16 BRPM | TEMPERATURE: 98.1 F | HEIGHT: 62 IN | HEART RATE: 90 BPM | BODY MASS INDEX: 26.31 KG/M2 | DIASTOLIC BLOOD PRESSURE: 64 MMHG | WEIGHT: 143 LBS

## 2020-08-04 DIAGNOSIS — Z98.891 S/P CESAREAN SECTION: ICD-10-CM

## 2020-08-04 DIAGNOSIS — O26.899 RH NEGATIVE STATE IN ANTEPARTUM PERIOD, UNSPECIFIED TRIMESTER: ICD-10-CM

## 2020-08-04 DIAGNOSIS — Z67.91 RH NEGATIVE STATE IN ANTEPARTUM PERIOD, UNSPECIFIED TRIMESTER: ICD-10-CM

## 2020-08-04 DIAGNOSIS — O09.899 SUPERVISION OF OTHER HIGH RISK PREGNANCY, ANTEPARTUM: Primary | ICD-10-CM

## 2020-08-04 DIAGNOSIS — O34.219 PATIENT DECLINES VAGINAL BIRTH AFTER CESAREAN SECTION (VBAC): ICD-10-CM

## 2020-08-04 LAB
ABO + RH BLD: NORMAL
ABO + RH BLD: NORMAL
BLD GP AB SCN SERPL QL: NORMAL
BLOOD BANK CMNT PATIENT-IMP: NORMAL
GLUCOSE 1H P 50 G GLC PO SERPL-MCNC: 101 MG/DL (ref 60–129)
HGB BLD-MCNC: 12.5 G/DL (ref 11.7–15.7)
SPECIMEN EXP DATE BLD: NORMAL

## 2020-08-04 PROCEDURE — 82950 GLUCOSE TEST: CPT | Performed by: FAMILY MEDICINE

## 2020-08-04 PROCEDURE — 86901 BLOOD TYPING SEROLOGIC RH(D): CPT | Performed by: FAMILY MEDICINE

## 2020-08-04 PROCEDURE — 86850 RBC ANTIBODY SCREEN: CPT | Performed by: FAMILY MEDICINE

## 2020-08-04 PROCEDURE — 86900 BLOOD TYPING SEROLOGIC ABO: CPT | Performed by: FAMILY MEDICINE

## 2020-08-04 PROCEDURE — 86780 TREPONEMA PALLIDUM: CPT | Performed by: FAMILY MEDICINE

## 2020-08-04 PROCEDURE — 00000218 ZZHCL STATISTIC OBHBG - HEMOGLOBIN: Performed by: FAMILY MEDICINE

## 2020-08-04 PROCEDURE — 99207 ZZC COMPLICATED OB VISIT: CPT | Performed by: FAMILY MEDICINE

## 2020-08-04 PROCEDURE — 36415 COLL VENOUS BLD VENIPUNCTURE: CPT | Performed by: FAMILY MEDICINE

## 2020-08-04 ASSESSMENT — MIFFLIN-ST. JEOR: SCORE: 1346.89

## 2020-08-04 NOTE — PATIENT INSTRUCTIONS
"Thank you for visiting [facility]    Keep up the good work!    We'll let you know your lab results as soon as we can.     Contact us or return if questions or concerns.     Have a nice day!    Dr. Basilio     No follow-ups on file.      If you had imaging scheduled please refer to your radiology prep sheet.      If you need medication refills, please contact your pharmacy 3 days before your prescriptions runs out or download the [facility] cici for your smart phone.   If you are out of refills, your pharmacy will contact contact the clinic.    Contact us or return if questions or concerns.     -Your [facility] Care Team:    MD Sho Hooks PA-C Joel De Haan, PA-C Anna Niesen, PA-C Elizabeth \"Sharita\" JERICHO Palacios CNP APRN, AKUA Barclay APRN, CNP  Jasper Smith, NACHON, RN, PHN       General information about your   [facility]      Clinic hours:     Lab hours:  Phone [phone]  Monday 7:30 am-7 pm    Monday 8:30 am-6:30 pm  Tuesday-Friday 7:30 am-5 pm   Tuesday-Friday 8:30 am-4:30 pm    Pharmacy hours:  Phone [phone]  Monday 8:30 am-7pm  Tuesday-Friday 8:30am-6 pm                                     Mychart assistance [phone]    We would like to hear from you, how was your visit today?    Dionne Rodriguez  Patient Information Supervisor   Patient Care Supervisor  [facility], and [facility], and [facility]            "

## 2020-08-05 LAB — T PALLIDUM AB SER QL: NONREACTIVE

## 2020-08-24 NOTE — PROGRESS NOTES
"Concerns: patient states \"it feels like he's breech, so wants to check that\"  Waking up frequently.  Wondering about restless legs.  Discussed trying magnesium or iron supplements to see if that helps.    No vaginal bleeding, LOF.  No contractions.  Pt reports adverse reaction to last Tdap. Declines immunization today.  Breech position confirmed by Leopold maneuvers and \"quick-look\" ultrasound.  Reportable signs and symptoms discussed.  Discussed kick counts and fetal movement.  Discussed PTL, PROM, and when to call or come in.  RTC 2 weeks.    Romulo Basilio MD, MD      "

## 2020-08-28 ENCOUNTER — OFFICE VISIT (OUTPATIENT)
Dept: FAMILY MEDICINE | Facility: OTHER | Age: 25
End: 2020-08-28
Payer: COMMERCIAL

## 2020-08-28 VITALS
BODY MASS INDEX: 27.05 KG/M2 | DIASTOLIC BLOOD PRESSURE: 72 MMHG | WEIGHT: 147 LBS | HEIGHT: 62 IN | OXYGEN SATURATION: 100 % | RESPIRATION RATE: 16 BRPM | HEART RATE: 109 BPM | SYSTOLIC BLOOD PRESSURE: 110 MMHG | TEMPERATURE: 97.6 F

## 2020-08-28 DIAGNOSIS — O34.219 DESIRES VBAC (VAGINAL BIRTH AFTER CESAREAN) TRIAL: ICD-10-CM

## 2020-08-28 DIAGNOSIS — O09.899 SUPERVISION OF OTHER HIGH RISK PREGNANCY, ANTEPARTUM: Primary | ICD-10-CM

## 2020-08-28 DIAGNOSIS — Z98.891 S/P CESAREAN SECTION: ICD-10-CM

## 2020-08-28 DIAGNOSIS — Z67.91 RH NEGATIVE STATE IN ANTEPARTUM PERIOD, UNSPECIFIED TRIMESTER: ICD-10-CM

## 2020-08-28 DIAGNOSIS — O26.899 RH NEGATIVE STATE IN ANTEPARTUM PERIOD, UNSPECIFIED TRIMESTER: ICD-10-CM

## 2020-08-28 PROCEDURE — 99207 ZZC COMPLICATED OB VISIT: CPT | Performed by: FAMILY MEDICINE

## 2020-08-28 ASSESSMENT — MIFFLIN-ST. JEOR: SCORE: 1365.04

## 2020-08-28 NOTE — PATIENT INSTRUCTIONS
"Thank you for visiting [facility]    Keep up the good work!    Let us know if problems.    Contact us or return if questions or concerns.     Have a nice day!    Dr. Basilio     No follow-ups on file.      If you had imaging scheduled please refer to your radiology prep sheet.      If you need medication refills, please contact your pharmacy 3 days before your prescriptions runs out or download the [facility] cici for your smart phone.   If you are out of refills, your pharmacy will contact contact the clinic.    Contact us or return if questions or concerns.     -[facility] Care Team:    MD Sho Hooks PA-C Joel De Haan, PA-C Anna Niesen, PA-C Elizabeth \"Sharita\" JERICHO Palacios CNP, APRN, JERICHO Kinney, AKUA Smith, NACHON, RN, PHN                                         [facility] [phone]    We would like to hear from you, how was your visit today?    Dionne Fisher       Patient Information Supervisor     [place], [place], and [facility]             "

## 2020-09-16 NOTE — PROGRESS NOTES
Concerns: he still seems breech.  Will get some pain near her  scar.    If baby stays breech, she feels she'd rather schedule a .  Doesn't want to do a version.  Will refer for   consult in case baby stays breech.  No vaginal bleeding, LOF.  No contractions.  Breech position confirmed by Leopold maneuvers.  Reportable signs and symptoms discussed.  Discussed kick counts and fetal movement.  Discussed PTL, PROM, and when to call or come in.  RTC 2 weeks.    Romulo Basilio MD

## 2020-09-21 ENCOUNTER — PRENATAL OFFICE VISIT (OUTPATIENT)
Dept: FAMILY MEDICINE | Facility: OTHER | Age: 25
End: 2020-09-21
Payer: COMMERCIAL

## 2020-09-21 VITALS
RESPIRATION RATE: 18 BRPM | HEIGHT: 62 IN | HEART RATE: 100 BPM | SYSTOLIC BLOOD PRESSURE: 112 MMHG | WEIGHT: 149.5 LBS | TEMPERATURE: 98 F | OXYGEN SATURATION: 99 % | DIASTOLIC BLOOD PRESSURE: 62 MMHG | BODY MASS INDEX: 27.51 KG/M2

## 2020-09-21 DIAGNOSIS — O26.899 RH NEGATIVE STATE IN ANTEPARTUM PERIOD, UNSPECIFIED TRIMESTER: ICD-10-CM

## 2020-09-21 DIAGNOSIS — Z67.91 RH NEGATIVE STATE IN ANTEPARTUM PERIOD, UNSPECIFIED TRIMESTER: ICD-10-CM

## 2020-09-21 DIAGNOSIS — O09.899 SUPERVISION OF OTHER HIGH RISK PREGNANCY, ANTEPARTUM: Primary | ICD-10-CM

## 2020-09-21 DIAGNOSIS — Z98.891 S/P CESAREAN SECTION: ICD-10-CM

## 2020-09-21 PROCEDURE — 99207 ZZC COMPLICATED OB VISIT: CPT | Performed by: FAMILY MEDICINE

## 2020-09-21 ASSESSMENT — MIFFLIN-ST. JEOR: SCORE: 1376.51

## 2020-09-21 NOTE — PATIENT INSTRUCTIONS
"Thank you for visiting [facility]    Keep up the good work!    Let us know if problems.      See Dr. Gaming for  Your  consult.    Contact us or return if questions or concerns.     Have a nice day!    Dr. Basilio     Return in about 2 weeks (around 10/5/2020) for Recheck.      If you had imaging scheduled please refer to your radiology prep sheet.      If you need medication refills, please contact your pharmacy 3 days before your prescriptions runs out or download the [facility] cici for your smart phone.   If you are out of refills, your pharmacy will contact contact the clinic.    Contact us or return if questions or concerns.     -[facility] Care Team:    MD Sho Hooks PA-C Joel De Haan, PA-C Anna Niesen, PA-C Elizabeth \"Sharita\" JERICHO Palacios CNP  Daina Michael APRN, CNP  Neha Barclay APRN, CNP  Jasper Smith, NACHON, RN, PHN                                         [facility] assistance [phone]    We would like to hear from you, how was your visit today?    Dionne Fisher       Patient Information Supervisor     [place], [place], and [facility]             "

## 2020-09-30 ENCOUNTER — PRENATAL OFFICE VISIT (OUTPATIENT)
Dept: FAMILY MEDICINE | Facility: CLINIC | Age: 25
End: 2020-09-30
Payer: COMMERCIAL

## 2020-09-30 VITALS
HEART RATE: 130 BPM | BODY MASS INDEX: 27.43 KG/M2 | DIASTOLIC BLOOD PRESSURE: 66 MMHG | OXYGEN SATURATION: 100 % | SYSTOLIC BLOOD PRESSURE: 98 MMHG | RESPIRATION RATE: 14 BRPM | WEIGHT: 150 LBS | TEMPERATURE: 97.3 F

## 2020-09-30 DIAGNOSIS — Z11.59 ENCOUNTER FOR SCREENING FOR OTHER VIRAL DISEASES: Primary | ICD-10-CM

## 2020-09-30 DIAGNOSIS — Z34.83 ENCOUNTER FOR SUPERVISION OF OTHER NORMAL PREGNANCY, THIRD TRIMESTER: Primary | ICD-10-CM

## 2020-09-30 PROCEDURE — 99207 ZZC PRENATAL VISIT: CPT | Performed by: FAMILY MEDICINE

## 2020-09-30 RX ORDER — CEFAZOLIN SODIUM 1 G/3ML
1 INJECTION, POWDER, FOR SOLUTION INTRAMUSCULAR; INTRAVENOUS SEE ADMIN INSTRUCTIONS
Status: CANCELLED | OUTPATIENT
Start: 2020-09-30

## 2020-09-30 RX ORDER — SODIUM CHLORIDE, SODIUM LACTATE, POTASSIUM CHLORIDE, CALCIUM CHLORIDE 600; 310; 30; 20 MG/100ML; MG/100ML; MG/100ML; MG/100ML
INJECTION, SOLUTION INTRAVENOUS CONTINUOUS
Status: CANCELLED | OUTPATIENT
Start: 2020-09-30

## 2020-09-30 RX ORDER — CEFAZOLIN SODIUM 2 G/100ML
2 INJECTION, SOLUTION INTRAVENOUS
Status: CANCELLED | OUTPATIENT
Start: 2020-09-30

## 2020-09-30 RX ORDER — CITRIC ACID/SODIUM CITRATE 334-500MG
30 SOLUTION, ORAL ORAL
Status: CANCELLED | OUTPATIENT
Start: 2020-09-30

## 2020-09-30 ASSESSMENT — PAIN SCALES - GENERAL: PAINLEVEL: NO PAIN (0)

## 2020-09-30 NOTE — PROGRESS NOTES
OB consult  Concerns: none   Doing well.  No concerns today.  No vaginal bleeding, LOF.  No contractions.  Discussed kick counts and fetal movement.  Discussed kick counts and fetal movement.  Discussed PTL, PROM, and when to call or come in.  RTC 2 weeks.      Deanna Leahy is in today for a consultation for a repeat .  She is a 25 year old female who is a , and is 34 wks gestation.  The patient was referred to me by Dr. Basilio .    MEDICAL HISTORY  Past Medical History:   Diagnosis Date     Supervision of high-risk pregnancy 2018       SURGICAL HISTORY   Past Surgical History:   Procedure Laterality Date      SECTION N/A 2018    Procedure:  SECTION;  multi select  Delivery;  Surgeon: Douglas Cardenas MD;  Location:  L+D      TOOTH EXTRACTION W/FORCEP         ALLERGIES     Allergies   Allergen Reactions     No Known Drug Allergies        CURRENT MEDICATIONS    Current Outpatient Medications:      Prenatal Vit-Fe Fumarate-FA (PRENATAL MULTIVITAMIN W/IRON) 27-0.8 MG tablet, Take 1 tablet by mouth daily, Disp: , Rfl:      order for DME, Equipment being ordered:Breast pump (Patient not taking: Reported on 2020), Disp: 1 Device, Rfl: 0    Current Facility-Administered Medications:      [Auto Hold] rho(D) immune globulin (RHOGAM) injection 300 mcg, 300 mcg, Intramuscular, Once, Romulo Basilio MD    SOCIAL HISTORY  Social History     Socioeconomic History     Marital status:      Spouse name: Not on file     Number of children: Not on file     Years of education: Not on file     Highest education level: Not on file   Occupational History     Not on file   Social Needs     Financial resource strain: Not on file     Food insecurity     Worry: Not on file     Inability: Not on file     Transportation needs     Medical: Not on file     Non-medical: Not on file   Tobacco Use     Smoking status: Never Smoker     Smokeless tobacco: Never Used    Substance and Sexual Activity     Alcohol use: No     Drug use: No     Sexual activity: Yes     Partners: Male   Lifestyle     Physical activity     Days per week: Not on file     Minutes per session: Not on file     Stress: Not on file   Relationships     Social connections     Talks on phone: Not on file     Gets together: Not on file     Attends Hoahaoism service: Not on file     Active member of club or organization: Not on file     Attends meetings of clubs or organizations: Not on file     Relationship status: Not on file     Intimate partner violence     Fear of current or ex partner: Not on file     Emotionally abused: Not on file     Physically abused: Not on file     Forced sexual activity: Not on file   Other Topics Concern     Parent/sibling w/ CABG, MI or angioplasty before 65F 55M? No   Social History Narrative    3/2020  Lives in [place] with , Claudio and daughter, Sona.   No smokers in the household.  No concerns about domestic violence.  Has two indoor cats.  Aware of toxoplasmosis precautions.  Deanna is a stay-at-home mom  Claudio is a .       FAMILY HISTORY  Family History   Problem Relation Age of Onset     C.A.D. Maternal Grandfather         50's?     Hyperlipidemia Mother      Anemia Mother      Crohn's Disease Father      Cancer Paternal Grandmother         lung     Prostate Cancer Paternal Grandfather      No Known Problems Daughter        IMMUNIZATIONS  Immunization History   Administered Date(s) Administered     DTAP (<7y) 08/30/2000     HepB 01/11/1996, 02/08/1996, 07/11/1996     MMR 01/11/1996, 08/30/2000     Meningococcal (Menactra ) 04/01/2011     Meningococcal (Menomune ) 04/01/2011     OPV, trivalent, live 1995, 1995, 01/11/1996     Poliovirus, inactivated (IPV) 08/30/2000     Rhogam 07/09/2018, 09/25/2018     TDAP Vaccine (Adacel) 03/22/2007, 05/03/2017     Tetramune (DtP/HIB) 1995, 1995, 1995, 1995, 1995,  1995, 1996, 1996     Varicella 2007, 2011       We discussed the potential complications of a repeat  delivery.  We reviewed the entire consent form and the risks involved.  In general with a repeat , the risks are similar to that of a primary but blood loss can be more or less depending on the amount of scarring from her previous surgery. This is also true for potential injury to bowel bladder and other internal organ, depending on the amount of scarring she has from her previous surgery.     The other possible complications arising from repeat  include but are not limited to pain, bleeding, infection, injury to the fetus, injury to the maternal bowel, bladder or other adjacent organs, possible nerve damage or temporary loss of limb function or even temporary paralysis, blood clots in the legs or pelvis or the ultimate risk would be loss of life.  With bleeding, if there is excessive blood loss, there could be the need for transfusion or even the need to do a  hysterectomy.    Regarding blood loss, there can be anywhere from 300 to 3000cc with the average being about 1000cc during a repeat  delivery.  If there is excessive blood loss or hemorrhage with a postpartum bleed or injury to uterine vessels during a  delivery, there may be need for blood transfusion and the inherent risks that come along with that including risk of HIV and hepatitis.  The patient had no further questions and had all of them answered to her satisfaction.    Assesment:  Previous Uterine Scar    Plan:  Repeat Low Transverse Uterine  Section  Scheduled for 10/29/20 at 0730.  The patient is aware that if she should go into active labor prior to the date of her scheduled section and she is beyond 36 wks gestation, that she should present to L&D and we will be notified and her delivery will be done at that time.  She will follow up with her primary physician  for the remainder of her prenatal care and will have her preop physical within 7 days of the date of surgery.  Dr Cardenas will be performing the procedure as I will not be in clinic on the 29th he is aware of the surgery time and date      cc: Dr. Praful Gaming MD, MD

## 2020-10-05 ENCOUNTER — MYC MEDICAL ADVICE (OUTPATIENT)
Dept: FAMILY MEDICINE | Facility: OTHER | Age: 25
End: 2020-10-05

## 2020-10-07 ENCOUNTER — ANESTHESIA (OUTPATIENT)
Dept: OBGYN | Facility: CLINIC | Age: 25
End: 2020-10-07
Payer: COMMERCIAL

## 2020-10-07 ENCOUNTER — HOSPITAL ENCOUNTER (INPATIENT)
Facility: CLINIC | Age: 25
LOS: 2 days | Discharge: HOME | End: 2020-10-09
Attending: OBSTETRICS & GYNECOLOGY | Admitting: OBSTETRICS & GYNECOLOGY
Payer: COMMERCIAL

## 2020-10-07 ENCOUNTER — HOSPITAL ENCOUNTER (INPATIENT)
Facility: CLINIC | Age: 25
LOS: 1 days | Discharge: ANOTHER HOSPITAL | End: 2020-10-07
Attending: FAMILY MEDICINE | Admitting: OBSTETRICS & GYNECOLOGY
Payer: COMMERCIAL

## 2020-10-07 ENCOUNTER — ANESTHESIA EVENT (OUTPATIENT)
Dept: OBGYN | Facility: CLINIC | Age: 25
End: 2020-10-07
Payer: COMMERCIAL

## 2020-10-07 VITALS
BODY MASS INDEX: 27.6 KG/M2 | TEMPERATURE: 97.6 F | DIASTOLIC BLOOD PRESSURE: 70 MMHG | HEIGHT: 62 IN | WEIGHT: 150 LBS | OXYGEN SATURATION: 100 % | RESPIRATION RATE: 18 BRPM | HEART RATE: 109 BPM | SYSTOLIC BLOOD PRESSURE: 108 MMHG

## 2020-10-07 DIAGNOSIS — Z36.89 ENCOUNTER FOR TRIAGE IN PREGNANT PATIENT: ICD-10-CM

## 2020-10-07 DIAGNOSIS — Z98.891 S/P CESAREAN SECTION: Primary | ICD-10-CM

## 2020-10-07 DIAGNOSIS — Z34.83 ENCOUNTER FOR SUPERVISION OF OTHER NORMAL PREGNANCY, THIRD TRIMESTER: ICD-10-CM

## 2020-10-07 DIAGNOSIS — Z36.89 ENCOUNTER FOR TRIAGE IN PREGNANT PATIENT: Primary | ICD-10-CM

## 2020-10-07 LAB
ABO + RH BLD: ABNORMAL
ABO + RH BLD: ABNORMAL
ALBUMIN UR-MCNC: NEGATIVE MG/DL
APPEARANCE UR: ABNORMAL
BASOPHILS # BLD AUTO: 0 10E9/L (ref 0–0.2)
BASOPHILS NFR BLD AUTO: 0.3 %
BILIRUB UR QL STRIP: NEGATIVE
BLD GP AB INVEST PLASRBC-IMP: ABNORMAL
BLD GP AB SCN SERPL QL: ABNORMAL
BLOOD BANK CMNT PATIENT-IMP: ABNORMAL
BLOOD BANK CMNT PATIENT-IMP: ABNORMAL
BLOOD BANK CMNT PATIENT-IMP: NORMAL
COLOR UR AUTO: YELLOW
DIFFERENTIAL METHOD BLD: NORMAL
EOSINOPHIL NFR BLD AUTO: 1 %
ERYTHROCYTE [DISTWIDTH] IN BLOOD BY AUTOMATED COUNT: 11.9 % (ref 10–15)
GLUCOSE UR STRIP-MCNC: NEGATIVE MG/DL
HCT VFR BLD AUTO: 36.3 % (ref 35–47)
HGB BLD-MCNC: 12 G/DL (ref 11.7–15.7)
HGB UR QL STRIP: ABNORMAL
IMM GRANULOCYTES # BLD: 0.1 10E9/L (ref 0–0.4)
IMM GRANULOCYTES NFR BLD: 0.6 %
KETONES UR STRIP-MCNC: NEGATIVE MG/DL
LABORATORY COMMENT REPORT: NORMAL
LEUKOCYTE ESTERASE UR QL STRIP: NEGATIVE
LYMPHOCYTES # BLD AUTO: 1.8 10E9/L (ref 0.8–5.3)
LYMPHOCYTES NFR BLD AUTO: 23.2 %
MCH RBC QN AUTO: 28.1 PG (ref 26.5–33)
MCHC RBC AUTO-ENTMCNC: 33.1 G/DL (ref 31.5–36.5)
MCV RBC AUTO: 85 FL (ref 78–100)
MONOCYTES # BLD AUTO: 0.6 10E9/L (ref 0–1.3)
MONOCYTES NFR BLD AUTO: 7.4 %
NEUTROPHILS # BLD AUTO: 5.3 10E9/L (ref 1.6–8.3)
NEUTROPHILS NFR BLD AUTO: 67.5 %
NITRATE UR QL: NEGATIVE
NRBC # BLD AUTO: 0 10*3/UL
NRBC BLD AUTO-RTO: 0 /100
PH UR STRIP: 7 PH (ref 5–7)
PLATELET # BLD AUTO: 244 10E9/L (ref 150–450)
RBC # BLD AUTO: 4.27 10E12/L (ref 3.8–5.2)
RBC #/AREA URNS AUTO: <1 /HPF (ref 0–2)
RUPTURE OF FETAL MEMBRANES BY ROM PLUS: POSITIVE
SARS-COV-2 RNA SPEC QL NAA+PROBE: NEGATIVE
SARS-COV-2 RNA SPEC QL NAA+PROBE: NORMAL
SOURCE: ABNORMAL
SP GR UR STRIP: 1 (ref 1–1.03)
SPECIMEN EXP DATE BLD: ABNORMAL
SPECIMEN SOURCE: NORMAL
SPECIMEN SOURCE: NORMAL
SQUAMOUS #/AREA URNS AUTO: 7 /HPF (ref 0–1)
T PALLIDUM AB SER QL: NONREACTIVE
UROBILINOGEN UR STRIP-MCNC: 0 MG/DL (ref 0–2)
WBC # BLD AUTO: 7.9 10E9/L (ref 4–11)
WBC #/AREA URNS AUTO: 1 /HPF (ref 0–5)

## 2020-10-07 PROCEDURE — 120N000002 HC R&B MED SURG/OB UMMC

## 2020-10-07 PROCEDURE — 250N000009 HC RX 250

## 2020-10-07 PROCEDURE — 86901 BLOOD TYPING SEROLOGIC RH(D): CPT | Performed by: OBSTETRICS & GYNECOLOGY

## 2020-10-07 PROCEDURE — 59510 CESAREAN DELIVERY: CPT | Performed by: OBSTETRICS & GYNECOLOGY

## 2020-10-07 PROCEDURE — 99222 1ST HOSP IP/OBS MODERATE 55: CPT | Mod: GC | Performed by: OBSTETRICS & GYNECOLOGY

## 2020-10-07 PROCEDURE — 370N000002 HC ANESTHESIA TECHNICAL FEE, EACH ADDTL 15 MIN: Performed by: OBSTETRICS & GYNECOLOGY

## 2020-10-07 PROCEDURE — 250N000009 HC RX 250: Performed by: OBSTETRICS & GYNECOLOGY

## 2020-10-07 PROCEDURE — 250N000011 HC RX IP 250 OP 636: Performed by: OBSTETRICS & GYNECOLOGY

## 2020-10-07 PROCEDURE — 370N000001 HC ANESTHESIA TECHNICAL FEE, 1ST 30 MIN: Performed by: OBSTETRICS & GYNECOLOGY

## 2020-10-07 PROCEDURE — 86900 BLOOD TYPING SEROLOGIC ABO: CPT | Performed by: OBSTETRICS & GYNECOLOGY

## 2020-10-07 PROCEDURE — 360N000021 HC SURGERY LEVEL 3 EA 15 ADDTL MIN: Performed by: OBSTETRICS & GYNECOLOGY

## 2020-10-07 PROCEDURE — 86850 RBC ANTIBODY SCREEN: CPT | Performed by: OBSTETRICS & GYNECOLOGY

## 2020-10-07 PROCEDURE — 761N000003 HC RECOVERY PHASE 1 LEVEL 2 FIRST HR: Performed by: OBSTETRICS & GYNECOLOGY

## 2020-10-07 PROCEDURE — 86780 TREPONEMA PALLIDUM: CPT | Performed by: OBSTETRICS & GYNECOLOGY

## 2020-10-07 PROCEDURE — C9290 INJ, BUPIVACAINE LIPOSOME: HCPCS | Performed by: NURSE ANESTHETIST, CERTIFIED REGISTERED

## 2020-10-07 PROCEDURE — 360N000020 HC SURGERY LEVEL 3 1ST 30 MIN: Performed by: OBSTETRICS & GYNECOLOGY

## 2020-10-07 PROCEDURE — U0003 INFECTIOUS AGENT DETECTION BY NUCLEIC ACID (DNA OR RNA); SEVERE ACUTE RESPIRATORY SYNDROME CORONAVIRUS 2 (SARS-COV-2) (CORONAVIRUS DISEASE [COVID-19]), AMPLIFIED PROBE TECHNIQUE, MAKING USE OF HIGH THROUGHPUT TECHNOLOGIES AS DESCRIBED BY CMS-2020-01-R: HCPCS | Performed by: OBSTETRICS & GYNECOLOGY

## 2020-10-07 PROCEDURE — 250N000009 HC RX 250: Performed by: NURSE ANESTHETIST, CERTIFIED REGISTERED

## 2020-10-07 PROCEDURE — 250N000011 HC RX IP 250 OP 636: Performed by: NURSE ANESTHETIST, CERTIFIED REGISTERED

## 2020-10-07 PROCEDURE — 85025 COMPLETE CBC W/AUTO DIFF WBC: CPT | Performed by: OBSTETRICS & GYNECOLOGY

## 2020-10-07 PROCEDURE — 86870 RBC ANTIBODY IDENTIFICATION: CPT | Performed by: OBSTETRICS & GYNECOLOGY

## 2020-10-07 PROCEDURE — 84112 EVAL AMNIOTIC FLUID PROTEIN: CPT | Performed by: FAMILY MEDICINE

## 2020-10-07 PROCEDURE — 81001 URINALYSIS AUTO W/SCOPE: CPT | Performed by: FAMILY MEDICINE

## 2020-10-07 PROCEDURE — 250N000013 HC RX MED GY IP 250 OP 250 PS 637: Performed by: OBSTETRICS & GYNECOLOGY

## 2020-10-07 PROCEDURE — 271N000001 HC OR GENERAL SUPPLY NON-STERILE: Performed by: OBSTETRICS & GYNECOLOGY

## 2020-10-07 PROCEDURE — 250N000013 HC RX MED GY IP 250 OP 250 PS 637: Performed by: STUDENT IN AN ORGANIZED HEALTH CARE EDUCATION/TRAINING PROGRAM

## 2020-10-07 PROCEDURE — 120N000001 HC R&B MED SURG/OB

## 2020-10-07 PROCEDURE — 272N000001 HC OR GENERAL SUPPLY STERILE: Performed by: OBSTETRICS & GYNECOLOGY

## 2020-10-07 PROCEDURE — 258N000003 HC RX IP 258 OP 636: Performed by: OBSTETRICS & GYNECOLOGY

## 2020-10-07 RX ORDER — MODIFIED LANOLIN
OINTMENT (GRAM) TOPICAL
Status: ON HOLD | DISCHARGE
Start: 2020-10-07 | End: 2020-10-09

## 2020-10-07 RX ORDER — OXYTOCIN/0.9 % SODIUM CHLORIDE 30/500 ML
PLASTIC BAG, INJECTION (ML) INTRAVENOUS CONTINUOUS PRN
Status: DISCONTINUED | OUTPATIENT
Start: 2020-10-07 | End: 2020-10-07

## 2020-10-07 RX ORDER — OXYCODONE HYDROCHLORIDE 5 MG/1
5 TABLET ORAL EVERY 4 HOURS PRN
Refills: 0 | Status: ON HOLD | DISCHARGE
Start: 2020-10-07 | End: 2020-10-09

## 2020-10-07 RX ORDER — HYDROCORTISONE 2.5 %
CREAM (GRAM) TOPICAL 3 TIMES DAILY PRN
Status: ON HOLD | DISCHARGE
Start: 2020-10-07 | End: 2020-10-09

## 2020-10-07 RX ORDER — NALOXONE HYDROCHLORIDE 0.4 MG/ML
.1-.4 INJECTION, SOLUTION INTRAMUSCULAR; INTRAVENOUS; SUBCUTANEOUS
Status: DISCONTINUED | OUTPATIENT
Start: 2020-10-07 | End: 2020-10-07 | Stop reason: HOSPADM

## 2020-10-07 RX ORDER — BISACODYL 10 MG
10 SUPPOSITORY, RECTAL RECTAL DAILY PRN
Status: DISCONTINUED | OUTPATIENT
Start: 2020-10-09 | End: 2020-10-07 | Stop reason: HOSPADM

## 2020-10-07 RX ORDER — NALOXONE HYDROCHLORIDE 0.4 MG/ML
.1-.4 INJECTION, SOLUTION INTRAMUSCULAR; INTRAVENOUS; SUBCUTANEOUS
Status: DISCONTINUED | OUTPATIENT
Start: 2020-10-07 | End: 2020-10-09 | Stop reason: HOSPADM

## 2020-10-07 RX ORDER — CEFAZOLIN SODIUM 1 G/3ML
1 INJECTION, POWDER, FOR SOLUTION INTRAMUSCULAR; INTRAVENOUS SEE ADMIN INSTRUCTIONS
Status: ON HOLD | DISCHARGE
Start: 2020-10-07 | End: 2020-10-09

## 2020-10-07 RX ORDER — ACETAMINOPHEN 325 MG/1
975 TABLET ORAL EVERY 6 HOURS
Status: ON HOLD | DISCHARGE
Start: 2020-10-07 | End: 2020-10-09

## 2020-10-07 RX ORDER — OXYCODONE HYDROCHLORIDE 5 MG/1
5 TABLET ORAL EVERY 4 HOURS PRN
Status: DISCONTINUED | OUTPATIENT
Start: 2020-10-07 | End: 2020-10-09 | Stop reason: HOSPADM

## 2020-10-07 RX ORDER — ALBUTEROL SULFATE 0.83 MG/ML
2.5 SOLUTION RESPIRATORY (INHALATION) EVERY 4 HOURS PRN
Status: DISCONTINUED | OUTPATIENT
Start: 2020-10-07 | End: 2020-10-07 | Stop reason: HOSPADM

## 2020-10-07 RX ORDER — ONDANSETRON 4 MG/1
4 TABLET, ORALLY DISINTEGRATING ORAL EVERY 30 MIN PRN
Status: ON HOLD | DISCHARGE
Start: 2020-10-07 | End: 2020-10-09

## 2020-10-07 RX ORDER — CEFAZOLIN SODIUM 1 G/3ML
1 INJECTION, POWDER, FOR SOLUTION INTRAMUSCULAR; INTRAVENOUS SEE ADMIN INSTRUCTIONS
Status: DISCONTINUED | OUTPATIENT
Start: 2020-10-07 | End: 2020-10-07 | Stop reason: HOSPADM

## 2020-10-07 RX ORDER — OXYCODONE HYDROCHLORIDE 5 MG/1
5 TABLET ORAL EVERY 4 HOURS PRN
Status: DISCONTINUED | OUTPATIENT
Start: 2020-10-07 | End: 2020-10-07 | Stop reason: HOSPADM

## 2020-10-07 RX ORDER — LIDOCAINE 40 MG/G
CREAM TOPICAL
Status: ON HOLD | DISCHARGE
Start: 2020-10-07 | End: 2020-10-09

## 2020-10-07 RX ORDER — METHYLERGONOVINE MALEATE 0.2 MG/ML
200 INJECTION INTRAVENOUS
Status: DISCONTINUED | OUTPATIENT
Start: 2020-10-07 | End: 2020-10-09 | Stop reason: HOSPADM

## 2020-10-07 RX ORDER — OXYTOCIN/0.9 % SODIUM CHLORIDE 30/500 ML
340 PLASTIC BAG, INJECTION (ML) INTRAVENOUS CONTINUOUS PRN
Status: DISCONTINUED | OUTPATIENT
Start: 2020-10-07 | End: 2020-10-09 | Stop reason: HOSPADM

## 2020-10-07 RX ORDER — ACETAMINOPHEN 325 MG/1
975 TABLET ORAL EVERY 6 HOURS
Status: DISCONTINUED | OUTPATIENT
Start: 2020-10-07 | End: 2020-10-07 | Stop reason: HOSPADM

## 2020-10-07 RX ORDER — OXYTOCIN 10 [USP'U]/ML
10 INJECTION, SOLUTION INTRAMUSCULAR; INTRAVENOUS
Status: ON HOLD | DISCHARGE
Start: 2020-10-07 | End: 2020-10-09

## 2020-10-07 RX ORDER — AMOXICILLIN 250 MG
2 CAPSULE ORAL 2 TIMES DAILY
Status: DISCONTINUED | OUTPATIENT
Start: 2020-10-07 | End: 2020-10-07 | Stop reason: HOSPADM

## 2020-10-07 RX ORDER — FENTANYL CITRATE 50 UG/ML
INJECTION, SOLUTION INTRAMUSCULAR; INTRAVENOUS PRN
Status: DISCONTINUED | OUTPATIENT
Start: 2020-10-07 | End: 2020-10-07

## 2020-10-07 RX ORDER — LIDOCAINE 40 MG/G
CREAM TOPICAL
Status: DISCONTINUED | OUTPATIENT
Start: 2020-10-07 | End: 2020-10-07 | Stop reason: HOSPADM

## 2020-10-07 RX ORDER — OXYTOCIN/0.9 % SODIUM CHLORIDE 30/500 ML
100 PLASTIC BAG, INJECTION (ML) INTRAVENOUS CONTINUOUS
Status: DISCONTINUED | OUTPATIENT
Start: 2020-10-07 | End: 2020-10-09 | Stop reason: HOSPADM

## 2020-10-07 RX ORDER — ONDANSETRON 2 MG/ML
INJECTION INTRAMUSCULAR; INTRAVENOUS PRN
Status: DISCONTINUED | OUTPATIENT
Start: 2020-10-07 | End: 2020-10-07

## 2020-10-07 RX ORDER — CITRIC ACID/SODIUM CITRATE 334-500MG
30 SOLUTION, ORAL ORAL
Status: COMPLETED | OUTPATIENT
Start: 2020-10-07 | End: 2020-10-07

## 2020-10-07 RX ORDER — KETOROLAC TROMETHAMINE 30 MG/ML
30 INJECTION, SOLUTION INTRAMUSCULAR; INTRAVENOUS EVERY 6 HOURS
Status: ON HOLD | DISCHARGE
Start: 2020-10-07 | End: 2020-10-09

## 2020-10-07 RX ORDER — MODIFIED LANOLIN
OINTMENT (GRAM) TOPICAL
Status: DISCONTINUED | OUTPATIENT
Start: 2020-10-07 | End: 2020-10-09 | Stop reason: HOSPADM

## 2020-10-07 RX ORDER — SIMETHICONE 80 MG
80 TABLET,CHEWABLE ORAL 4 TIMES DAILY PRN
Status: DISCONTINUED | OUTPATIENT
Start: 2020-10-07 | End: 2020-10-07 | Stop reason: HOSPADM

## 2020-10-07 RX ORDER — FENTANYL CITRATE 50 UG/ML
25-50 INJECTION, SOLUTION INTRAMUSCULAR; INTRAVENOUS
Refills: 0 | Status: ON HOLD | DISCHARGE
Start: 2020-10-07 | End: 2020-10-09

## 2020-10-07 RX ORDER — MODIFIED LANOLIN
OINTMENT (GRAM) TOPICAL
Status: DISCONTINUED | OUTPATIENT
Start: 2020-10-07 | End: 2020-10-07 | Stop reason: HOSPADM

## 2020-10-07 RX ORDER — DEXTROSE, SODIUM CHLORIDE, SODIUM LACTATE, POTASSIUM CHLORIDE, AND CALCIUM CHLORIDE 5; .6; .31; .03; .02 G/100ML; G/100ML; G/100ML; G/100ML; G/100ML
1 INJECTION, SOLUTION INTRAVENOUS CONTINUOUS
Status: ON HOLD | DISCHARGE
Start: 2020-10-07 | End: 2020-10-09

## 2020-10-07 RX ORDER — IBUPROFEN 800 MG/1
800 TABLET, FILM COATED ORAL EVERY 6 HOURS
Status: ON HOLD | DISCHARGE
Start: 2020-10-08 | End: 2020-10-09

## 2020-10-07 RX ORDER — SIMETHICONE 80 MG
80 TABLET,CHEWABLE ORAL 4 TIMES DAILY PRN
Status: ON HOLD | DISCHARGE
Start: 2020-10-07 | End: 2020-10-09

## 2020-10-07 RX ORDER — ONDANSETRON 4 MG/1
4 TABLET, ORALLY DISINTEGRATING ORAL EVERY 30 MIN PRN
Status: DISCONTINUED | OUTPATIENT
Start: 2020-10-07 | End: 2020-10-07 | Stop reason: HOSPADM

## 2020-10-07 RX ORDER — DIPHENHYDRAMINE HYDROCHLORIDE 50 MG/ML
25 INJECTION INTRAMUSCULAR; INTRAVENOUS EVERY 6 HOURS PRN
Status: DISCONTINUED | OUTPATIENT
Start: 2020-10-07 | End: 2020-10-09 | Stop reason: HOSPADM

## 2020-10-07 RX ORDER — HYDROCORTISONE 2.5 %
CREAM (GRAM) TOPICAL 3 TIMES DAILY PRN
Status: DISCONTINUED | OUTPATIENT
Start: 2020-10-07 | End: 2020-10-07 | Stop reason: HOSPADM

## 2020-10-07 RX ORDER — AMOXICILLIN 250 MG
1 CAPSULE ORAL 2 TIMES DAILY
Status: DISCONTINUED | OUTPATIENT
Start: 2020-10-07 | End: 2020-10-07 | Stop reason: HOSPADM

## 2020-10-07 RX ORDER — BISACODYL 10 MG
10 SUPPOSITORY, RECTAL RECTAL DAILY PRN
Status: DISCONTINUED | OUTPATIENT
Start: 2020-10-09 | End: 2020-10-09 | Stop reason: HOSPADM

## 2020-10-07 RX ORDER — SODIUM CHLORIDE, SODIUM LACTATE, POTASSIUM CHLORIDE, CALCIUM CHLORIDE 600; 310; 30; 20 MG/100ML; MG/100ML; MG/100ML; MG/100ML
100 INJECTION, SOLUTION INTRAVENOUS CONTINUOUS
Status: ON HOLD | DISCHARGE
Start: 2020-10-07 | End: 2020-10-09

## 2020-10-07 RX ORDER — AMOXICILLIN 250 MG
1 CAPSULE ORAL 2 TIMES DAILY
Status: DISCONTINUED | OUTPATIENT
Start: 2020-10-07 | End: 2020-10-09 | Stop reason: HOSPADM

## 2020-10-07 RX ORDER — LIDOCAINE HYDROCHLORIDE 10 MG/ML
INJECTION, SOLUTION EPIDURAL; INFILTRATION; INTRACAUDAL; PERINEURAL
Status: COMPLETED
Start: 2020-10-07 | End: 2020-10-07

## 2020-10-07 RX ORDER — MISOPROSTOL 200 UG/1
800 TABLET ORAL
Status: DISCONTINUED | OUTPATIENT
Start: 2020-10-07 | End: 2020-10-09 | Stop reason: HOSPADM

## 2020-10-07 RX ORDER — OXYTOCIN 10 [USP'U]/ML
10 INJECTION, SOLUTION INTRAMUSCULAR; INTRAVENOUS
Status: DISCONTINUED | OUTPATIENT
Start: 2020-10-07 | End: 2020-10-07 | Stop reason: HOSPADM

## 2020-10-07 RX ORDER — LIDOCAINE 40 MG/G
CREAM TOPICAL
Status: DISCONTINUED | OUTPATIENT
Start: 2020-10-07 | End: 2020-10-09 | Stop reason: HOSPADM

## 2020-10-07 RX ORDER — ACETAMINOPHEN 325 MG/1
975 TABLET ORAL ONCE
Status: COMPLETED | OUTPATIENT
Start: 2020-10-07 | End: 2020-10-07

## 2020-10-07 RX ORDER — BUPIVACAINE HYDROCHLORIDE 7.5 MG/ML
INJECTION, SOLUTION INTRASPINAL PRN
Status: DISCONTINUED | OUTPATIENT
Start: 2020-10-07 | End: 2020-10-07

## 2020-10-07 RX ORDER — TRANEXAMIC ACID 10 MG/ML
1 INJECTION, SOLUTION INTRAVENOUS EVERY 30 MIN PRN
Status: DISCONTINUED | OUTPATIENT
Start: 2020-10-07 | End: 2020-10-07 | Stop reason: HOSPADM

## 2020-10-07 RX ORDER — PRENATAL VIT/IRON FUM/FOLIC AC 27MG-0.8MG
1 TABLET ORAL DAILY
Status: DISCONTINUED | OUTPATIENT
Start: 2020-10-07 | End: 2020-10-07 | Stop reason: HOSPADM

## 2020-10-07 RX ORDER — ALBUTEROL SULFATE 0.83 MG/ML
2.5 SOLUTION RESPIRATORY (INHALATION) EVERY 4 HOURS PRN
Status: DISCONTINUED | OUTPATIENT
Start: 2020-10-07 | End: 2020-10-09 | Stop reason: HOSPADM

## 2020-10-07 RX ORDER — SODIUM CHLORIDE, SODIUM LACTATE, POTASSIUM CHLORIDE, CALCIUM CHLORIDE 600; 310; 30; 20 MG/100ML; MG/100ML; MG/100ML; MG/100ML
INJECTION, SOLUTION INTRAVENOUS CONTINUOUS
Status: DISCONTINUED | OUTPATIENT
Start: 2020-10-07 | End: 2020-10-07 | Stop reason: HOSPADM

## 2020-10-07 RX ORDER — ONDANSETRON 2 MG/ML
4 INJECTION INTRAMUSCULAR; INTRAVENOUS EVERY 30 MIN PRN
Status: DISCONTINUED | OUTPATIENT
Start: 2020-10-07 | End: 2020-10-07 | Stop reason: HOSPADM

## 2020-10-07 RX ORDER — IBUPROFEN 800 MG/1
800 TABLET, FILM COATED ORAL EVERY 6 HOURS
Status: DISCONTINUED | OUTPATIENT
Start: 2020-10-08 | End: 2020-10-07 | Stop reason: HOSPADM

## 2020-10-07 RX ORDER — TRANEXAMIC ACID 10 MG/ML
1 INJECTION, SOLUTION INTRAVENOUS EVERY 30 MIN PRN
Status: ON HOLD | DISCHARGE
Start: 2020-10-07 | End: 2020-10-09

## 2020-10-07 RX ORDER — FENTANYL CITRATE 50 UG/ML
25-50 INJECTION, SOLUTION INTRAMUSCULAR; INTRAVENOUS
Status: DISCONTINUED | OUTPATIENT
Start: 2020-10-07 | End: 2020-10-07 | Stop reason: HOSPADM

## 2020-10-07 RX ORDER — AMOXICILLIN 250 MG
2 CAPSULE ORAL 2 TIMES DAILY
Status: DISCONTINUED | OUTPATIENT
Start: 2020-10-07 | End: 2020-10-09 | Stop reason: HOSPADM

## 2020-10-07 RX ORDER — ONDANSETRON 2 MG/ML
4 INJECTION INTRAMUSCULAR; INTRAVENOUS EVERY 6 HOURS PRN
Status: ON HOLD | DISCHARGE
Start: 2020-10-07 | End: 2020-10-09

## 2020-10-07 RX ORDER — FENTANYL CITRATE-0.9 % NACL/PF 10 MCG/ML
PLASTIC BAG, INJECTION (ML) INTRAVENOUS CONTINUOUS PRN
Status: DISCONTINUED | OUTPATIENT
Start: 2020-10-07 | End: 2020-10-07

## 2020-10-07 RX ORDER — ACETAMINOPHEN 325 MG/1
975 TABLET ORAL EVERY 6 HOURS
Status: DISCONTINUED | OUTPATIENT
Start: 2020-10-07 | End: 2020-10-09 | Stop reason: HOSPADM

## 2020-10-07 RX ORDER — KETOROLAC TROMETHAMINE 30 MG/ML
30 INJECTION, SOLUTION INTRAMUSCULAR; INTRAVENOUS EVERY 6 HOURS PRN
Status: DISCONTINUED | OUTPATIENT
Start: 2020-10-07 | End: 2020-10-09 | Stop reason: HOSPADM

## 2020-10-07 RX ORDER — DEXTROSE, SODIUM CHLORIDE, SODIUM LACTATE, POTASSIUM CHLORIDE, AND CALCIUM CHLORIDE 5; .6; .31; .03; .02 G/100ML; G/100ML; G/100ML; G/100ML; G/100ML
INJECTION, SOLUTION INTRAVENOUS CONTINUOUS
Status: DISCONTINUED | OUTPATIENT
Start: 2020-10-07 | End: 2020-10-07 | Stop reason: HOSPADM

## 2020-10-07 RX ORDER — KETOROLAC TROMETHAMINE 30 MG/ML
30 INJECTION, SOLUTION INTRAMUSCULAR; INTRAVENOUS EVERY 6 HOURS
Status: DISCONTINUED | OUTPATIENT
Start: 2020-10-07 | End: 2020-10-07 | Stop reason: HOSPADM

## 2020-10-07 RX ORDER — OXYTOCIN/0.9 % SODIUM CHLORIDE 30/500 ML
100 PLASTIC BAG, INJECTION (ML) INTRAVENOUS CONTINUOUS
Status: ON HOLD | DISCHARGE
Start: 2020-10-07 | End: 2020-10-09

## 2020-10-07 RX ORDER — CARBOPROST TROMETHAMINE 250 UG/ML
250 INJECTION, SOLUTION INTRAMUSCULAR
Status: DISCONTINUED | OUTPATIENT
Start: 2020-10-07 | End: 2020-10-09 | Stop reason: HOSPADM

## 2020-10-07 RX ORDER — OXYTOCIN 10 [USP'U]/ML
10 INJECTION, SOLUTION INTRAMUSCULAR; INTRAVENOUS
Status: DISCONTINUED | OUTPATIENT
Start: 2020-10-07 | End: 2020-10-09 | Stop reason: HOSPADM

## 2020-10-07 RX ORDER — BUPIVACAINE HYDROCHLORIDE 2.5 MG/ML
INJECTION, SOLUTION EPIDURAL; INFILTRATION; INTRACAUDAL PRN
Status: DISCONTINUED | OUTPATIENT
Start: 2020-10-07 | End: 2020-10-07

## 2020-10-07 RX ORDER — ONDANSETRON 2 MG/ML
4 INJECTION INTRAMUSCULAR; INTRAVENOUS EVERY 6 HOURS PRN
Status: DISCONTINUED | OUTPATIENT
Start: 2020-10-07 | End: 2020-10-07 | Stop reason: HOSPADM

## 2020-10-07 RX ORDER — AMOXICILLIN 250 MG
1 CAPSULE ORAL 2 TIMES DAILY
Status: ON HOLD | DISCHARGE
Start: 2020-10-07 | End: 2020-10-09

## 2020-10-07 RX ORDER — HYDROCORTISONE 2.5 %
CREAM (GRAM) TOPICAL 3 TIMES DAILY PRN
Status: DISCONTINUED | OUTPATIENT
Start: 2020-10-07 | End: 2020-10-09 | Stop reason: HOSPADM

## 2020-10-07 RX ORDER — SIMETHICONE 80 MG
80 TABLET,CHEWABLE ORAL 4 TIMES DAILY PRN
Status: DISCONTINUED | OUTPATIENT
Start: 2020-10-07 | End: 2020-10-09 | Stop reason: HOSPADM

## 2020-10-07 RX ORDER — DIPHENHYDRAMINE HCL 25 MG
25 CAPSULE ORAL EVERY 6 HOURS PRN
Status: DISCONTINUED | OUTPATIENT
Start: 2020-10-07 | End: 2020-10-09 | Stop reason: HOSPADM

## 2020-10-07 RX ORDER — CEFAZOLIN SODIUM 2 G/100ML
2 INJECTION, SOLUTION INTRAVENOUS
Status: COMPLETED | OUTPATIENT
Start: 2020-10-07 | End: 2020-10-07

## 2020-10-07 RX ORDER — ALBUTEROL SULFATE 0.83 MG/ML
2.5 SOLUTION RESPIRATORY (INHALATION) EVERY 4 HOURS PRN
DISCHARGE
Start: 2020-10-07 | End: 2020-11-18

## 2020-10-07 RX ORDER — NALOXONE HYDROCHLORIDE 0.4 MG/ML
.1-.4 INJECTION, SOLUTION INTRAMUSCULAR; INTRAVENOUS; SUBCUTANEOUS
Status: DISCONTINUED | OUTPATIENT
Start: 2020-10-07 | End: 2020-10-07

## 2020-10-07 RX ORDER — DEXTROSE, SODIUM CHLORIDE, SODIUM LACTATE, POTASSIUM CHLORIDE, AND CALCIUM CHLORIDE 5; .6; .31; .03; .02 G/100ML; G/100ML; G/100ML; G/100ML; G/100ML
INJECTION, SOLUTION INTRAVENOUS CONTINUOUS
Status: DISCONTINUED | OUTPATIENT
Start: 2020-10-07 | End: 2020-10-09 | Stop reason: HOSPADM

## 2020-10-07 RX ORDER — ONDANSETRON 2 MG/ML
4 INJECTION INTRAMUSCULAR; INTRAVENOUS EVERY 6 HOURS PRN
Status: DISCONTINUED | OUTPATIENT
Start: 2020-10-07 | End: 2020-10-09 | Stop reason: HOSPADM

## 2020-10-07 RX ORDER — IBUPROFEN 800 MG/1
800 TABLET, FILM COATED ORAL EVERY 6 HOURS
Status: DISCONTINUED | OUTPATIENT
Start: 2020-10-08 | End: 2020-10-09 | Stop reason: HOSPADM

## 2020-10-07 RX ORDER — NALOXONE HYDROCHLORIDE 0.4 MG/ML
.1-.4 INJECTION, SOLUTION INTRAMUSCULAR; INTRAVENOUS; SUBCUTANEOUS ONCE
Status: ON HOLD | DISCHARGE
Start: 2020-10-07 | End: 2020-10-09

## 2020-10-07 RX ORDER — BISACODYL 10 MG
10 SUPPOSITORY, RECTAL RECTAL DAILY PRN
Status: ON HOLD | DISCHARGE
Start: 2020-10-09 | End: 2020-10-09

## 2020-10-07 RX ORDER — OXYTOCIN/0.9 % SODIUM CHLORIDE 30/500 ML
100 PLASTIC BAG, INJECTION (ML) INTRAVENOUS CONTINUOUS
Status: DISCONTINUED | OUTPATIENT
Start: 2020-10-07 | End: 2020-10-07 | Stop reason: HOSPADM

## 2020-10-07 RX ORDER — OXYTOCIN/0.9 % SODIUM CHLORIDE 30/500 ML
340 PLASTIC BAG, INJECTION (ML) INTRAVENOUS CONTINUOUS PRN
Status: DISCONTINUED | OUTPATIENT
Start: 2020-10-07 | End: 2020-10-07 | Stop reason: HOSPADM

## 2020-10-07 RX ORDER — OXYTOCIN/0.9 % SODIUM CHLORIDE 30/500 ML
340 PLASTIC BAG, INJECTION (ML) INTRAVENOUS CONTINUOUS PRN
Status: ON HOLD | DISCHARGE
Start: 2020-10-07 | End: 2020-10-09

## 2020-10-07 RX ORDER — MORPHINE SULFATE 1 MG/ML
INJECTION, SOLUTION EPIDURAL; INTRATHECAL; INTRAVENOUS PRN
Status: DISCONTINUED | OUTPATIENT
Start: 2020-10-07 | End: 2020-10-07

## 2020-10-07 RX ADMIN — LIDOCAINE HYDROCHLORIDE 20 MG: 10 INJECTION, SOLUTION EPIDURAL; INFILTRATION; INTRACAUDAL; PERINEURAL at 06:54

## 2020-10-07 RX ADMIN — SODIUM CHLORIDE, POTASSIUM CHLORIDE, SODIUM LACTATE AND CALCIUM CHLORIDE: 600; 310; 30; 20 INJECTION, SOLUTION INTRAVENOUS at 08:48

## 2020-10-07 RX ADMIN — FENTANYL CITRATE 15 MCG: 50 INJECTION, SOLUTION INTRAMUSCULAR; INTRAVENOUS at 07:42

## 2020-10-07 RX ADMIN — ONDANSETRON 4 MG: 2 INJECTION INTRAMUSCULAR; INTRAVENOUS at 08:09

## 2020-10-07 RX ADMIN — SODIUM CHLORIDE, POTASSIUM CHLORIDE, SODIUM LACTATE AND CALCIUM CHLORIDE: 600; 310; 30; 20 INJECTION, SOLUTION INTRAVENOUS at 07:05

## 2020-10-07 RX ADMIN — BUPIVACAINE HYDROCHLORIDE 10 ML: 2.5 INJECTION, SOLUTION EPIDURAL; INFILTRATION; INTRACAUDAL; PERINEURAL at 08:58

## 2020-10-07 RX ADMIN — ACETAMINOPHEN 975 MG: 325 TABLET, FILM COATED ORAL at 07:13

## 2020-10-07 RX ADMIN — ACETAMINOPHEN 975 MG: 325 TABLET, FILM COATED ORAL at 18:37

## 2020-10-07 RX ADMIN — BUPIVACAINE 5 ML: 13.3 INJECTION, SUSPENSION, LIPOSOMAL INFILTRATION at 08:57

## 2020-10-07 RX ADMIN — BUPIVACAINE HYDROCHLORIDE IN DEXTROSE 1.6 ML: 7.5 INJECTION, SOLUTION SUBARACHNOID at 07:42

## 2020-10-07 RX ADMIN — BUPIVACAINE HYDROCHLORIDE 10 ML: 2.5 INJECTION, SOLUTION EPIDURAL; INFILTRATION; INTRACAUDAL; PERINEURAL at 08:57

## 2020-10-07 RX ADMIN — SODIUM CITRATE AND CITRIC ACID MONOHYDRATE 30 ML: 500; 334 SOLUTION ORAL at 07:14

## 2020-10-07 RX ADMIN — Medication 50 MCG/MIN: at 07:43

## 2020-10-07 RX ADMIN — CEFAZOLIN SODIUM 2 G: 2 INJECTION, SOLUTION INTRAVENOUS at 07:47

## 2020-10-07 RX ADMIN — AZITHROMYCIN MONOHYDRATE 500 MG: 500 INJECTION, POWDER, LYOPHILIZED, FOR SOLUTION INTRAVENOUS at 07:05

## 2020-10-07 RX ADMIN — SENNOSIDES AND DOCUSATE SODIUM 1 TABLET: 8.6; 5 TABLET ORAL at 21:05

## 2020-10-07 RX ADMIN — BUPIVACAINE HYDROCHLORIDE 10 ML: 2.5 INJECTION, SOLUTION EPIDURAL; INFILTRATION; INTRACAUDAL; PERINEURAL at 09:01

## 2020-10-07 RX ADMIN — Medication 0.15 MG: at 07:42

## 2020-10-07 RX ADMIN — BUPIVACAINE 5 ML: 13.3 INJECTION, SUSPENSION, LIPOSOMAL INFILTRATION at 09:01

## 2020-10-07 RX ADMIN — BUPIVACAINE 5 ML: 13.3 INJECTION, SUSPENSION, LIPOSOMAL INFILTRATION at 09:02

## 2020-10-07 RX ADMIN — BUPIVACAINE HYDROCHLORIDE 10 ML: 2.5 INJECTION, SOLUTION EPIDURAL; INFILTRATION; INTRACAUDAL; PERINEURAL at 09:02

## 2020-10-07 RX ADMIN — BUPIVACAINE 5 ML: 13.3 INJECTION, SUSPENSION, LIPOSOMAL INFILTRATION at 08:58

## 2020-10-07 RX ADMIN — Medication 340 ML/HR: at 08:08

## 2020-10-07 RX ADMIN — Medication 340 ML/HR: at 09:51

## 2020-10-07 SDOH — HEALTH STABILITY: MENTAL HEALTH: CURRENT SMOKER: 0

## 2020-10-07 ASSESSMENT — MIFFLIN-ST. JEOR: SCORE: 1378.65

## 2020-10-07 ASSESSMENT — ENCOUNTER SYMPTOMS: SEIZURES: 0

## 2020-10-07 ASSESSMENT — ACTIVITIES OF DAILY LIVING (ADL)
FALL_HISTORY_WITHIN_LAST_SIX_MONTHS: NO
TOILETING_ISSUES: NO
FALL_HISTORY_WITHIN_LAST_SIX_MONTHS: NO
TOILETING_ISSUES: NO

## 2020-10-07 ASSESSMENT — LIFESTYLE VARIABLES: TOBACCO_USE: 0

## 2020-10-07 NOTE — PROGRESS NOTES
S: Triage Arrival  B: Deanna is a 25 y.o.  @ 35w 6d who presents with complaint of gush of fluid following voiding around 0500  A: EFM applied. FHT's 145 with moderate variability, accels present, no decels noted on strip. Contractions has had 1 in past 20 minutes. ROM+ and urine specimens sent to lab.  R: Will notify MD to obtain further orders.

## 2020-10-07 NOTE — OR NURSING
Transfer from  OB PACU to Room 361  Transferred via hospital bed    S: 26 y/o female  S/P        Anesthesia Type:  Spinal with astromorph       Surgeon:  Dr. Carrillo       Allergies:  See Medication Reconciliation Record       DNR:   (Yes,No)    B:  Pertinent Medical History:   Past Medical History:   Diagnosis Date     Supervision of high-risk pregnancy 2018      (CHF; Heart Disease; Lung Disease; Chronic Pain; Diabetes; Other (Comment)          Surgical History:    Past Surgical History:   Procedure Laterality Date      SECTION N/A 2018    Procedure:  SECTION;  multi select  Delivery;  Surgeon: Douglas Cardenas MD;  Location: [facility] L+D      TOOTH EXTRACTION W/FORCEP           A:  EBL: 1250        IVF:  1000 ml lactated ringer, bag 1 of Pitocin infused, second bag will be hung prior to transport to room 361        UOP:  200        NPO:  ___Yes ___No         Vomiting:  ___Yes ___No         Drainage: vaginal bleeding        Skin Integrity: new surgical incision to abdomen (Normal; Pressure Ulcer (Location)        RFO: _x__Yes___No (identify item if present) hampton catheter        SSI Patient?  _x__Yes___No (if yes, see checklist for actions)        Brace/sling/equipment:  ___Yes___No (identify item if present)none         See PACU record for ongoing assessment, vital signs and pain assessment.    R: Post-Op vitals and assessments as ordered/indicated per patient's condition.       Follow Post-Op orders and notify Physician prn.       Continue to involve patient/family in plan of care and discharge planning.       Reinforce Pre-Operative education.       Implement skin safety interventions as appropriate.    Name: Joyce Winter RN

## 2020-10-07 NOTE — PROGRESS NOTES
ROM+ positive, Dr. Carrillo and Dr. Basilio aware, patient preped for C/S. Report given to day shift nurse.

## 2020-10-07 NOTE — ANESTHESIA PREPROCEDURE EVALUATION
Anesthesia Pre-Procedure Evaluation    Patient: Deanna Leahy   MRN: [id number] : 1995          Preoperative Diagnosis: Encounter for supervision of other normal pregnancy, third trimester [Z34.83]    Procedure(s):   section    Past Medical History:   Diagnosis Date     Supervision of high-risk pregnancy 2018     Past Surgical History:   Procedure Laterality Date      SECTION N/A 2018    Procedure:  SECTION;  multi select  Delivery;  Surgeon: Douglas Cardenas MD;  Location: [facility] L+D     HC TOOTH EXTRACTION W/FORCEP         Anesthesia Evaluation     . Pt has had prior anesthetic. Type: MAC    No history of anesthetic complications          ROS/MED HX    ENT/Pulmonary:  - neg pulmonary ROS    (-) tobacco use and asthma   Neurologic:  - neg neurologic ROS    (-) seizures and migraines   Cardiovascular:  - neg cardiovascular ROS   (+) ----. : . . . :. . No previous cardiac testing       METS/Exercise Tolerance:     Hematologic:  - neg hematologic  ROS       Musculoskeletal:  - neg musculoskeletal ROS       GI/Hepatic:     (+) GERD Asymptomatic on medication,       Renal/Genitourinary:  - ROS Renal section negative       Endo:  - neg endo ROS       Psychiatric:  - neg psychiatric ROS       Infectious Disease:  - neg infectious disease ROS       Malignancy:      - no malignancy   Other:    - neg other ROS                      Physical Exam  Normal systems: cardiovascular, pulmonary and dental    Airway   Mallampati: I  TM distance: <3 FB  Neck ROM: full    Dental     Cardiovascular   Rhythm and rate: regular and normal      Pulmonary    breath sounds clear to auscultation            Lab Results   Component Value Date    WBC 7.9 10/07/2020    HGB 12.0 10/07/2020    HCT 36.3 10/07/2020     10/07/2020     2019    POTASSIUM 4.0 2019    CHLORIDE 108 2019    CO2 25 2019    BUN 15 2019    CR 0.57 2019     (H)  "04/26/2019    ATBBY 8.7 04/26/2019    ALBUMIN 4.1 01/16/2018    PROTTOTAL 8.5 01/16/2018    ALT 20 01/16/2018    AST 19 01/16/2018    ALKPHOS 76 01/16/2018    BILITOTAL 0.3 01/16/2018    TSH 2.48 08/30/2017    HCG Positive (A) 03/03/2020       Preop Vitals  BP Readings from Last 3 Encounters:   10/07/20 112/77   09/30/20 98/66   09/21/20 112/62    Pulse Readings from Last 3 Encounters:   10/07/20 112   09/30/20 130   09/21/20 100      Resp Readings from Last 3 Encounters:   10/07/20 14   09/30/20 14   09/21/20 18    SpO2 Readings from Last 3 Encounters:   09/30/20 100%   09/21/20 99%   08/28/20 100%      Temp Readings from Last 1 Encounters:   10/07/20 98  F (36.7  C) (Oral)    Ht Readings from Last 1 Encounters:   10/07/20 1.575 m (5' 2\")      Wt Readings from Last 1 Encounters:   10/07/20 68 kg (150 lb)    Estimated body mass index is 27.44 kg/m  as calculated from the following:    Height as of this encounter: 1.575 m (5' 2\").    Weight as of this encounter: 68 kg (150 lb).       Anesthesia Plan      History & Physical Review  History and physical reviewed and following examination; no interval change.    ASA Status:  2 .    NPO Status:  > 6 hours    Plan for Spinal and Peripheral Nerve Block   PONV prophylaxis:  Ondansetron (or other 5HT-3) and Dexamethasone or Solumedrol    The patient is not a current smoker  and patient did not smoke on day of surgery     Postoperative Care  Postoperative pain management:  IV analgesics, Neuraxial analgesia, Multi-modal analgesia and Peripheral nerve block (Single Shot).      Consents  Anesthetic plan, risks, benefits and alternatives discussed with:  Patient and Spouse.  Use of blood products discussed: No .   .                 JERICHO Arzate CRNA  "

## 2020-10-07 NOTE — ANESTHESIA PROCEDURE NOTES
Procedure note : TAP      Staff -   CRNA: Chun Hawley APRN CRNA  Other Anesthesia Staff: Tena Chaudhari  Performed By: CRNA and TONO  Pre-Procedure    Location: OR      Pre-Anesthestic Checklist: patient identified, IV checked, site marked, risks and benefits discussed, informed consent, monitors and equipment checked, pre-op evaluation, at physician/surgeon's request and post-op pain management    Timeout  Correct Patient: Yes   Correct Procedure: Yes   Correct Site: Yes   Correct Laterality: Yes   Correct Position: Yes   Site Marked: Yes   .   Procedure Documentation    Diagnosis:BREECH PRESENTATION.    Procedure: TAP, bilateral.   Patient Position:supine   Ultrasound used to identify targeted nerve, plexus, or vascular marker and placed a needle adjacent to it., Ultrasound was used to visualize the spread of the anesthetic in close proximity to the above stated nerve. A permanent image is entered into the patient's record.  Patient Prep/Sterile Barriers; mask, sterile gloves, chlorhexidine gluconate and isopropyl alcohol.  .  Needle: insulated   Needle Gauge: 22.  Needle Length (millimeters) 100  Insertion Method: Single Shot.        Assessment/Narrative  Paresthesias: No.  Injection made incrementally with aspirations every 5 mL..  The placement was negative for: blood aspirated, painful injection and site bleeding.  Bolus given via needle..   Secured via.   Complications: none. Test dose of mL at. Test dose negative for signs of intravascular, subdural or intrathecal injection. Comments:  Bilateral US guided TAP block performed per TONO HAQ under direct supervision of TERESA Hawley CRNA.  No problems / complications were noted.    Pt tolerated the procedure well as under spinal Anesthesia.  There was good visualization of the space between the internal oblique and the transverses abdominis.  There was also good visualization of fluid dissection in this layer.  No complications were noted.  I will follow up  with this pt if needed.

## 2020-10-07 NOTE — OP NOTE
[place]  Operative Note    Name: Deanna Leahy  MRN: [id number]  : 1995  Date of Surgery: 10/07/2020    Pre-operative Diagnosis:   Intrauterine pregnancy at 35 weeks 6 days   Premature Rupture of Membranes  James Breech presentation  Post-operative Diagnosis: Same  Procedure(s):   Repeat lower transverse  via Pfannenstiel with two layer uterine closure    Surgeon: Alannah Carrillo DO  Assist: Romulo Basilio MD    Anesthesia: Spinal, TAP block  EBL: 1265 mL   Urine Output: 200 mL clear urine   Fluids: 1000 mL LR    Specimens: Cord blood   Complications: None apparent.  Findings: Viable infant delivered at 0807 on 2020 with APGARs 3, 1, 6 and 8 and weight 7lbs 4oz in the james breech presentation. Normal uterus, tubes, and ovaries. No adhesions.     Indications: Deanna Leahy was admitted as a 25 year old  at 35w6d who presented with PPROM at 0500 on 10/7 with confirmation by ROM + and clinical exam. Given gestational age and PPROM, recommendation as made to proceed with delivery. Fetus was in the breech presentation and the patient desired repeat  section. Details of the procedure were discussed with the patient.  Risks include, but are not limited to, bleeding, infection, and injury to surrounding organs such as the bowel, urinary system, nerves, and blood vessels.  Injury may result in repair at the time of the surgery or in a separate procedure.  All questions answered, and accepting these risks, the patient elects to proceed with the procedure.       Procedure Details: The patient was taken back to the operating room where she underwent spinal anesthesia. She was prepped and draped in the usual sterile fashion in the dorsal supine position with a leftward tilt. A safety patient time out was performed. 2 gm Ancef and 500 mg Azithromycin was administered. A Pfannenstiel skin incision was made with the scalpel and carried through the underlying layer to  the fascia. The fascia was incised in the midline and extended laterally with blunt dissection. The superior aspect of the fascial incision was grasped with kocher clamps, elevated and the underlying rectus muscles dissected off with a combination of blunt and sharp dissection. Attention was then turned to the inferior aspect of the incision, which in a similar fashion was grasped, tented up and the rectus muscle dissected off the fascia. The rectus muscles were  in the midline. The peritoneum was identified and entered bluntly. This was extended with blunt dissection and cautery. The Roberto O was inserted. The vesicouterine peritoneum was scored sharply, then extended inferiorly with gentle traction. The lower uterine segment was incised in a transverse fashion with the scalpel. The incision was extended digitally. The infant was in the james breech position and was delivered atraumatically with standard breech maneuvers. The cord was clamped and cut and the infant was handed off to the waiting staff.  Cord blood was collected. The placenta was removed with gentle traction on the cord and fundal message. IV pitocin was started to initiate uterine contractions.    The uterus was cleared of all clots and debris. The uterine incision was repaired with 0-Vicryl in a running locked fashion. A second layer of 0-Viryl was used to imbricate the incision. The uterus was hemostatic and the Roberto-O was removed.    The uterus was reexamined and noted to be hemostatic. The gutters were cleared of clots. The rectus muscle was carefully inspected and good hemostasis was noted. The fascia was closed with 0-vicryl in a running fashion. The subcutaneous tissue was closed with 2-0 plain. The skin was closed with 4-0 Vicryl and surgical glue.    A TAP block was then performed per anesthesia.    The patient tolerated the procedure well and was taken to the recovery room in stable condition. All lap, instrument, and sharps  counts were correct times two.      Alannah Carrillo DO MD  10/7/2020 9:02 AM

## 2020-10-07 NOTE — PLAN OF CARE
Patient arrived to unit at 1700 accompanied by paramedics. She is stable and denies pain. Plan of care reviewed with patient. She pumps with result and ambulated to the bathroom with stand by assist. Plans to see baby in NICU once seen by resident. Will continue with plan of care.

## 2020-10-07 NOTE — PROGRESS NOTES
1450 Report given to Radha LARA  from [facility], light vag flow, pad changed. Pt denies pain. Pt did complain of dizziness when HOB elevated, deferred on getting pt up due to dizziness and leg weakness. Saavedra discontinued at 1100, Bladder scanned for 996mls. Straight cathed for 975 mls.. Expecting transport within 30 minutes.

## 2020-10-07 NOTE — PROGRESS NOTES
S: To OR via  @ 5827  B: Patient is a  who presented for SROM   @0500, Breech presentation decision was made to proceed with a C/S   A: Transfer of care toDevi LARA. FHT's 130 prior to spinal placement. Spinal placed @ 0742 FHT's after spinal 140 NKA Consents signed.  R: Ready for CS.

## 2020-10-07 NOTE — PROGRESS NOTES
1540 Pt Transferred to [facility] by BLS ambulance Report given to paramedic, Leon. Belongings sent with , except pumped breast milk sent with pt.

## 2020-10-07 NOTE — ANESTHESIA PROCEDURE NOTES
Procedure note : intrathecal      Staff -   CRNA: Chun Hawley APRN CRNA  Other Anesthesia Staff: Tena Chaudhari  Performed By: CRNA and TONO  Pre-Procedure    Location: OR      Pre-Anesthestic Checklist: patient identified, IV checked, risks and benefits discussed, informed consent, monitors and equipment checked and pre-op evaluation    Timeout  Correct Patient: Yes   Correct Procedure: Yes   Correct Site: Yes   Correct Laterality: N/A   Correct Position: Yes   Site Marked: N/A   .   Procedure Documentation  ASA 2  Diagnosis:breech presentation - previous .    Procedure: intrathecal, .   Patient Position:sitting Insertion Site:L3-4  (midline approach)     Patient Prep/Sterile Barriers; mask, sterile gloves, povidone-iodine 7.5% surgical scrub, patient draped.  .  Needle:  Spinal Needle (gauge): 25  Spinal/LP Needle Length (inches): 3.5 # of attempts: 1 and  # of redirects:  1 Introducer used Introducer: 20 G .        Assessment/Narrative  Paresthesias: No.  .  .  clear CSF fluid removed . Time Injected: 07:42while sitting   Sensory Level: T5 Comments:  Easily placed x 1 attempt per TONO HAQ under the direct supervision of TERESA Hawley CRNA

## 2020-10-07 NOTE — ANESTHESIA CARE TRANSFER NOTE
Patient: Deanna Leahy    Procedure(s):   section    Diagnosis: Encounter for supervision of other normal pregnancy, third trimester [Z34.83]  Diagnosis Additional Information: No value filed.    Anesthesia Type:   Spinal, Peripheral Nerve Block, MAC     Note:  Airway :Room Air  Patient transferred to:PACU  Handoff Report: Identifed the Patient, Identified the Reponsible Provider, Reviewed the pertinent medical history, Discussed the surgical course, Reviewed Intra-OP anesthesia mangement and issues during anesthesia, Set expectations for post-procedure period and Allowed opportunity for questions and acknowledgement of understanding      Vitals: (Last set prior to Anesthesia Care Transfer)    CRNA VITALS  10/7/2020 0838 - 10/7/2020 0914      10/7/2020             Temp:  (!) 67.1  F (19.5  C)    Temp 2:  (!) 32  F (0  C)                Electronically Signed By: JERICHO Arzate CRNA  2020  9:14 AM

## 2020-10-07 NOTE — DISCHARGE SUMMARY
"[facility] Discharge/Transfer Summary    Deanna Leahy MRN# [id number]   Age: 25 year old YOB: 1995     Date of Admission:  10/7/2020  Date of Discharge::  10/7/2020  Admitting Physician:  Alannah Carrillo DO  Discharge Physician:  Alannah Carrillo DO     Home clinic: [facility]          Admission Diagnoses:   Intrauterine pregnancy at 35 weeks and 6 days  Geoffrey Breech Presentation  History  section x1, declined TOLAC/ECV  PPROM          Discharge Diagnosis:     Same s/p repeat  section          Procedures:     Procedure(s): Repeat Low Transverse  Section       -           Medications Prior to Admission:     Facility-Administered Medications Prior to Admission   Medication Dose Route Frequency Provider Last Rate Last Dose     [DISCONTINUED] rho(D) immune globulin (RHOGAM) injection 300 mcg  300 mcg Intramuscular Once Romulo Basilio MD         Medications Prior to Admission   Medication Sig Dispense Refill Last Dose     Prenatal Vit-Fe Fumarate-FA (PRENATAL MULTIVITAMIN W/IRON) 27-0.8 MG tablet Take 1 tablet by mouth daily   10/6/2020 at 2100     order for DME Equipment being ordered:Breast pump (Patient not taking: Reported on 2020) 1 Device 0              Discharge Medications:     Current Facility-Administered Medications   Medication     acetaminophen (TYLENOL) tablet 975 mg     albuterol (PROVENTIL) neb solution 2.5 mg     [START ON 10/9/2020] bisacodyl (DULCOLAX) Suppository 10 mg     Blood Bank will determine if patient is eligible for and the proper dosage of rho (D) immune globulin     bupivacaine liposome (EXPAREL) LONG ACTING injection was administered into the infiltration site to produce postsurgical analgesia. Duration of action is up to 72 hours, and other \"natalia\" medications should not be given for 96 hours with the exception of the lidocaine 5% patch (LIDODERM) and the lidocaine 10mg in potassium infusions. " This entry is for INFORMATION ONLY.     ceFAZolin (ANCEF) 1 g vial to attach to  ml bag for ADULT or 50 ml bag for PEDS     dextrose 5% in lactated ringers infusion     fentaNYL (PF) (SUBLIMAZE) injection 25-50 mcg     hydrocortisone 2.5 % cream     [START ON 10/8/2020] ibuprofen (ADVIL/MOTRIN) tablet 800 mg     ketorolac (TORADOL) injection 30 mg     lactated ringers BOLUS 1,000 mL     lactated ringers infusion     lactated ringers infusion     lanolin cream     lidocaine (LMX4) kit     lidocaine 1 % 0.1-1 mL     naloxone (NARCAN) injection 0.1-0.4 mg     No MMR Needed -  Assessment: Patient does not need MMR vaccine     No Tdap Needed - Assessment: Patient does not need Tdap vaccine     ondansetron (ZOFRAN-ODT) ODT tab 4 mg    Or     ondansetron (ZOFRAN) injection 4 mg     ondansetron (ZOFRAN) injection 4 mg     oxyCODONE (ROXICODONE) tablet 5 mg     oxyCODONE (ROXICODONE) tablet 5 mg     oxytocin (PITOCIN) 30 units in 500 mL 0.9% NaCl infusion     oxytocin (PITOCIN) 30 units in 500 mL 0.9% NaCl infusion     oxytocin (PITOCIN) injection 10 Units     prenatal multivitamin w/iron per tablet 1 tablet     senna-docusate (SENOKOT-S/PERICOLACE) 8.6-50 MG per tablet 1 tablet    Or     senna-docusate (SENOKOT-S/PERICOLACE) 8.6-50 MG per tablet 2 tablet     simethicone (MYLICON) chewable tablet 80 mg     sodium chloride (PF) 0.9% PF flush 3 mL     sodium chloride (PF) 0.9% PF flush 3 mL     [START ON 10/9/2020] sodium phosphate (FLEET ENEMA) 1 enema     tranexamic acid 1 g in 100 mL 0.7% NaCl IV bag (premix)             Consultations:   No consultations were requested during this admission          Brief History of Labor:   Deanna Leahy was admitted as a 25 year old  at 35w6d who presented with PPROM at 0500 on 10/7 with confirmation by ROM + and clinical exam. Given gestational age and PPROM, recommendation as made to proceed with delivery. Fetus was in the breech presentation and the patient desired  repeat  section. We did discuss risks associated with late  delivery and betamethasone was offered. However, given plan for immediate delivery in the late  period, this was declined. Details of the procedure were discussed with the patient.  Risks include, but are not limited to, bleeding, infection, and injury to surrounding organs such as the bowel, urinary system, nerves, and blood vessels.  Injury may result in repair at the time of the surgery or in a separate procedure.  All questions answered, and accepting these risks, the patient elects to proceed with the procedure.        Hospital Course:      Patient underwent an uncomplicated repeat  section with delivery of james breech fetus. QBL was 1265mL; however, no clear etiology as no significant bleeding encountered during procedure.  Apgars were 3, 1, 6 and 8 and weight 7lbs 4oz. Baby was resuscitated by  care provider and transfer to a high level of care was recommended. Baby was accepted at the [facility] in the NICU, and mom desired transfer to be near baby. By transfer, patient was doing well. Pain was well controlled after TAP block,  as spinal anesthesia was wearing off. She is on post-op ERAS. Saaevdra has been removed but she has not yet voided.     Pre-op Hgb 12       Discharge Instructions and Follow-Up:     Discharge diet: Advance to a regular diet as tolerated   Discharge activity: No heavy lifting, pushing, pulling for 6 week(s)  No driving or operating machinery while on narcotic analgesics  Pelvic rest: abstain from intercourse and do not use tampons for 6 week(s)   Discharge follow-up: Follow up with primary care provider in 1 weeks           Discharge Disposition:     Discharged to [facility]       Alannah Carrillo DO  Ob/Gyn  2020 1:05 PM

## 2020-10-07 NOTE — H&P
L&D History and Physical   2020  Deanna Leahy  [id number]      HPI: Deanna Leahy is a 25 year old  at 35w6d by LMP c/w 12w6d US who presents today for PPROM at 0500. Patient reports gush of fluid followed by continuous leakage; fluid is clear. She states that she is otherwise feeling well today.   +FM.  No LOF, vaginal bleeding, or contractions.  She denies fever, chills, SOB, chest pain, palpitations, N/V.  No concerns for headache, vision changes, RUQ or epigastric pain.  No dysuria, constipation or diarrhea.    Pregnancy complicated by:  -Breech presentation, with history of  section for breech. Was initially considering TOLAC, however, has changed her mind. She also declines ECV to attempt to flip baby and attempt TOLAC. She wishes to proceed with a repeat  section.   -Rh negative, s/p rhogam           OBHX:   OB History    Para Term  AB Living   3 1 1 0 1 0   SAB TAB Ectopic Multiple Live Births   0 0 0 0 0      # Outcome Date GA Lbr Juancho/2nd Weight Sex Delivery Anes PTL Lv   3 Current            2 Term 18 39w0d  3.63 kg (8 lb) F CS-LTranv Spinal N       Name: Sona      Apgar1: 9  Apgar5: 10   1 AB 2017 4w0d             Obstetric Comments   EDC 2020 based on LMP.  Happy to be pregnant.   to Claudio.       MedicalHX:   Past Medical History:   Diagnosis Date     Supervision of high-risk pregnancy 2018       SurgicalHX:   Past Surgical History:   Procedure Laterality Date      SECTION N/A 2018    Procedure:  SECTION;  multi select  Delivery;  Surgeon: Douglas Cardenas MD;  Location:  L+D      TOOTH EXTRACTION W/FORCEP         Medications:   No current facility-administered medications on file prior to encounter.        Prenatal Vit-Fe Fumarate-FA (PRENATAL MULTIVITAMIN W/IRON) 27-0.8 MG tablet, Take 1 tablet by mouth daily       order for DME, Equipment being ordered:Breast pump (Patient not  taking: Reported on 9/30/2020)        Allergies:  Allergies   Allergen Reactions     No Known Drug Allergies        FamilyHX:  Family History   Problem Relation Age of Onset     C.A.D. Maternal Grandfather         50's?     Hyperlipidemia Mother      Anemia Mother      Crohn's Disease Father      Cancer Paternal Grandmother         lung     Prostate Cancer Paternal Grandfather      No Known Problems Daughter        SocialHX:   Social History     Socioeconomic History     Marital status:      Spouse name: Not on file     Number of children: Not on file     Years of education: Not on file     Highest education level: Not on file   Occupational History     Not on file   Social Needs     Financial resource strain: Not on file     Food insecurity     Worry: Not on file     Inability: Not on file     Transportation needs     Medical: Not on file     Non-medical: Not on file   Tobacco Use     Smoking status: Never Smoker     Smokeless tobacco: Never Used   Substance and Sexual Activity     Alcohol use: No     Drug use: No     Sexual activity: Yes     Partners: Male   Lifestyle     Physical activity     Days per week: Not on file     Minutes per session: Not on file     Stress: Not on file   Relationships     Social connections     Talks on phone: Not on file     Gets together: Not on file     Attends Synagogue service: Not on file     Active member of club or organization: Not on file     Attends meetings of clubs or organizations: Not on file     Relationship status: Not on file     Intimate partner violence     Fear of current or ex partner: Not on file     Emotionally abused: Not on file     Physically abused: Not on file     Forced sexual activity: Not on file   Other Topics Concern     Parent/sibling w/ CABG, MI or angioplasty before 65F 55M? No   Social History Narrative    3/2020  Lives in [place] with , Claudio and daughter, Sona.   No smokers in the household.  No concerns about domestic violence.  " Has two indoor cats.  Aware of toxoplasmosis precautions.  Deanna is a stay-at-home mom  Claudio is a .       ROS: 10 point ROS negative other than above    Physical Exam:  Patient Vitals for the past 24 hrs:   BP Temp Temp src Pulse Resp Height Weight   10/07/20 0606 112/77 98  F (36.7  C) Oral 112 14 1.575 m (5' 2\") 68 kg (150 lb)     General: AAOx3, appropriately interactive, NAD, appears generally well  CV: RRR, normal S1/S2, no m/r/g  Lungs: CTAB, non-labored breathing, no wheezes, rales, or rhonchi  Abdomen: soft, gravid, non-tender, EFW 5-6lbs; breech by leopold's, head maternal LUQ  SVE:  deferred  Extremities: Non-tender without edema bilaterally in LE    FHT: 140 moderate variability, occasional accels present, no decels   Wildrose: absent    Labs:    Lab Results   Component Value Date    ABO AB 2020    RH Neg 2020    AS Neg 2020    HEPBANG Nonreactive 03/10/2020    CHPCRT Negative 2018    GCPCRT Negative 2018    TREPAB Negative 2018    HGB 12.5 2020       GBS Status:   Lab Results   Component Value Date    GBS Negative 2018       Lab Results   Component Value Date    PAP NIL 2018     ROM Plus positive    CBC and Type&Screen pending    Assessment: 25 year old  at 35w6d by LMP c/w 12w6d US, here for PPROM, breech presentation     Pregnancy c/b:  -Breech presentation, with history of  section for breech. Was initially considering TOLAC, however, has changed her mind. She also declines ECV to attempt to flip baby and attempt TOLAC. She wishes to proceed with a repeat  section. Details of the procedure were discussed with the patient.  Risks include, but are not limited to, bleeding, infection, and injury to surrounding organs such as the bowel, urinary system, nerves, and blood vessels.  Injury may result in repair at the time of the surgery or in a separate procedure.  All questions answered, and accepting these risks, the " patient elects to proceed with the procedure.   -Rh negative, s/p rhogam     Plan:  -Admit to labor and delivery  -FWB:  Cat 1 tracing, CFM  -GBS unk: Pre-op antibiotics ordered, start azithromycin now  -Pain control: Spinal, TAP block  -Admission labs and COVID testing ordered  - >34 weeks. Recommendation to proceed with  section if declining ECV/TOLAC. BMZ limited utility so close to delivery, but certainly an option if desired. RBA of administration now versus delaying surgery for efficacy versus deferring reviewed in detail. Patient and  acknowledged this and agreed to proceed with  section now without betamethasone.  -NPO  -Peds: Dr. Praful Carrillo, DO  OB/GYN  2020

## 2020-10-07 NOTE — PROGRESS NOTES
Arrangements being made to transfer patient to [place] to be with baby. Dr. Carrillo has given physician to physician report. Awaiting confirmation from receiving charge nurse.

## 2020-10-08 LAB
BLOOD BANK CMNT PATIENT-IMP: NORMAL
HGB BLD-MCNC: 10 G/DL (ref 11.7–15.7)

## 2020-10-08 PROCEDURE — 86900 BLOOD TYPING SEROLOGIC ABO: CPT | Performed by: OBSTETRICS & GYNECOLOGY

## 2020-10-08 PROCEDURE — 250N000013 HC RX MED GY IP 250 OP 250 PS 637: Performed by: STUDENT IN AN ORGANIZED HEALTH CARE EDUCATION/TRAINING PROGRAM

## 2020-10-08 PROCEDURE — 85461 HEMOGLOBIN FETAL: CPT | Performed by: OBSTETRICS & GYNECOLOGY

## 2020-10-08 PROCEDURE — 120N000002 HC R&B MED SURG/OB UMMC

## 2020-10-08 PROCEDURE — 85018 HEMOGLOBIN: CPT | Performed by: STUDENT IN AN ORGANIZED HEALTH CARE EDUCATION/TRAINING PROGRAM

## 2020-10-08 PROCEDURE — 99232 SBSQ HOSP IP/OBS MODERATE 35: CPT | Mod: GC | Performed by: OBSTETRICS & GYNECOLOGY

## 2020-10-08 PROCEDURE — 36415 COLL VENOUS BLD VENIPUNCTURE: CPT | Performed by: STUDENT IN AN ORGANIZED HEALTH CARE EDUCATION/TRAINING PROGRAM

## 2020-10-08 PROCEDURE — 250N000011 HC RX IP 250 OP 636: Performed by: STUDENT IN AN ORGANIZED HEALTH CARE EDUCATION/TRAINING PROGRAM

## 2020-10-08 PROCEDURE — 86901 BLOOD TYPING SEROLOGIC RH(D): CPT | Performed by: OBSTETRICS & GYNECOLOGY

## 2020-10-08 PROCEDURE — 3E0234Z INTRODUCTION OF SERUM, TOXOID AND VACCINE INTO MUSCLE, PERCUTANEOUS APPROACH: ICD-10-PCS | Performed by: OBSTETRICS & GYNECOLOGY

## 2020-10-08 RX ORDER — HYDROXYZINE HYDROCHLORIDE 25 MG/1
25 TABLET, FILM COATED ORAL EVERY 6 HOURS PRN
Status: DISCONTINUED | OUTPATIENT
Start: 2020-10-08 | End: 2020-10-09 | Stop reason: HOSPADM

## 2020-10-08 RX ORDER — HYDROXYZINE HYDROCHLORIDE 50 MG/1
50 TABLET, FILM COATED ORAL EVERY 6 HOURS PRN
Status: DISCONTINUED | OUTPATIENT
Start: 2020-10-08 | End: 2020-10-09 | Stop reason: HOSPADM

## 2020-10-08 RX ADMIN — ACETAMINOPHEN 975 MG: 325 TABLET, FILM COATED ORAL at 06:28

## 2020-10-08 RX ADMIN — ACETAMINOPHEN 975 MG: 325 TABLET, FILM COATED ORAL at 00:38

## 2020-10-08 RX ADMIN — ACETAMINOPHEN 975 MG: 325 TABLET, FILM COATED ORAL at 12:14

## 2020-10-08 RX ADMIN — HYDROXYZINE HYDROCHLORIDE 50 MG: 50 TABLET, FILM COATED ORAL at 01:57

## 2020-10-08 RX ADMIN — KETOROLAC TROMETHAMINE 30 MG: 30 INJECTION, SOLUTION INTRAMUSCULAR; INTRAVENOUS at 06:25

## 2020-10-08 RX ADMIN — SIMETHICONE 80 MG: 80 TABLET, CHEWABLE ORAL at 18:37

## 2020-10-08 RX ADMIN — ACETAMINOPHEN 975 MG: 325 TABLET, FILM COATED ORAL at 18:36

## 2020-10-08 RX ADMIN — OXYCODONE HYDROCHLORIDE 5 MG: 5 TABLET ORAL at 20:51

## 2020-10-08 RX ADMIN — DOCUSATE SODIUM 50 MG AND SENNOSIDES 8.6 MG 2 TABLET: 8.6; 5 TABLET, FILM COATED ORAL at 18:37

## 2020-10-08 RX ADMIN — IBUPROFEN 800 MG: 800 TABLET ORAL at 18:36

## 2020-10-08 RX ADMIN — DOCUSATE SODIUM 50 MG AND SENNOSIDES 8.6 MG 2 TABLET: 8.6; 5 TABLET, FILM COATED ORAL at 08:04

## 2020-10-08 RX ADMIN — HUMAN RHO(D) IMMUNE GLOBULIN 300 MCG: 300 INJECTION, SOLUTION INTRAMUSCULAR at 20:48

## 2020-10-08 RX ADMIN — IBUPROFEN 800 MG: 800 TABLET ORAL at 12:14

## 2020-10-08 RX ADMIN — KETOROLAC TROMETHAMINE 30 MG: 30 INJECTION, SOLUTION INTRAMUSCULAR; INTRAVENOUS at 00:38

## 2020-10-08 NOTE — PROVIDER NOTIFICATION
10/08/20 1541   Provider Notification   Provider Name/Title G2 Giuliano   Method of Notification Electronic Page   Request Evaluate-Remote   Notification Reason Other  (Rhogam)   clarification of rhogam administration, per blood bank patient needs rhogam.

## 2020-10-08 NOTE — PROVIDER NOTIFICATION
10/08/20 0110   Provider Notification   Provider Name/Title Dr. Duran   Method of Notification In Department   Request Evaluate-Remote   Notification Reason Medication Request   Provider notified due to patient asking for a medication to help her sleep. Provider placed order for vistaril. Also, provider was updated that patient needs to be redrawn for type and screen. Provider okay for it to be done in the AM and for patient to receive rhogam later in the AM.

## 2020-10-08 NOTE — PLAN OF CARE
Patient verbalized she's comfortable, pumping with result  and was able to void spontaneously with bloody colored urine. Encouraged to increase oral fluid intake. Down to NICU in wc to visit baby. Will continue with plan of care.

## 2020-10-08 NOTE — DISCHARGE SUMMARY
[facility] Discharge Summary    Deanna Leahy MRN# [id number]   Age: 25 year old YOB: 1995     Date of Admission:  10/7/2020  Date of Discharge:  10/09/20   Admitting Physician:  Tish Singh MD  Discharge Physician:  Deja Del Angel MD    Admit Dx:   -  POD#0 s/p RLTCS for breech presentation, PPROM  - Asthma    Discharge Dx:  - Same as above    Procedures:  - None    Admit HPI:  Deanna Leahy is a 25 year old  admitted as a postpartum transfer for NICU cares.      She states that she is feeling well today. She is urinating spontaneously. Per RN, there is no bloody discharge with fundal massage. Patient reports scant vaginal bleeding.  Was previously dizzy upon standing but this has resolved and is not dizzy or lightheaded when in bed. Reports good pain control and minimal abdominal pain or incisional pain. States it rates a 1/10 at present. Received a TAP block prior to discharge in [facility]. Patient denies Hx of asthma. Currently breastfeeding. Considering partner vasectomy as a contraception option as they are thinking about permanent sterilization but currently undecided.      She is passing gas and stool without problems. She is eating and drinking without difficulty. She denies headache, vision changes, chest pain, shortness of breath, fever, chills, nausea, vomiting or other systemic complaints.     Please see her admit H&P for full details of her PMH, PSH, Meds, Allergies and exam on admit.    Hospital Course:  Her postoperative course was uncomplicated, she was transferred from an outside hospital for NICU cares. On POD#2, she was meeting all of her postpartum goals and deemed stable for discharge. She was voiding without difficulty, tolerating a regular diet without nausea and vomiting, her pain was well controlled on oral pain medicines and her lochia was appropriate. Her hemoglobin prior to delivery was 12.0 and after  delivery was 10.0. Her Rh status was negative and Rhogam was indicated and received prior to discharge  Discharge Medications:     Review of your medicines      CONTINUE these medicines which may have CHANGED, or have new prescriptions. If we are uncertain of the size of tablets/capsules you have at home, strength may be listed as something that might have changed.      Dose / Directions   acetaminophen 325 MG tablet  Commonly known as: TYLENOL  This may have changed:     how much to take    when to take this    reasons to take this    additional instructions  Used for: S/P  section      Dose: 650 mg  Take 2 tablets (650 mg) by mouth every 6 hours as needed for mild pain Start after Delivery.  Quantity: 100 tablet  Refills: 0     ibuprofen 600 MG tablet  Commonly known as: ADVIL/MOTRIN  This may have changed:     medication strength    how much to take    when to take this    reasons to take this    additional instructions  Used for: S/P  section      Dose: 600 mg  Take 1 tablet (600 mg) by mouth every 6 hours as needed for moderate pain Start after delivery  Quantity: 60 tablet  Refills: 0     oxyCODONE 5 MG tablet  Commonly known as: ROXICODONE  This may have changed:     when to take this    reasons to take this    Another medication with the same name was removed. Continue taking this medication, and follow the directions you see here.  Used for: S/P  section      Dose: 5 mg  Take 1 tablet (5 mg) by mouth every 6 hours as needed for severe pain  Quantity: 6 tablet  Refills: 0     senna-docusate 8.6-50 MG tablet  Commonly known as: SENOKOT-S/PERICOLACE  This may have changed:     when to take this    additional instructions  Used for: S/P  section      Dose: 1 tablet  Take 1 tablet by mouth daily Start after delivery.  Quantity: 100 tablet  Refills: 0        CONTINUE these medicines which have NOT CHANGED      Dose / Directions   albuterol (2.5 MG/3ML) 0.083% neb solution  Commonly  known as: PROVENTIL      Dose: 2.5 mg  Take 1 vial (2.5 mg) by nebulization every 4 hours as needed for shortness of breath / dyspnea  Quantity:    Refills: 0     order for DME  Used for: Breast feeding status of mother      Equipment being ordered:Breast pump  Quantity: 1 Device  Refills: 0     prenatal multivitamin w/iron 27-0.8 MG tablet      Dose: 1 tablet  Take 1 tablet by mouth daily  Refills: 0        STOP taking    bisacodyl 10 MG suppository  Commonly known as: DULCOLAX        ceFAZolin 1 GM vial  Commonly known as: ANCEF        dextrose 5% lactated ringers 5 % infusion        fentaNYL (PF) 100 MCG/2ML injection  Commonly known as: SUBLIMAZE        hydrocortisone 2.5 % cream        ketorolac 30 MG/ML injection  Commonly known as: TORADOL        lactated ringers injection        lactated ringers Soln        lanolin ointment        lidocaine 1 % Soln        lidocaine 4 % kit  Commonly known as: LMX4        MEDICATION INSTRUCTION        naloxone 0.4 MG/ML injection  Commonly known as: NARCAN        ondansetron 2 MG/ML Soln injection  Commonly known as: ZOFRAN        ondansetron 4 MG ODT tab  Commonly known as: ZOFRAN-ODT        oxytocin 10 UNIT/ML injection  Commonly known as: PITOCIN        oxytocin in 0.9% NaCl 30 units/500 mL Soln infusion  Commonly known as: PITOCIN        simethicone 80 MG chewable tablet  Commonly known as: MYLICON        sodium chloride (PF) 0.9% PF flush        sodium phosphate 7-19 GM/118ML rectal enema        Tranexamic Acid Soln infusion  Commonly known as: CYCKLOKAPRON              Where to get your medicines      These medications were sent to [facility] [place], [place] - [address]  [address] [address]    Phone: [phone]     acetaminophen 325 MG tablet    ibuprofen 600 MG tablet    senna-docusate 8.6-50 MG tablet     Some of these will need a paper prescription and others can be bought over the counter. Ask your nurse if you  have questions.    Bring a paper prescription for each of these medications    oxyCODONE 5 MG tablet           Discharge/Disposition:  Deanna Leahy was discharged to home in stable condition with the following instructions/medications:  1) Call for temperature > 100.4, bright red vaginal bleeding >1 pad an hour x 2 hours, foul smelling vaginal discharge, pain not controlled by usual oral pain meds, persistent nausea and vomiting not controlled on medications, drainage or redness from incision site  2) She declined contraception, partner plans vasectomy and condoms until then.  3) For feeding she decided to breast feed.  4) She was instructed to follow-up with her primary OB in 6 weeks for a routine postpartum visit.  5) Discharge activity:  No heavy lifting >15 lbs or strenuous activity for 6 weeks, pelvic rest for 6 weeks, no driving or operating machinery while on narcotics.    TAHMINA Weber MD  Obstetrics and Gynecology, PGY-2  Ob-Gyn PGY-2 Pager: [phone]

## 2020-10-08 NOTE — PROGRESS NOTES
[facility] Obstetrics   Postpartum Progress Note    Name: Deanna Leahy  : 1995  MRN: [id number]    S: Patient is resting comfortably. Denies pain.  Bleeding is light. Passing flatus and voiding spontaneously. Ambulating without dizziness. Tolerating a regular diet without nausea/vomiting. Denies fevers/chills, headache, CP, SOB. She is pumping.    O:  Patient Vitals for the past 24 hrs:   BP Temp Temp src Pulse Resp SpO2   10/08/20 0846 99/68 98.6  F (37  C) Oral 101 16 98 %   10/08/20 0430 102/70 97.7  F (36.5  C) Oral 82 16 99 %   10/08/20 0027 98/66 98.1  F (36.7  C) Oral 80 16 100 %   10/07/20 2110 107/75 97.5  F (36.4  C) Oral 83 16 100 %   10/07/20 1715 113/74 98.5  F (36.9  C) Oral 110 16 --       Gen: NAD. Alert, oriented. Resting comfortably in bed.  CV: well perfused   Resp:  no increased work of breathing  Abd: Soft, appropriately tender, fundus firm; non-distended  Incision: c/d/i, no erythema or induration, no active drainage; no dressing   Ext: Trace lower extremity edema bilaterally     Labs:   HGB  Recent Labs   Lab 10/08/20  0659 10/07/20  0700   HGB 10.0* 12.0       A/P: Deanna Leahy is a 25 year old  female, now POD#1 s/p RLTCS for breech presentation and PPROM at outside hospital. Transferred here for NICU cares. Pregnancy was complicated by rh negative status. Stable in the postoperative period.      # Postoperative care  - Continue routine postoperative management  - Rh neg, Rubella immune, Hep B Immune - Rho fred ordered  - Pain: Continue ibuprofen/Tylenol scheduled.  Oxycodone PRN for breakthrough.  - Heme: HGB 12.0> QBL 1265mL> 10.0, asymptomatic.    - FEN/GI: regular diet, stool softeners PRN  - : Saavedra out, voiding spontaneously  - Contraception: undecided, considering a vasectomy  - Breastfeeding  - Baby doing well in NICU    Dispo: anticipate discharge to home on POD# 2-3.    TAHMINA Weber MD  OB-GYN Resident, PGY-2  PGY-2 Pager: [phone]  10/08/2020 8:41 AM      Appreciate note by Dr. Duran. Patient has been seen and examined by me separate from the resident, agree with above note.     Carmen Montez MD  11:18 AM

## 2020-10-08 NOTE — PLAN OF CARE
Patient has been comfortable with current pain regime. She was able to ambulate in the hallway and shower. Voiding spontaneously with clear colored urine and passing flatus.  She continues to pump for baby in NICU. Will continue with plan of care.

## 2020-10-08 NOTE — H&P
L&D History and Physical   2020   Danny Leahy  [id number]      HPI: Danny Leahy is a 25 year old  admitted as a postpartum transfer for NICU cares.     She states that she is feeling well today. She is urinating spontaneously. Per RN, there is no bloody discharge with fundal massage. Patient reports scant vaginal bleeding.  Was previously dizzy upon standing but this has resolved and is not dizzy or lightheaded when in bed. Reports good pain control and minimal abdominal pain or incisional pain. States it rates a 1/10 at present. Received a TAP block prior to discharge in [facility]. Patient denies Hx of asthma. Currently breastfeeding. Considering partner vasectomy as a contraception option as they are thinking about permanent sterilization but currently undecided.     She is passing gas and stool without problems. She is eating and drinking without difficulty. She denies headache, vision changes, chest pain, shortness of breath, fever, chills, nausea, vomiting or other systemic complaints.     Her pregnancy is notable for:  -Hx of previous  section for Geoffrey Breech presentation   -Rh negative status    ROS: No headaches, vision changes, nausea, vomiting, fevers, chills, chest pain, SOB, abdominal pain, constipation, diarrhea, dysuria, changes in vaginal discharge or edema in extremities noted.     OBHX:   OB History    Para Term  AB Living   3 2 1 1 1 0   SAB TAB Ectopic Multiple Live Births   0 0 0 0 0      # Outcome Date GA Lbr Juancho/2nd Weight Sex Delivery Anes PTL Lv   3  10/07/20 35w6d  3.289 kg (7 lb 4 oz) M          Name: JOSEMANUEL,MALE-DANNY      Apgar1: 3  Apgar5: 1   2 Term 18 39w0d  3.63 kg (8 lb) F CS-LTranv Spinal N       Name: Sona      Apgar1: 9  Apgar5: 10   1 AB 2017 4w0d             Obstetric Comments   EDC 2020 based on LMP.  Happy to be pregnant.   to Claudio.       Past Medical History:   Diagnosis Date      Supervision of high-risk pregnancy 2018       Past Surgical History:   Procedure Laterality Date      SECTION N/A 2018    Procedure:  SECTION;  multi select  Delivery;  Surgeon: Douglas Cardenas MD;  Location: [facility] L+[facility]      SECTION N/A 10/7/2020    Procedure:  section;  Surgeon: Alannah Carrillo DO;  Location: [facility] L+[facility]     HC TOOTH EXTRACTION W/FORCEP         Medications:        [COMPLETED] acetaminophen (TYLENOL) tablet 975 mg       [COMPLETED] azithromycin (ZITHROMAX) 500 mg in sodium chloride 0.9 % 250 mL intermittent infusion       [COMPLETED] bupivacaine liposome (EXPAREL) 1.3 % LA inj susp 20 mL       [COMPLETED] ceFAZolin (ANCEF) intermittent infusion 2 g in 100 mL dextrose PRE-MIX       [COMPLETED] lidocaine (PF) (XYLOCAINE) 1 % injection       [COMPLETED] sodium citrate-citric acid (BICITRA) solution 30 mL         acetaminophen (TYLENOL) 325 MG tablet, Take 3 tablets (975 mg) by mouth every 6 hours       albuterol (PROVENTIL) (2.5 MG/3ML) 0.083% neb solution, Take 1 vial (2.5 mg) by nebulization every 4 hours as needed for shortness of breath / dyspnea       [START ON 10/9/2020] bisacodyl (DULCOLAX) 10 MG suppository, Place 1 suppository (10 mg) rectally daily as needed for constipation Do not start before 2020.       ceFAZolin (ANCEF) 1 GM vial, Inject 1 g into the vein See Admin Instructions       dextrose 5% in lactated ringers infusion, Inject 1 mL into the vein continuous       fentaNYL, PF, (SUBLIMAZE) 100 MCG/2ML injection, Inject 0.5-1 mLs (25-50 mcg) into the vein every 2 hours as needed for severe pain       hydrocortisone 2.5 % cream, Place rectally 3 times daily as needed (hemorrhoids)       [START ON 10/8/2020] ibuprofen (ADVIL/MOTRIN) 800 MG tablet, Take 1 tablet (800 mg) by mouth every 6 hours Do not start before 2020.       ketorolac (TORADOL) 30 MG/ML injection, Inject 1 mL (30 mg) into the vein every 6 hours        lactated ringers infusion, Inject 100 mL/hr into the vein continuous       lactated ringers infusion, Inject 100 mL/hr into the vein continuous       lactated ringers SOLN, Inject 1,000 mLs into the vein once as needed (post partum hemorrhage (PPH))       lanolin ointment, Apply topically every hour as needed for dry skin (soreness)       lidocaine (LMX4) 4 % kit, Apply topically every hour as needed for pain (with VAD insertion or accessing implanted port.)       lidocaine 1 % SOLN, 0.1-1 mLs by Other route every hour as needed (mild pain with VAD insertion.)       MEDICATION INSTRUCTION, 1 each continuous prn       naloxone (NARCAN) 0.4 MG/ML injection, Inject 0.25-1 mLs (0.1-0.4 mg) into the vein once for 1 dose       ondansetron (ZOFRAN) 2 MG/ML SOLN injection, Inject 2 mLs (4 mg) into the vein every 6 hours as needed for nausea or vomiting       ondansetron (ZOFRAN-ODT) 4 MG ODT tab, Take 1 tablet (4 mg) by mouth every 30 minutes as needed for nausea       order for DME, Equipment being ordered:Breast pump (Patient not taking: Reported on 9/30/2020)       oxyCODONE (ROXICODONE) 5 MG tablet, Take 1 tablet (5 mg) by mouth every 4 hours as needed for moderate to severe pain       oxyCODONE (ROXICODONE) 5 MG tablet, Take 1 tablet (5 mg) by mouth every 4 hours as needed       oxytocin (PITOCIN) 10 UNIT/ML injection, Inject 1 mL (10 Units) into the muscle once as needed (postpartum hemorrhage (PPH). IF no IV access is available.)       oxytocin (PITOCIN) 30 units in 500 mL 0.9% NaCl infusion, Inject 100 mL/hr into the vein continuous       oxytocin (PITOCIN) 30 units in 500 mL 0.9% NaCl infusion, Inject 340 mL/hr into the vein continuous prn (for postpartum hemorrhage (PPH) UNTIL bleeding subsided)       Prenatal Vit-Fe Fumarate-FA (PRENATAL MULTIVITAMIN W/IRON) 27-0.8 MG tablet, Take 1 tablet by mouth daily       senna-docusate (SENOKOT-S/PERICOLACE) 8.6-50 MG tablet, Take 1 tablet by mouth 2 times daily        simethicone (MYLICON) 80 MG chewable tablet, Take 1 tablet (80 mg) by mouth 4 times daily as needed for other (gas)       sodium chloride, PF, 0.9% PF flush, 3 mLs by Intracatheter route every 12 hours       sodium chloride, PF, 0.9% PF flush, 3 mLs by Intracatheter route every 8 hours       [START ON 10/9/2020] sodium phosphate (FLEET ENEMA) 7-19 GM/118ML rectal enema, Place 1 Bottle (1 enema) rectally daily as needed for constipation ( ) Do not start before October 9, 2020.       tranexamic acid 1 g in 100 mL 0.7% NaCl IV bag (premix), Inject 100 mLs (1 g) into the vein every 30 minutes as needed (Post-partum hemorrhage (PPH))        Allergies   Allergen Reactions     No Known Drug Allergies        Family History   Problem Relation Age of Onset     C.A.D. Maternal Grandfather         50's?     Hyperlipidemia Mother      Anemia Mother      Crohn's Disease Father      Cancer Paternal Grandmother         lung     Prostate Cancer Paternal Grandfather      No Known Problems Daughter        SocialHX:   Social History     Tobacco Use     Smoking status: Never Smoker     Smokeless tobacco: Never Used   Substance Use Topics     Alcohol use: No     Drug use: No       ROS: 10-point ROS negative except as indicated in HPI.    Physical Exam:  Vitals:    10/07/20 1715 10/07/20 2110   BP: 113/74 107/75   Pulse: 110 83   Resp: 16 16   Temp: 98.5  F (36.9  C) 97.5  F (36.4  C)   TempSrc: Oral Oral   SpO2:  100%     General: alert, oriented female, resting in bed in NAD  CV: regular rate  Lungs: breathing normally on room air    Abdomen: soft, appropriately tender, c/d/i incision, fundus firm and palpable.  : Scant blood on pad   Extremities: bilateral lower extremities non-tender with trace edema        Prenatal Labs:   Lab Results   Component Value Date    ABO AB 10/07/2020    RH Neg 10/07/2020    AS Pos (A) 10/07/2020    HEPBANG Nonreactive 03/10/2020    CHPCRT Negative 03/02/2018    GCPCRT Negative 03/02/2018    TREPAB  Negative 2018    HGB 12.0 10/07/2020       GBS Status:   Lab Results   Component Value Date    GBS Negative 2018       Lab Results   Component Value Date    PAP NIL 2018       Labs:   Patient Vitals for the past 24 hrs:   BP Temp Temp src Pulse Resp SpO2   10/07/20 2110 107/75 97.5  F (36.4  C) Oral 83 16 100 %   10/07/20 1715 113/74 98.5  F (36.9  C) Oral 110 16 --         A/P: Deanna Leahy is a 25 year old  female, now POD#0 s/p LTCS for PPROM and Geoffrey Breech presentation and patient desired repeat CS. Hx of previous  section for breech. Uncomplicated pregnancy. Stable in the postoperative period.      # Postoperative care  - Continue routine postoperative management  - Rubella immune, Hep NR  -Rh status:  -Rh negative   -Antibody Screen pos  --Rhogam ordered  - Pain: Continue ibuprofen/Tylenol scheduled.  Oxycodone PRN for breakthrough.  - Heme: HGB 12.0> QBL 1265mL> HgB ordered for tomorrow morning, dizziness w ambulation initially, no sxs at present;  Will discharge with PO Fe if Hgb <10.  - FEN/GI: regular diet, stool softeners PRN  - : Saavedra out, voiding spontaneously; urine bright red in color.  over last 4 hours.   - Contraception: Undecided but discussed potential partner vasectomy; re-visit at 6wk PP visit   - Breastfeeding   - Baby in NICU     Dispo: anticipate discharge to home on POD#2-3.    Lilia Trujillo, MS3     Patient discussed with Dr. Jett.     Resident/Fellow Attestation   I, Sara Duran, was present with the medical student who participated in the service and in the documentation of the note.  I have verified the history and personally performed the physical exam and medical decision making.  I agree with the assessment and plan of care as documented in the note and have edited it where needed.     TAHMINA Weber MD  Obstetrics and Gynecology, PGY-2  Ob-Gyn PGY-2 Pager: [phone]     I have seen and discussed  the patient with the resident. I have reviewed, edited, and agree with the note.     Laura Jett MD

## 2020-10-08 NOTE — CONSULTS
"[facility]  MATERNAL CHILD HEALTH   INITIAL NICU PSYCHOSOCIAL ASSESSMENT      DATA:      Presenting Information: Mom is a  who delivered an infant male \"Wilfrido\" on 10/07/2020 at 35w6d gestation in the setting of PPROM and breech presentation. Baby was admitted to the NICU for RDS and possible sepsis.  SW was consulted to meet with this family per NICU admission of infant.      Living Situation: Parents are Rasheeda () who live together in [place], along with their two year old daughter.     Social Support: Parents report having good support from family and friends.     Mental Health History: None indicated     History of Postpartum Mood Disorders: None indicated     Chemical Health History: None indicated     Legal/Child Protection Involvement: None indicated     INTERVENTION:        Chart review    Collaboration with team: MARCO Hanson    Conducted Psychosocial Assessment    Introduction to Maternal Child Health SW role and scope of practice    Orientation to the NICU (parking, lodging, meals, visitation)    Validated emotions and provided supportive listening    Provided SW contact info     ASSESSMENT:      Coping: Both parents are engaged and appear in good spirits. They admit the birth and immediate time afterward was difficult, but now that baby is doing well and they have received a medical update, they feel better prepared for the hospitalization.  They are hopeful about an extubation later today and understand that breathing and eating are Wilfrido's primary goals for discharge. They feel good about their support and their primary concern is boarding once Deanna is discharged, likely tomorrow.     Risk Factors: Unexpected NICU admission, birth during global pandemic, distance from home (~45 miles)     Resiliency Factors & Strengths: Strong family support, demonstrated positive coping, housing stability, experienced parents.     PLAN:      SW will " continue to follow for supportive intervention.     BARB Murillo, LGSW  Clinical   Maternal Child Health  [facility]  Direct: [phone]  Pager: [phone]  After Hours SW: [phone]

## 2020-10-08 NOTE — PROVIDER NOTIFICATION
10/07/20 3881   Provider Notification   Provider Name/Title Dr. Duran   Method of Notification In Department   Notification Reason Status Update   Notified provider that patient's urine has been bright red, fundal checks are minimal to scant bleeding. Patient voiding spontaneously with no problem. Plan to increase intake of oral fluids and continue to monitor closely. Also spoke to provider about patient needing a dose of rhogam in postpartum due to being RH (-) and  being RH (+).

## 2020-10-08 NOTE — ANESTHESIA POSTPROCEDURE EVALUATION
Patient: Deanna Leahy    Procedure(s):   section    Diagnosis:Encounter for supervision of other normal pregnancy, third trimester [Z34.83]  Diagnosis Additional Information: No value filed.    Anesthesia Type:  Spinal, Peripheral Nerve Block, MAC    Note:  Anesthesia Post Evaluation    Patient location: Chart review as patient transferred to [place] to accompany   for NICU cares.  Patient participation: Able to fully participate in evaluation (Fully awake for transfer to [place] via BLS with paramedics.)  Level of consciousness: awake and alert  Pain management: satisfactory to patient (Patient rates pain as 1 on 1-10 pain scale upon arrival to [place] upon chart review.)  multimodal analgesia used between 6 hours prior to anesthesia start to PACU discharge (TAP block appears effective as well as ITN narcotics given with spinal.)Airway patency: patent  Cardiovascular status: stable  Respiratory status: spontaneous ventilation, nonlabored ventilation and room air  Hydration status: stable  PONV: none     Anesthetic complications: None    Comments: Pt transferred to [place] to accompany  for further NICU cares.  Pt comfortable and without noted anesthesia related problems or complications.        Last vitals:  Vitals:    10/07/20 1300 10/07/20 1400 10/07/20 1500   BP: 110/74 123/65 108/70   Pulse: 103 100 109   Resp:  18 18   Temp:  97.1  F (36.2  C) 97.6  F (36.4  C)   SpO2:  99% 100%         Electronically Signed By: JERICHO Arzate CRNA  2020  5:42 AM

## 2020-10-09 VITALS
DIASTOLIC BLOOD PRESSURE: 75 MMHG | RESPIRATION RATE: 16 BRPM | TEMPERATURE: 98.3 F | HEART RATE: 97 BPM | SYSTOLIC BLOOD PRESSURE: 102 MMHG | OXYGEN SATURATION: 98 %

## 2020-10-09 LAB
ABO + RH BLD: NORMAL
ABO + RH BLD: NORMAL
BLOOD BANK CMNT PATIENT-IMP: NORMAL
DATE RH IMM GL GVN: NORMAL
FETAL CELL SCN BLD QL ROSETTE: NORMAL
RH IG VIALS RECOM PATIENT: NORMAL

## 2020-10-09 PROCEDURE — 99238 HOSP IP/OBS DSCHRG MGMT 30/<: CPT | Mod: GC | Performed by: OBSTETRICS & GYNECOLOGY

## 2020-10-09 PROCEDURE — 250N000013 HC RX MED GY IP 250 OP 250 PS 637: Performed by: STUDENT IN AN ORGANIZED HEALTH CARE EDUCATION/TRAINING PROGRAM

## 2020-10-09 RX ORDER — IBUPROFEN 600 MG/1
600 TABLET, FILM COATED ORAL EVERY 6 HOURS PRN
Qty: 60 TABLET | Refills: 0 | Status: SHIPPED | OUTPATIENT
Start: 2020-10-09 | End: 2020-11-18

## 2020-10-09 RX ORDER — OXYCODONE HYDROCHLORIDE 5 MG/1
5 TABLET ORAL EVERY 6 HOURS PRN
Qty: 6 TABLET | Refills: 0 | Status: SHIPPED | OUTPATIENT
Start: 2020-10-09 | End: 2020-10-12

## 2020-10-09 RX ORDER — AMOXICILLIN 250 MG
1 CAPSULE ORAL DAILY
Qty: 100 TABLET | Refills: 0 | Status: SHIPPED | OUTPATIENT
Start: 2020-10-09 | End: 2020-11-18

## 2020-10-09 RX ORDER — ACETAMINOPHEN 325 MG/1
650 TABLET ORAL EVERY 6 HOURS PRN
Qty: 100 TABLET | Refills: 0 | Status: SHIPPED | OUTPATIENT
Start: 2020-10-09 | End: 2020-11-18

## 2020-10-09 RX ADMIN — IBUPROFEN 800 MG: 800 TABLET ORAL at 13:31

## 2020-10-09 RX ADMIN — ACETAMINOPHEN 975 MG: 325 TABLET, FILM COATED ORAL at 07:45

## 2020-10-09 RX ADMIN — ACETAMINOPHEN 975 MG: 325 TABLET, FILM COATED ORAL at 13:31

## 2020-10-09 RX ADMIN — IBUPROFEN 800 MG: 800 TABLET ORAL at 01:13

## 2020-10-09 RX ADMIN — OXYCODONE HYDROCHLORIDE 5 MG: 5 TABLET ORAL at 01:13

## 2020-10-09 RX ADMIN — SENNOSIDES AND DOCUSATE SODIUM 1 TABLET: 8.6; 5 TABLET ORAL at 07:45

## 2020-10-09 RX ADMIN — ACETAMINOPHEN 975 MG: 325 TABLET, FILM COATED ORAL at 01:13

## 2020-10-09 RX ADMIN — IBUPROFEN 800 MG: 800 TABLET ORAL at 07:46

## 2020-10-09 NOTE — PROGRESS NOTES
[facility] Obstetrics   Postpartum Progress Note    Name: Deanna Leahy  : 1995  MRN: [id number]    S: Patient is resting comfortably. Denies pain.  Bleeding is light. Passing flatus and voiding spontaneously. Ambulating without dizziness. Tolerating a regular diet without nausea/vomiting. Denies fevers/chills, headache, CP, SOB. She is pumping.    O:  Patient Vitals for the past 24 hrs:   BP Temp Temp src Pulse Resp SpO2   10/09/20 0747 102/75 98.3  F (36.8  C) Oral 97 16 --   10/09/20 0000 101/69 97.6  F (36.4  C) Oral 98 16 --   10/08/20 1729 103/71 98.4  F (36.9  C) Oral 95 16 --   10/08/20 0846 99/68 98.6  F (37  C) Oral 101 16 98 %       Gen: NAD. Alert, oriented. Resting comfortably in bed  CV: well perfused     Resp:  no increased work of breathing  Abd: Soft, appropriately tender, fundus firm; non-distended  Incision: c/d/i, no erythema or induration, no active drainage; no dressing   Ext: Trace lower extremity edema bilaterally     Labs:   HGB  Recent Labs   Lab 10/08/20  0659 10/07/20  0700   HGB 10.0* 12.0       A/P: Deanna Leahy is a 25 year old  female, now POD#2 s/p RLTCS for breech presentation and PPROM at an outside hospital. Transferred here for NICU cares. Pregnancy was complicated by rh negative status, rhogam received here. Stable in the postoperative period     # Postoperative care  - Continue routine postoperative management  - Rh neg, Rubella immune, Hep B Immune, Rhogam given 10/8   - Pain: Continue ibuprofen/Tylenol scheduled.  Oxycodone PRN for breakthrough.  - Heme: HGB 12.0> QBL 1265mL> 10.0, asymptomatic.    - FEN/GI: regular diet, stool softeners PRN  - : Saavedra out, voiding spontaneously  - Contraception: undecided, considering a vasectomy  - Breastfeeding  - Baby doing well in NICU    Dispo: anticipate discharge to home today    TAHMINA Weber MD  Obstetrics and Gynecology, PGY-2  Ob-Gyn PGY-2 Pager: [phone]       Appreciate note by Dr. Duran.  Patient is POD#2 s/p rltcs after pprom. hgb 10, patient asymptomatic from acute blood loss.   I discussed warning signs regarding postpartum depression, preeclampsia, bleeding and infection. All questions were answered.     Deja Del Angel MD 10/9/2020 11:32 AM

## 2020-10-09 NOTE — LACTATION NOTE
"This note was copied from a baby's chart.  D:  I met with Deanna and Tyler. Wilfrido is their second baby, mom pumped and bottled first child for 8months after failed latching. Her goal is to latch Wilfrido. She is normally in good health, takes no medications, and has no history of breast/chest surgery or trauma.  She has already started to pump expressing 10ml every 3-4hours.  I:  I gave her a folder of introductory materials and went over pumping guidelines.  I reviewed physiology of colostrum and milk production, pumping guidelines, and I gave her a log and encouraged her to use it; she has already downloaded a pumping log cici to her phone.  I explained how to access the videos \"Hand Expression\" and \"Maximizing Milk Production\"; as well as other helpful books and websites.   We discussed hands-on pumping techniques and usefulness of a hands-free pumping bra; she has already ordered a pumping bra.  We discussed skin to skin holding and how to reach your breastfeeding goals.  We talked about medications during breastfeeding.  She verbalized understanding via teachback.  I advised her to call her insurance company about pump coverage if she would like a hospital grade pump; she has a new Spectra pump and a gently used Pump in Style at home as well.   A:  Mom has information she needs to initiate her supply.   P:  Will continue to provide lactation support.  Tena Triplett, RNC, IBCLC       "

## 2020-10-09 NOTE — PLAN OF CARE
VSS, postpartum assessment WDL. Pt ambulating and voiding. Tylenol, ibuprofen and oxycodone for pain relief. Pumping for infant in NICU. Continue with plan of care.

## 2020-10-09 NOTE — PLAN OF CARE
Stable and discharge to home today . Discharge education and instructions reviewed and questions were addressed.

## 2020-10-12 NOTE — PROGRESS NOTES
"Deanna Leahy  Gender: female  : 1995  [address] [facility] [address]  [phone] (home)   Medical Record: [id number]  Primary Care Provider: Romulo Basilio       [employer]   ?   Discharge Phone Call: Key Words/Key Times     How are you and the baby?     How are feedings going?     Voiding & Stooling?     Any questions or concerns?     Follow-up appointment?       We want to provide excellent care here at [facility]. Do you have any feedback for us that would help us improve?     Call back COMMENTS:   Attempted to call pt for a PP followup phone call, but unable to be reached. Message left to call us if having any question or concerns. Both pt and  were transferred to [facility]. Will try again tomorrow.Patient return my call, States having some burning with urination, denies any fevvrs. Encouraged to call PCP  And to drink cranberry juice often. Mother states, \"Wilfrido is off the vent since 10/10. And CPAP since. Currently doing well hoping CPAP to be discontinued today.Currently taking 25mls of pumped breast milk every feeding. Will start breast feeding tomorrow if he tolerated discontinuing of the CPAP. Hoping to discharge by the end of the week.      Attempted Calls:   __X1  _______     __________  "

## 2020-10-13 ENCOUNTER — MYC MEDICAL ADVICE (OUTPATIENT)
Dept: FAMILY MEDICINE | Facility: OTHER | Age: 25
End: 2020-10-13

## 2020-10-13 DIAGNOSIS — R30.0 BURNING WITH URINATION: Primary | ICD-10-CM

## 2020-10-14 NOTE — TELEPHONE ENCOUNTER
Called to discuss with patient.     Patient states that she is having intermittent burning with urination and some low achy back pain. He pain is rated 2/10. The back pain occurs in the evening. Patient denies having any fevers, sob, chest pain, urine odor, vaginal discharge, vaginal itching, vaginal odor.     PLAN:   Patient was scheduled on  schedule for a phone visit. UA orders placed.   Aquilino Hutson RN  October 14, 2020

## 2020-10-15 ENCOUNTER — VIRTUAL VISIT (OUTPATIENT)
Dept: FAMILY MEDICINE | Facility: OTHER | Age: 25
End: 2020-10-15
Payer: COMMERCIAL

## 2020-10-15 ENCOUNTER — TELEPHONE (OUTPATIENT)
Dept: FAMILY MEDICINE | Facility: OTHER | Age: 25
End: 2020-10-15

## 2020-10-15 DIAGNOSIS — R30.0 BURNING WITH URINATION: ICD-10-CM

## 2020-10-15 DIAGNOSIS — N30.01 ACUTE CYSTITIS WITH HEMATURIA: Primary | ICD-10-CM

## 2020-10-15 LAB
ALBUMIN UR-MCNC: NEGATIVE MG/DL
APPEARANCE UR: CLEAR
BACTERIA #/AREA URNS HPF: ABNORMAL /HPF
BILIRUB UR QL STRIP: NEGATIVE
COLOR UR AUTO: YELLOW
GLUCOSE UR STRIP-MCNC: NEGATIVE MG/DL
HGB UR QL STRIP: ABNORMAL
KETONES UR STRIP-MCNC: NEGATIVE MG/DL
LEUKOCYTE ESTERASE UR QL STRIP: ABNORMAL
NITRATE UR QL: NEGATIVE
NON-SQ EPI CELLS #/AREA URNS LPF: ABNORMAL /LPF
PH UR STRIP: 6.5 PH (ref 5–7)
RBC #/AREA URNS AUTO: ABNORMAL /HPF
SOURCE: ABNORMAL
SP GR UR STRIP: <=1.005 (ref 1–1.03)
UROBILINOGEN UR STRIP-ACNC: 0.2 EU/DL (ref 0.2–1)
WBC #/AREA URNS AUTO: ABNORMAL /HPF

## 2020-10-15 PROCEDURE — 87088 URINE BACTERIA CULTURE: CPT | Performed by: FAMILY MEDICINE

## 2020-10-15 PROCEDURE — 99213 OFFICE O/P EST LOW 20 MIN: CPT | Mod: TEL | Performed by: FAMILY MEDICINE

## 2020-10-15 PROCEDURE — 81001 URINALYSIS AUTO W/SCOPE: CPT | Performed by: FAMILY MEDICINE

## 2020-10-15 PROCEDURE — 87086 URINE CULTURE/COLONY COUNT: CPT | Performed by: FAMILY MEDICINE

## 2020-10-15 PROCEDURE — 87186 SC STD MICRODIL/AGAR DIL: CPT | Performed by: FAMILY MEDICINE

## 2020-10-15 RX ORDER — CEPHALEXIN 500 MG/1
500 CAPSULE ORAL 3 TIMES DAILY
Qty: 30 CAPSULE | Refills: 0 | Status: SHIPPED | OUTPATIENT
Start: 2020-10-15 | End: 2020-10-25

## 2020-10-15 NOTE — RESULT ENCOUNTER NOTE
Deanna,    Your urine appears to possibly be infected.  It might be good to do an E visit or virtual visit to go over your symptoms.  Some of these findings could be related to being postpartum and having postpartum bleeding.    Have a nice day!    Dr. Basilio

## 2020-10-15 NOTE — TELEPHONE ENCOUNTER
Called patient left message to call clinic back. When call is returned please relay message below. Per Dr Basilio patient had a virtual visit scheduled yesterday but looks it was cancelled. Provider would like  to know if patient would like to still have a virtual visit for her symptoms. He does have an opening at 1145 today for virtual visit.     Gaetano David MA on 10/15/2020 at 11:31 AM

## 2020-10-15 NOTE — PROGRESS NOTES
"Deanna Leahy is a 25 year old female who is being evaluated via a billable telephone visit.      The patient has been notified of following:     \"This telephone visit will be conducted via a call between you and your physician/provider. We have found that certain health care needs can be provided without the need for a physical exam.  This service lets us provide the care you need with a short phone conversation.  If a prescription is necessary we can send it directly to your pharmacy.  If lab work is needed we can place an order for that and you can then stop by our lab to have the test done at a later time.    Telephone visits are billed at different rates depending on your insurance coverage. During this emergency period, for some insurers they may be billed the same as an in-person visit.  Please reach out to your insurance provider with any questions.    If during the course of the call the physician/provider feels a telephone visit is not appropriate, you will not be charged for this service.\"    Patient has given verbal consent for Telephone visit?  Yes    What phone number would you like to be contacted at? [phone]    How would you like to obtain your AVS? Tracy Espinal     Deanna Leahy is a 25 year old female who presents via phone visit today for the following health issues:    HPI     Triage Note from 10/14/20  Patient states that she is having intermittent burning with urination and some low achy back pain. He pain is rated 2/10. The back pain occurs in the evening. Patient denies having any fevers, sob, chest pain, urine odor, vaginal discharge, vaginal itching, vaginal odor.   PLAN:   Scheduled with - Urine was left. Appointment was cancelled and was offered an appointment time with  today (10/15/20), but patient didn't get back to us in time. Scheduled with  to discuss results.     Spoke to patient on 10/15/20 and stated that today her symptoms " are feeling better than yesterday. She has been increasing her fluid intake. However, she is still having some intermittent burning with urination.        Review of Systems   Constitutional, HEENT, cardiovascular, pulmonary, GI, , musculoskeletal, neuro, skin, endocrine and psych systems are negative, except as otherwise noted.       Objective          Vitals:  No vitals were obtained today due to virtual visit.    healthy, alert and no distress  PSYCH: Alert and oriented times 3; coherent speech, normal   rate and volume, able to articulate logical thoughts, able   to abstract reason, no tangential thoughts, no hallucinations   or delusions  Her affect is normal  RESP: No cough, no audible wheezing, able to talk in full sentences  Remainder of exam unable to be completed due to telephone visits    Assessment/Plan:    1. Acute cystitis with hematuria  U/a with signs of active infection.  about 1 week ago, denies any signs of infection along the incision. Normal bowel movements. Currently breast feeding. Will start on keflex while awaiting cx to treat acute cystitis .  - cephALEXin (KEFLEX) 500 MG capsule; Take 1 capsule (500 mg) by mouth 3 times daily for 10 days  Dispense: 30 capsule; Refill: 0      Phone call duration: 5:31sec

## 2020-10-17 LAB
BACTERIA SPEC CULT: ABNORMAL
BACTERIA SPEC CULT: ABNORMAL
Lab: ABNORMAL
SPECIMEN SOURCE: ABNORMAL

## 2020-10-17 NOTE — RESULT ENCOUNTER NOTE
Deanna,    Your infection should respond to the prescribed antibiotics.  Let me know if you need anything else.    Have a nice day!    Dr. Basilio

## 2020-10-18 ENCOUNTER — LACTATION ENCOUNTER (OUTPATIENT)
Age: 25
End: 2020-10-18

## 2020-10-18 NOTE — LACTATION NOTE
This note was copied from a baby's chart.  D: I met with Deanna for discharge teaching. Mom is primarily pumping and bottling, plans to transition to some breastfeeding at home. Her supply is stable at over 40oz pumped per day.   I: I gave her a feeding log to use at home and went over the need for 8-12 feedings per day and how many wet diapers and stools she should see each day to show adequate intake. We discussed home storage of breast milk, weaning from the nipple shield and pumping, and transitioning to full breastfeeding at home.  I gave the mother handouts on all of these topics as well as extra nipple shields. We discussed growth spurts, birth control and other medications, paced bottlefeeding, Babyweigh rental scales, and resources for help at home/ when to seek outpatient help.  She verbalized understanding via teach back.   A: Mom has information and equipment she needs to feed her baby at home.   P: I encouraged her to call with any breastfeeding questions she may have in the future.

## 2020-10-21 ENCOUNTER — NURSE TRIAGE (OUTPATIENT)
Dept: NURSING | Facility: CLINIC | Age: 25
End: 2020-10-21

## 2020-10-21 ENCOUNTER — OFFICE VISIT (OUTPATIENT)
Dept: URGENT CARE | Facility: URGENT CARE | Age: 25
End: 2020-10-21
Payer: COMMERCIAL

## 2020-10-21 ENCOUNTER — HOSPITAL ENCOUNTER (EMERGENCY)
Facility: CLINIC | Age: 25
Discharge: HOME | End: 2020-10-21
Attending: PHYSICIAN ASSISTANT | Admitting: PHYSICIAN ASSISTANT
Payer: COMMERCIAL

## 2020-10-21 ENCOUNTER — APPOINTMENT (OUTPATIENT)
Dept: ULTRASOUND IMAGING | Facility: CLINIC | Age: 25
End: 2020-10-21
Attending: PHYSICIAN ASSISTANT
Payer: COMMERCIAL

## 2020-10-21 VITALS
SYSTOLIC BLOOD PRESSURE: 108 MMHG | DIASTOLIC BLOOD PRESSURE: 77 MMHG | HEART RATE: 90 BPM | HEIGHT: 62 IN | OXYGEN SATURATION: 98 % | RESPIRATION RATE: 18 BRPM | TEMPERATURE: 99.2 F | WEIGHT: 134 LBS | BODY MASS INDEX: 24.66 KG/M2

## 2020-10-21 VITALS
SYSTOLIC BLOOD PRESSURE: 102 MMHG | TEMPERATURE: 97 F | WEIGHT: 134 LBS | RESPIRATION RATE: 14 BRPM | HEART RATE: 72 BPM | BODY MASS INDEX: 24.51 KG/M2 | DIASTOLIC BLOOD PRESSURE: 60 MMHG

## 2020-10-21 DIAGNOSIS — N93.9 UTERINE BLEEDING: ICD-10-CM

## 2020-10-21 DIAGNOSIS — N93.9 VAGINAL BLEEDING: Primary | ICD-10-CM

## 2020-10-21 LAB
ALBUMIN UR-MCNC: NEGATIVE MG/DL
APPEARANCE UR: CLEAR
BASOPHILS # BLD AUTO: 0 10E9/L (ref 0–0.2)
BASOPHILS NFR BLD AUTO: 0.4 %
BILIRUB UR QL STRIP: NEGATIVE
COLOR UR AUTO: ABNORMAL
DIFFERENTIAL METHOD BLD: NORMAL
EOSINOPHIL # BLD AUTO: 0.1 10E9/L (ref 0–0.7)
EOSINOPHIL NFR BLD AUTO: 1.5 %
ERYTHROCYTE [DISTWIDTH] IN BLOOD BY AUTOMATED COUNT: 11.9 % (ref 10–15)
GLUCOSE UR STRIP-MCNC: NEGATIVE MG/DL
HCT VFR BLD AUTO: 38.4 % (ref 35–47)
HGB BLD-MCNC: 12.1 G/DL (ref 11.7–15.7)
HGB UR QL STRIP: ABNORMAL
IMM GRANULOCYTES # BLD: 0 10E9/L (ref 0–0.4)
IMM GRANULOCYTES NFR BLD: 0.2 %
KETONES UR STRIP-MCNC: NEGATIVE MG/DL
LEUKOCYTE ESTERASE UR QL STRIP: NEGATIVE
LYMPHOCYTES # BLD AUTO: 2.2 10E9/L (ref 0.8–5.3)
LYMPHOCYTES NFR BLD AUTO: 27.3 %
MCH RBC QN AUTO: 27.8 PG (ref 26.5–33)
MCHC RBC AUTO-ENTMCNC: 31.5 G/DL (ref 31.5–36.5)
MCV RBC AUTO: 88 FL (ref 78–100)
MONOCYTES # BLD AUTO: 0.6 10E9/L (ref 0–1.3)
MONOCYTES NFR BLD AUTO: 7.1 %
MUCOUS THREADS #/AREA URNS LPF: PRESENT /LPF
NEUTROPHILS # BLD AUTO: 5.1 10E9/L (ref 1.6–8.3)
NEUTROPHILS NFR BLD AUTO: 63.5 %
NITRATE UR QL: NEGATIVE
NRBC # BLD AUTO: 0 10*3/UL
NRBC BLD AUTO-RTO: 0 /100
PH UR STRIP: 6 PH (ref 5–7)
PLATELET # BLD AUTO: 390 10E9/L (ref 150–450)
RBC # BLD AUTO: 4.35 10E12/L (ref 3.8–5.2)
RBC #/AREA URNS AUTO: <1 /HPF (ref 0–2)
SOURCE: ABNORMAL
SP GR UR STRIP: 1.01 (ref 1–1.03)
SQUAMOUS #/AREA URNS AUTO: <1 /HPF (ref 0–1)
UROBILINOGEN UR STRIP-MCNC: 0 MG/DL (ref 0–2)
WBC # BLD AUTO: 8 10E9/L (ref 4–11)
WBC #/AREA URNS AUTO: 1 /HPF (ref 0–5)

## 2020-10-21 PROCEDURE — 81001 URINALYSIS AUTO W/SCOPE: CPT | Performed by: PHYSICIAN ASSISTANT

## 2020-10-21 PROCEDURE — 99285 EMERGENCY DEPT VISIT HI MDM: CPT | Performed by: PHYSICIAN ASSISTANT

## 2020-10-21 PROCEDURE — 76830 TRANSVAGINAL US NON-OB: CPT

## 2020-10-21 PROCEDURE — 85025 COMPLETE CBC W/AUTO DIFF WBC: CPT | Performed by: PHYSICIAN ASSISTANT

## 2020-10-21 PROCEDURE — 99284 EMERGENCY DEPT VISIT MOD MDM: CPT | Mod: 25 | Performed by: PHYSICIAN ASSISTANT

## 2020-10-21 PROCEDURE — 99215 OFFICE O/P EST HI 40 MIN: CPT | Performed by: NURSE PRACTITIONER

## 2020-10-21 ASSESSMENT — PAIN SCALES - GENERAL: PAINLEVEL: NO PAIN (0)

## 2020-10-21 ASSESSMENT — MIFFLIN-ST. JEOR: SCORE: 1306.07

## 2020-10-21 NOTE — TELEPHONE ENCOUNTER
Pt called stated she had  2 weeks ago, and noticed while urinating and walking today. Pt reported small blood cloths, and dark red blood. Pt denied pain, and fever. Pt stated she is exclusively breastfeeding. Pt advised to be seen within 24 hours, sated she will go to near by urgent care. RN advised to call back with any changes, worsening of symptoms, and questions or concerns. Patient verbalized understanding.    Ren Henning RN  [facility]     COVID 19 Nurse Triage Plan/Patient Instructions    Please be aware that novel coronavirus (COVID-19) may be circulating in the community. If you develop symptoms such as fever, cough, or SOB or if you have concerns about the presence of another infection including coronavirus (COVID-19), please contact your health care provider or visit [email or web address].     Disposition/Instructions    In-Person Visit with provider recommended. Reference Visit Selection Guide.    Thank you for taking steps to prevent the spread of this virus.  o Limit your contact with others.  o Wear a simple mask to cover your cough.  o Wash your hands well and often.    Resources    [facility]: About COVID-19: [email or web address]    CDC: What to Do If You're Sick: www.cdc.gov/coronavirus/2019-ncov/about/steps-when-sick.html    CDC: Ending Home Isolation: www.cdc.gov/coronavirus/2019-ncov/hcp/disposition-in-home-patients.html     CDC: Caring for Someone: www.cdc.gov/coronavirus/2019-ncov/if-you-are-sick/care-for-someone.html     [facility]: Interim Guidance for Hospital Discharge to Home: www.health.[facility].mn.us/diseases/coronavirus/hcp/hospdischarge.pdf    [facility] clinical trials (COVID-19 research studies): clinicalaffairs.[facility].edu/umn-clinical-trials     Below are the COVID-19 hotlines at the [place] Department of Health ([facility]). Interpreters are available.   o For health questions: Call 271-178-2643 or 1-489.418.9989 (7 a.m. to 7 p.m.)  o For  questions about schools and childcare: Call 214-111-9580 or 1-353.301.3383 (7 a.m. to 7 p.m.)                           Additional Information    Negative: Sounds like a life-threatening emergency to the triager    Negative: Shock suspected (e.g., cold/pale/clammy skin, too weak to stand, low BP, rapid pulse)    Negative: Difficult to awaken or acting confused (e.g., disoriented, slurred speech)    Negative: Passed out (i.e., lost consciousness, collapsed and was not responding)    Negative: Sounds like a life-threatening emergency to the triager    Negative: SEVERE dizziness (e.g., unable to stand, requires support to walk, feels like passing out now)    Negative: [1] SEVERE abdominal pain AND [2] present > 1 hour    Negative: Fever > 100.4 F (38.0 C)    Negative: SEVERE vaginal bleeding (i.e., soaking 2 pads or tampons per hour and present 2 or more hours)    Negative: Patient sounds very sick or weak to the triager    Negative: MODERATE vaginal bleeding (i.e., soaking 1 pad or tampon per hour and present > 6 hours)    Negative: [1] Constant abdominal pain AND [2] present > 2 hours    Negative: Pale skin (pallor) of new onset or worsening    Negative: Foul smelling vaginal discharge (i.e., lochia)    Negative: Bleeding with > 6 soaked pads per day    [1] Passing blood clots  AND [2] persists > 4 days postpartum    Protocols used: POSTPARTUM -  SYMPTOMS-A-AH, POSTPARTUM - VAGINAL BLEEDING AND LOCHIA-A-AH

## 2020-10-21 NOTE — ED AVS SNAPSHOT
[facility] Emergency Dept  [address] [address]  Phone: [phone]  Fax: [phone]                                    Deanna Leahy   MRN: [id number]    Department: [facility] Emergency Dept   Date of Visit: 10/21/2020           After Visit Summary Signature Page    I have received my discharge instructions, and my questions have been answered. I have discussed any challenges I see with this plan with the nurse or doctor.    ..........................................................................................................................................  Patient/Patient Representative Signature      ..........................................................................................................................................  Patient Representative Print Name and Relationship to Patient    ..................................................               ................................................  Date                                   Time    ..........................................................................................................................................  Reviewed by Signature/Title    ...................................................              ..............................................  Date                                               Time          22EPIC Rev 08/18

## 2020-10-21 NOTE — PROGRESS NOTES
Subjective     Deanna Leahy is a 25 year old female who presents to clinic today for the following health issues:    HPI          Chief Complaint   Patient presents with     Vaginal Problem      2 weeks ago, bleeding stopped but has restarted and passing blood clots [place] to santiago size       No fever, sob, dizziness, weakness. Has soaked about 4 panty liners since about noon. Worse after she sits for awhile and gets up.      Review of Systems   Constitutional, HEENT, cardiovascular, pulmonary, GI, , musculoskeletal, neuro, skin, endocrine and psych systems are negative, except as otherwise noted.      Objective    /60 (BP Location: Right arm, Patient Position: Sitting, Cuff Size: Adult Regular)   Pulse 72   Temp 97  F (36.1  C) (Tympanic)   Resp 14   Wt 60.8 kg (134 lb)   LMP 2020 (Exact Date)   Breastfeeding Yes   BMI 24.51 kg/m    Body mass index is 24.51 kg/m .  Physical Exam   GENERAL: healthy, alert and no distress, nontoxic in appearance  EYES: Eyes grossly normal to inspection, PERRL and conjunctivae and sclerae normal  HENT: normocephalic  NECK: supple with full ROM  RESP: lungs clear to auscultation - no rales, rhonchi or wheezes  CV: regular rate and rhythm, normal S1 S2, no S3 or S4, no murmur, click or rub, no peripheral edema   ABDOMEN: soft, nontender, still mildly enlarged secondary to pregnancy  MS: no gross musculoskeletal defects noted, no edema  No rash    No results found for this or any previous visit (from the past 24 hour(s)).        Assessment & Plan  This could simply be normal post partum lochia but due to the fact that she had a  just 2 weeks ago I am going to send her to ER for further evaluation. This is her second baby and a repeat . This baby came 1 month early as her water broke but he was 7 pounds at delivery. [facility] did stay in [facility]  unit [facility] due to his lungs needing help. She also was dx with UTI 5 days ago and  "treated.   Problem List Items Addressed This Visit     None      Visit Diagnoses     Vaginal bleeding    -  Primary    Postpartum hemorrhage, unspecified type                 BMI:   Estimated body mass index is 24.51 kg/m  as calculated from the following:    Height as of 10/7/20: 1.575 m (5' 2\").    Weight as of this encounter: 60.8 kg (134 lb).   Weight management plan: Patient was referred to their PCP to discuss a diet and exercise plan.           Patient Instructions   Go directly to the [place] ER for further evaluation.    No follow-ups on file.    JERICHO Duran CNP  [facility]    "

## 2020-10-22 NOTE — ED PROVIDER NOTES
History     Chief Complaint   Patient presents with     Vaginal Bleeding     2 weeks post partum, c section, have some vaginal bleeding with clots. sent from [facility]  Deanna Leahy is a 25 year old female who presents to the emergency department with concern over vaginal bleeding with clots that she noted earlier today.  She reports she has saturated 3 pantiliners since approximately noon with small clots up to nickel size.  She did have C section for breech presentation on 10/7/20 after having PROM.  She reported expected bleeding for approximately 1 week following this which then resolved spontaneously for approximately 7 days until it recurred again earlier today. Bleeding is increased when she urinates and after she has been sitting for extended periods of time.  She denies any significant pain associated with this.  She denies any fever, chills, myalgias, cough, dyspnea, wheezing, chest pain, palpitations, nausea, vomiting, significant abdominal pain, vaginal itching or discharge.  She also denies any current dysuria, frequency, urgency or hematuria however notes that she is currently on antibiotic for urinary tract infection when she had symptoms earlier this week.       Allergies:  Allergies   Allergen Reactions     No Known Drug Allergies      Problem List:    Patient Active Problem List    Diagnosis Date Noted     Encounter for triage in pregnant patient 10/07/2020     Priority: Medium     Encounter for supervision of other normal pregnancy, third trimester 2020     Priority: Medium     Added automatically from request for surgery [id number]       Patient declines vaginal birth after  section () 2020     Priority: Medium     S/P  section 2018     Priority: Medium     Supervision of other high risk pregnancy, antepartum 2018     Priority: Medium     Acquired lactose intolerance 2018     Priority: Medium     Rh negative state in antepartum  period, unspecified trimester 2018     Priority: Medium     Gastroesophageal reflux disease, esophagitis presence not specified 2018     Priority: Medium     IMO Regulatory Load OCT 2020       Iron deficiency anemia, unspecified iron deficiency anemia type 2017     Priority: Medium     CARDIOVASCULAR SCREENING; LDL GOAL LESS THAN 160 2016     Priority: Medium     Contraception 05/15/2015     Priority: Medium     Ingrown toenail 2012     Priority: Medium     Cystic acne 2011     Priority: Medium      Past Medical History:    Past Medical History:   Diagnosis Date     Supervision of high-risk pregnancy 2018     Past Surgical History:    Past Surgical History:   Procedure Laterality Date      SECTION N/A 2018    Procedure:  SECTION;  multi select  Delivery;  Surgeon: Douglas Cardenas MD;  Location: [facility]      SECTION N/A 10/7/2020    Procedure:  section;  Surgeon: Alannah Carrillo DO;  Location: [facility]     HC TOOTH EXTRACTION W/FORCEP       Family History:    Family History   Problem Relation Age of Onset     C.A.D. Maternal Grandfather         50's?     Hyperlipidemia Mother      Anemia Mother      Crohn's Disease Father      Cancer Paternal Grandmother         lung     Prostate Cancer Paternal Grandfather      No Known Problems Daughter      Social History:  Marital Status:   [2]  Social History     Tobacco Use     Smoking status: Never Smoker     Smokeless tobacco: Never Used   Substance Use Topics     Alcohol use: No     Drug use: No      Medications:         acetaminophen (TYLENOL) 325 MG tablet       albuterol (PROVENTIL) (2.5 MG/3ML) 0.083% neb solution       cephALEXin (KEFLEX) 500 MG capsule       ibuprofen (ADVIL/MOTRIN) 600 MG tablet       order for DME       Prenatal Vit-Fe Fumarate-FA (PRENATAL MULTIVITAMIN W/IRON) 27-0.8 MG tablet       senna-docusate (SENOKOT-S/PERICOLACE) 8.6-50 MG tablet      Review of  "Systems   Constitutional: Negative for chills and fever.   HENT: Negative for congestion, ear pain and sore throat.    Eyes: Negative for photophobia, pain, discharge, redness and itching.   Respiratory: Negative for cough, choking and wheezing.    Cardiovascular: Negative for chest pain and palpitations.   Gastrointestinal: Negative for abdominal pain, diarrhea, nausea and vomiting.   Genitourinary: Positive for vaginal bleeding. Negative for decreased urine volume, dysuria, frequency, hematuria, urgency and vaginal pain.   Skin: Negative for color change, rash and wound.     Physical Exam   BP: 121/86  Pulse: 86  Temp: 99.2  F (37.3  C)  Resp: 18  Height: 157.5 cm (5' 2\")  Weight: 60.8 kg (134 lb)  SpO2: 100 %  Physical Exam  GENERAL APPEARANCE: healthy, alert and no distress  EYES: EOMI,  PERRL, conjunctiva clear  RESP: lungs clear to auscultation - no rales, rhonchi or wheezes  CV: regular rates and rhythm, normal S1 S2, no murmur noted  ABDOMEN:  soft, nontender, no HSM or masses and bowel sounds normal  : external genitalia normal, small to moderate amount or dark brown blood with mucous and small clots in vagina eminating from cervical os.  No cervical motion tenderness.    SKIN: Patient has well-healing surgical incision site over the lower pelvis with overlying surgical adhesive, no erythema, discharge or evidence of dehiscence   ED Course        Procedures        Critical Care time:  none            Results for orders placed or performed during the hospital encounter of 10/21/20 (from the past 24 hour(s))   CBC with platelets differential   Result Value Ref Range    WBC 8.0 4.0 - 11.0 10e9/L    RBC Count 4.35 3.8 - 5.2 10e12/L    Hemoglobin 12.1 11.7 - 15.7 g/dL    Hematocrit 38.4 35.0 - 47.0 %    MCV 88 78 - 100 fl    MCH 27.8 26.5 - 33.0 pg    MCHC 31.5 31.5 - 36.5 g/dL    RDW 11.9 10.0 - 15.0 %    Platelet Count 390 150 - 450 10e9/L    Diff Method Automated Method     % Neutrophils 63.5 %    % " Lymphocytes 27.3 %    % Monocytes 7.1 %    % Eosinophils 1.5 %    % Basophils 0.4 %    % Immature Granulocytes 0.2 %    Nucleated RBCs 0 0 /100    Absolute Neutrophil 5.1 1.6 - 8.3 10e9/L    Absolute Lymphocytes 2.2 0.8 - 5.3 10e9/L    Absolute Monocytes 0.6 0.0 - 1.3 10e9/L    Absolute Eosinophils 0.1 0.0 - 0.7 10e9/L    Absolute Basophils 0.0 0.0 - 0.2 10e9/L    Abs Immature Granulocytes 0.0 0 - 0.4 10e9/L    Absolute Nucleated RBC 0.0    UA reflex to Microscopic   Result Value Ref Range    Color Urine Straw     Appearance Urine Clear     Glucose Urine Negative NEG^Negative mg/dL    Bilirubin Urine Negative NEG^Negative    Ketones Urine Negative NEG^Negative mg/dL    Specific Gravity Urine 1.013 1.003 - 1.035    Blood Urine Large (A) NEG^Negative    pH Urine 6.0 5.0 - 7.0 pH    Protein Albumin Urine Negative NEG^Negative mg/dL    Urobilinogen mg/dL 0.0 0.0 - 2.0 mg/dL    Nitrite Urine Negative NEG^Negative    Leukocyte Esterase Urine Negative NEG^Negative    Source Midstream Urine     RBC Urine <1 0 - 2 /HPF    WBC Urine 1 0 - 5 /HPF    Squamous Epithelial /HPF Urine <1 0 - 1 /HPF    Mucous Urine Present (A) NEG^Negative /LPF   US Pelvic Complete with Transvaginal    Narrative    US PELVIC COMPLETE WITH TRANSVAGINAL 10/21/2020 9:28 PM    CLINICAL HISTORY:  10/7/20 after having PROM with breech  presentation, initial bleeding resolved however recurred today with  passage of clots, assess for retained uterine products.    TECHNIQUE: Transabdominal scans were performed. Endovaginal ultrasound  was performed to better visualize the adnexa.    COMPARISON: 2020    FINDINGS:    UTERUS: 14.0 x 6.7 x 8.6 cm. Normal in size and position with no  masses. The myometrium is markedly thinned in the lower uterine  segment.    ENDOMETRIUM: 28 mm. Heterogeneous, predominantly hypoechoic material  in the endometrium. No vascularized material is visualized in the  endometrium.    RIGHT OVARY: Not visualized due to  intestinal gas.    LEFT OVARY: Not visualized due to intestinal gas.    No significant free fluid.      Impression    IMPRESSION:  1.  Heterogeneous, predominantly hypoechoic material in the  endometrium has the appearance of blood clot. No vascularized retained  products of conception demonstrated.  2.  Markedly thinned myometrium in the lower uterine segment, likely  at the site of  section. Thickness of the myometrium at this  site measures only 5 mm.      CHAD NIÑO MD       Medications - No data to display    Assessments & Plan (with Medical Decision Making)     I have reviewed the nursing notes.  I have reviewed the findings, diagnosis, plan and need for follow up with the patient.       Discharge Medication List as of 10/21/2020 10:09 PM        Final diagnoses:   Uterine bleeding, post- section     25-year-old female presents to the emergency department with concern over vaginal bleeding after having  section due to breech presentation and PROM on 10/7/20.  Patient had stable vital signs upon arrival.  Physical exam findings as described above showed mild amount of old appearing vaginal bleeding.  She did have stable hemoglobin, no evidence of urinary tract infection at this time.  Ultrasound of her pelvis did show heterogenous hypoechoic material in the endometrium which appears consistent with blood clots, no vascularized retained products of conception, markedly thin myometrium in the lower uterine segment likely at the site of the , thickness of the myometrium at this point measures only 5 mm.  I did discuss case with OB/GYN on-call Dr. Jeong who stated this falls within normal expected post delivery bleeding.  Patient was reassured of findings.  She was discharged home stable with instructions to follow-up as needed if new or worsening symptoms develop specifically heavy bleeding, soaking through a pad once an hour for several hours, development of fever,  increasing pelvic/abdominal discomfort, dizziness, tachycardia, or any other new or worsening symptoms.      Disclaimer: This note consists of symbols derived from keyboarding, dictation, and/or voice recognition software. As a result, there may be errors in the script that have gone undetected.  Please consider this when interpreting information found in the chart.    10/21/2020   [facility] EMERGENCY DEPT     Radha Nixon PA-C  10/23/20 6013

## 2020-10-23 ASSESSMENT — ENCOUNTER SYMPTOMS
PHOTOPHOBIA: 0
COUGH: 0
DYSURIA: 0
SORE THROAT: 0
NAUSEA: 0
EYE PAIN: 0
EYE DISCHARGE: 0
WOUND: 0
ABDOMINAL PAIN: 0
HEMATURIA: 0
WHEEZING: 0
CHOKING: 0
CHILLS: 0
VOMITING: 0
EYE REDNESS: 0
PALPITATIONS: 0
FEVER: 0
COLOR CHANGE: 0
EYE ITCHING: 0
FREQUENCY: 0
DIARRHEA: 0

## 2020-11-16 NOTE — PROGRESS NOTES
Subjective     Deanna Leahy is a 25 year old female who presents to clinic today for the following health issues:    HPI         {SUPERLIST (Optional):996133}  {additonal problems for provider to add (Optional):832144}    Review of Systems   {ROS COMP (Optional):447825}      Objective    LMP 01/30/2020 (Exact Date)   There is no height or weight on file to calculate BMI.  Physical Exam   {Exam List (Optional):673471}    {Diagnostic Test Results (Optional):462194}        {PROVIDER CHARTING PREFERENCE:493034}

## 2020-11-18 ENCOUNTER — OFFICE VISIT (OUTPATIENT)
Dept: FAMILY MEDICINE | Facility: OTHER | Age: 25
End: 2020-11-18
Payer: COMMERCIAL

## 2020-11-18 VITALS
BODY MASS INDEX: 24.51 KG/M2 | SYSTOLIC BLOOD PRESSURE: 100 MMHG | RESPIRATION RATE: 12 BRPM | OXYGEN SATURATION: 100 % | DIASTOLIC BLOOD PRESSURE: 70 MMHG | WEIGHT: 134 LBS | HEART RATE: 99 BPM | TEMPERATURE: 98.6 F

## 2020-11-18 DIAGNOSIS — Z28.21 IMMUNIZATION NOT CARRIED OUT BECAUSE OF PATIENT REFUSAL: ICD-10-CM

## 2020-11-18 PROCEDURE — 99207 PR POST PARTUM EXAM: CPT | Performed by: FAMILY MEDICINE

## 2020-11-18 NOTE — PROGRESS NOTES
Deanna is here for a 6-week postpartum checkup.    She had a repeat c/s of a viable boy, weight 7 pounds 4 oz., with  Labor complications. Date of delivery was 10/7/20. Since delivery, she has been breast feeding.      Notes frequent clogging of milk ducts.      She has no signs of infection, bleeding or other complications.  Did have some heavy bleeding,  But that  Has resolved.  Bleeding has been off and on.  Hasn't bled for about a week now.  She is not pregnant.  We discussed contraceptions and she has chosen vasectomy.      Post partum tubal: No  History of Gestational Diabetes? No  Type of Delivery:    Feeding Method:  Breast  If initiated breast feeding and stopped, how long did you breast feed?:  NA    REVIEW OF SYSTEMS:  Ears/Nose/Throat: negative  Respiratory: negative  Cardiovascular: negative  Gastrointestinal: negative  Genitourinary: negative, post-op  UTI  Musculoskeletal: negative    Neurologic: negative   Skin: negative   Endocrine:  negative  Vagina: negative  Cervix: negative  Breasts: positive for blocked ducts  Vulva: negative  Episiotomy: NA    Contraception Plan: vasectomy and condoms    Medical History Reviewed no  Family History Reviewed no  Problem List Updated no    EXAM:  /70   Pulse 99   Temp 98.6  F (37  C) (Temporal)   Resp 12   Wt 60.8 kg (134 lb)   LMP 2020 (Exact Date)   SpO2 100%   BMI 24.51 kg/m    HEENT: grossly normal.  NECK: no lymphadenopathy or thyroidomegaly.  LUNGS: CTA X 2, no rales or crackles.  BACK: No spinal or CVA tenderness.  HEART: RRR without murmurs clicks or gallops.  ABDOMEN: soft, non tender, good bowel sounds, without masses rebound, guarding or tenderness.  Incision well-healed.  Glue still in place.  PELVIC:  External genitalia; normal without lesion, repair well healed.   Vagina: normal mucosa and rugae, no discharge.  Cervix: Nulliparous, well healed, without lesion.  Uterus: non pregnant in size, firm , mobile, no  lesions,     uterus is anteverted towards the right.  Adnexa: non tender, without masses.    EXTREMITIES:  warm to touch, good pulses, no ankle edema or calf tenderness.  NEUROLOGIC: grossly normal.    ASSESSMENT:   6-week postpartum exam after repeat c/s for breech.    PLAN:    Contraception methods discussed  Exercise encouraged.  One-hour glucose tolerance test needed? No  vasectomy and condoms for contraception.

## 2020-11-18 NOTE — PATIENT INSTRUCTIONS
"Thank you for visiting [facility]    Let us know if any other issues arise.    I do recommend immunizations to help prevent disease.  Let me know if you change your mind about these.      If  You decide you want something else for birth control, let me know.    Contact us or return if questions or concerns.     Have a nice day!    Dr. Basilio     No follow-ups on file.      If you had imaging scheduled please refer to your radiology prep sheet.      If you need medication refills, please contact your pharmacy 3 days before your prescriptions runs out or download the [facility] cici for your smart phone.   If you are out of refills, your pharmacy will contact contact the clinic.    Contact us or return if questions or concerns.     -[facility] Care Team:    MD Sho Hooks PA-C Joel De Haan, PA-C Anna Niesen, PA-C Elizabeth \"Sharita\" JERICHO Palacios CNP, APRN, JERICHO Kinney, AKUA Smith, NACHON, RN, PHN                                         [facility] [phone]    We would like to hear from you, how was your visit today?    Dionne Fisher       Patient Information Supervisor     [place], [place], and [facility]             "

## 2020-12-08 ENCOUNTER — MEDICAL CORRESPONDENCE (OUTPATIENT)
Dept: HEALTH INFORMATION MANAGEMENT | Facility: CLINIC | Age: 25
End: 2020-12-08

## 2021-01-09 ENCOUNTER — HEALTH MAINTENANCE LETTER (OUTPATIENT)
Age: 26
End: 2021-01-09

## 2021-02-05 ENCOUNTER — MEDICAL CORRESPONDENCE (OUTPATIENT)
Dept: HEALTH INFORMATION MANAGEMENT | Facility: CLINIC | Age: 26
End: 2021-02-05

## 2021-03-26 NOTE — PLAN OF CARE
Take atenolol 25mg as needed    Cut methimazole to 10mg in AM and 5mg in PM    Do labs in 4 weeks, orders in the chart VSS and postpartum assessment WDL. She is up ad pankaj, voiding, pain managed with tylenol and IV Toradol. During the evening patient was having bright red urine, which has now resolved. Incision appears to be healing well with no s/s infection. Uterus firm and midline. Scant lochia rubra. No breast or nipple pain; pumping independently. Support person Tyler present at bedside; patient goes to visit baby often in NICU. Education provided on pain management, breast pump/cares, patients need of rhogam administration, and postpartum cares including bleeding. Continue with current plan of care.

## 2021-05-21 NOTE — PROGRESS NOTES
Assessment & Plan     Encounter for initial prescription of contraceptive pills  Will start the Sunday after her next period, will continue condoms in mean time  - norgestim-eth estrad triphasic (ORTHO TRI-CYCLEN LO) 0.18/0.215/0.25 MG-25 MCG tablet; Take 1 tablet by mouth daily    Excessive or frequent menstruation  Awaiting results, BCP should help this as well  - TSH with free T4 reflex    Iron deficiency anemia, unspecified iron deficiency anemia type  pending  - CBC with platelets  - Ferritin  - Iron    Encounter for vitamin deficiency screening    - Vitamin B12  - Vitamin D Deficiency                 Return in about 1 year (around 5/24/2022) for Physical Exam.    Sho De La Torre PA-C  [facility] DORIE Huerta is a 26 year old who presents for the following health issues     History of Present Illness       She eats 4 or more servings of fruits and vegetables daily.She consumes 0 sweetened beverage(s) daily.She exercises with enough effort to increase her heart rate 20 to 29 minutes per day.  She exercises with enough effort to increase her heart rate 4 days per week.   She is taking medications regularly.       Pt took at home B12 test and was high. She has stopped vit d supplement and is only taking MVI at this time.  Denies she is on any herbal supplements . She has had the following symptoms: Losing hair-- she has had a baby in Oct 2020 and she stopped nursing in the last 1.5 months, heavy periods- , menses just restarted again after stopping nursing about 1 month ago her menses were heavy in past as well.  Also has had some vaginal, eye and mouth dryness though mouth dryness is better. They are using condoms for contraception currently  was going to have vas but due to their young age, the MD advised waiting awhile to be sure before he had vas, derm concerns- increased cystic acne again, and having hormonal issues x years.  Was on bcp in the past but did not do  well emotionally on them but has not been on bcp since 2015.  Willing to try again.  No tob use or history of blood clot.  She does have a history of iron def anemia as well and would like this checked.        Review of Systems   Constitutional, HEENT, cardiovascular, pulmonary, gi and gu systems are negative, except as otherwise noted.      Objective    /86   Pulse 80   Temp 97.8  F (36.6  C) (Temporal)   Resp 12   Wt 58.5 kg (129 lb)   LMP 05/08/2021 (Exact Date)   SpO2 99%   Breastfeeding No   BMI 23.59 kg/m    Body mass index is 23.59 kg/m .  Physical Exam   GENERAL: healthy, alert and no distress  NECK: no adenopathy, no asymmetry, masses, or scars and thyroid normal to palpation  RESP: lungs clear to auscultation - no rales, rhonchi or wheezes  CV: regular rate and rhythm, normal S1 S2, no S3 or S4, no murmur, click or rub, no peripheral edema and peripheral pulses strong  ABDOMEN: soft, nontender, no hepatosplenomegaly, no masses and bowel sounds normal  MS: no gross musculoskeletal defects noted, no edema  PSYCH: mentation appears normal, affect normal/bright    Results for orders placed or performed in visit on 05/24/21   CBC with platelets     Status: None   Result Value Ref Range    WBC 5.5 4.0 - 11.0 10e9/L    RBC Count 4.84 3.8 - 5.2 10e12/L    Hemoglobin 13.8 11.7 - 15.7 g/dL    Hematocrit 43.2 35.0 - 47.0 %    MCV 89 78 - 100 fl    MCH 28.5 26.5 - 33.0 pg    MCHC 31.9 31.5 - 36.5 g/dL    RDW 12.7 10.0 - 15.0 %    Platelet Count 260 150 - 450 10e9/L

## 2021-05-24 ENCOUNTER — OFFICE VISIT (OUTPATIENT)
Dept: FAMILY MEDICINE | Facility: OTHER | Age: 26
End: 2021-05-24
Payer: COMMERCIAL

## 2021-05-24 VITALS
TEMPERATURE: 97.8 F | HEART RATE: 80 BPM | OXYGEN SATURATION: 99 % | WEIGHT: 129 LBS | BODY MASS INDEX: 23.59 KG/M2 | RESPIRATION RATE: 12 BRPM | DIASTOLIC BLOOD PRESSURE: 86 MMHG | SYSTOLIC BLOOD PRESSURE: 114 MMHG

## 2021-05-24 DIAGNOSIS — D50.9 IRON DEFICIENCY ANEMIA, UNSPECIFIED IRON DEFICIENCY ANEMIA TYPE: ICD-10-CM

## 2021-05-24 DIAGNOSIS — Z30.011 ENCOUNTER FOR INITIAL PRESCRIPTION OF CONTRACEPTIVE PILLS: Primary | ICD-10-CM

## 2021-05-24 DIAGNOSIS — Z13.21 ENCOUNTER FOR VITAMIN DEFICIENCY SCREENING: ICD-10-CM

## 2021-05-24 DIAGNOSIS — N92.0 EXCESSIVE OR FREQUENT MENSTRUATION: ICD-10-CM

## 2021-05-24 LAB
DEPRECATED CALCIDIOL+CALCIFEROL SERPL-MC: 35 UG/L (ref 20–75)
ERYTHROCYTE [DISTWIDTH] IN BLOOD BY AUTOMATED COUNT: 12.7 % (ref 10–15)
FERRITIN SERPL-MCNC: 15 NG/ML (ref 12–150)
HCT VFR BLD AUTO: 43.2 % (ref 35–47)
HGB BLD-MCNC: 13.8 G/DL (ref 11.7–15.7)
IRON SERPL-MCNC: 46 UG/DL (ref 35–180)
MCH RBC QN AUTO: 28.5 PG (ref 26.5–33)
MCHC RBC AUTO-ENTMCNC: 31.9 G/DL (ref 31.5–36.5)
MCV RBC AUTO: 89 FL (ref 78–100)
PLATELET # BLD AUTO: 260 10E9/L (ref 150–450)
RBC # BLD AUTO: 4.84 10E12/L (ref 3.8–5.2)
T4 FREE SERPL-MCNC: 1.12 NG/DL (ref 0.76–1.46)
TSH SERPL DL<=0.005 MIU/L-ACNC: 4.12 MU/L (ref 0.4–4)
VIT B12 SERPL-MCNC: 1061 PG/ML (ref 193–986)
WBC # BLD AUTO: 5.5 10E9/L (ref 4–11)

## 2021-05-24 PROCEDURE — 36415 COLL VENOUS BLD VENIPUNCTURE: CPT | Performed by: PHYSICIAN ASSISTANT

## 2021-05-24 PROCEDURE — 82728 ASSAY OF FERRITIN: CPT | Performed by: PHYSICIAN ASSISTANT

## 2021-05-24 PROCEDURE — 85027 COMPLETE CBC AUTOMATED: CPT | Performed by: PHYSICIAN ASSISTANT

## 2021-05-24 PROCEDURE — 82306 VITAMIN D 25 HYDROXY: CPT | Performed by: PHYSICIAN ASSISTANT

## 2021-05-24 PROCEDURE — 99214 OFFICE O/P EST MOD 30 MIN: CPT | Performed by: PHYSICIAN ASSISTANT

## 2021-05-24 PROCEDURE — 84443 ASSAY THYROID STIM HORMONE: CPT | Performed by: PHYSICIAN ASSISTANT

## 2021-05-24 PROCEDURE — 83540 ASSAY OF IRON: CPT | Performed by: PHYSICIAN ASSISTANT

## 2021-05-24 PROCEDURE — 82607 VITAMIN B-12: CPT | Performed by: PHYSICIAN ASSISTANT

## 2021-05-24 PROCEDURE — 84439 ASSAY OF FREE THYROXINE: CPT | Performed by: PHYSICIAN ASSISTANT

## 2021-05-24 RX ORDER — NORGESTIMATE AND ETHINYL ESTRADIOL 7DAYSX3 LO
1 KIT ORAL DAILY
Qty: 84 TABLET | Refills: 3 | Status: SHIPPED | OUTPATIENT
Start: 2021-05-24 | End: 2021-07-20

## 2021-07-19 DIAGNOSIS — Z30.011 ENCOUNTER FOR INITIAL PRESCRIPTION OF CONTRACEPTIVE PILLS: ICD-10-CM

## 2021-07-20 RX ORDER — NORGESTIMATE AND ETHINYL ESTRADIOL 7DAYSX3 LO
1 KIT ORAL DAILY
Qty: 84 TABLET | Refills: 2 | Status: SHIPPED | OUTPATIENT
Start: 2021-07-20

## 2021-10-23 ENCOUNTER — HEALTH MAINTENANCE LETTER (OUTPATIENT)
Age: 26
End: 2021-10-23

## 2021-12-18 ENCOUNTER — HEALTH MAINTENANCE LETTER (OUTPATIENT)
Age: 26
End: 2021-12-18

## 2022-10-09 ENCOUNTER — HEALTH MAINTENANCE LETTER (OUTPATIENT)
Age: 27
End: 2022-10-09

## 2023-02-18 ENCOUNTER — HEALTH MAINTENANCE LETTER (OUTPATIENT)
Age: 28
End: 2023-02-18

## 2023-03-23 NOTE — TELEPHONE ENCOUNTER
Patient is just wondering what her results are from the lab she done this morning.     MEDICATIONS  (STANDING):  atorvastatin 40 milliGRAM(s) Oral at bedtime  bisacodyl 5 milliGRAM(s) Oral at bedtime  cloZAPine 50 milliGRAM(s) Oral at bedtime  cloZAPine 100 milliGRAM(s) Oral two times a day  divalproex  milliGRAM(s) Oral daily  divalproex  milliGRAM(s) Oral at bedtime  levothyroxine 25 MICROGram(s) Oral daily  LORazepam     Tablet 2 milliGRAM(s) Oral at bedtime  metFORMIN 1000 milliGRAM(s) Oral two times a day  metoprolol succinate ER 25 milliGRAM(s) Oral daily    MEDICATIONS  (PRN):  acetaminophen     Tablet .. 325 milliGRAM(s) Oral every 6 hours PRN Mild Pain (1 - 3), Moderate Pain (4 - 6)  LORazepam     Tablet 2 milliGRAM(s) Oral every 6 hours PRN Agitation  senna 1 Tablet(s) Oral daily PRN Constipation

## 2023-12-06 NOTE — PLAN OF CARE
CPAP order submitted to [employer] via [facility] Problem: Patient Care Overview  Goal: Plan of Care/Patient Progress Review  Outcome: Improving  S: Shift review  B:Deanna is a , day 2 post  birth.  A: Stable post-op, incisional dressing is dry & intact , Lung sounds-clear, bowel sounds active in all 4 quadrants, independent with mobility, pain control achieved with p.o. pain meds. Handles baby with confidence.  R: Continue with plan of care. Offer pain meds routinely.

## 2024-03-16 ENCOUNTER — HEALTH MAINTENANCE LETTER (OUTPATIENT)
Age: 29
End: 2024-03-16

## 2024-06-16 NOTE — PROGRESS NOTES
[facility]   Obstetrics Post-Op / Progress Note         Assessment and Plan:    Assessment:   Post-operative day #1  Low transverse primary  section  L&D complications: Breech presentation  Intrauterine pregnancy at 39 weeks gestation      Doing well.  No immediate surgical complications identified.  No excessive bleeding  Pain well-controlled.        Plan:   Ambulation encouraged  Breast feeding strategies discussed  Pain control measures as needed  RhoGam given  Uterine massage  Anticipate discharge in 2 days            Interval History:   Doing well.  Pain is well-controlled.  No fevers.  No history of wound drainage, warmth or significant erythema.  Good appetite.  Denies chest pain, shortness of breath, nausea or vomiting.  Ambulatory.  Breastfeeding well.          Significant Problems:      Patient Active Problem List   Diagnosis     Cystic acne     Ingrown toenail     Contraception     CARDIOVASCULAR SCREENING; LDL GOAL LESS THAN 160     Iron deficiency anemia, unspecified iron deficiency anemia type     Gastroesophageal reflux disease, esophagitis presence not specified     Rh negative state in antepartum period, unspecified trimester     Encounter for supervision of other normal pregnancy, unspecified trimester     Acquired lactose intolerance     Breech presentation, single or unspecified fetus     Supervision of high-risk pregnancy     Breech presentation     S/P  section             Review of Systems:    The patient denies any chest pain, shortness of breath, excessive pain, fever, chills, purulent drainage from the wound, nausea or vomiting.          Medications:     Current Facility-Administered Medications   Medication     acetaminophen (TYLENOL) tablet 650 mg     [START ON 2018] bisacodyl (DULCOLAX) Suppository 10 mg     bupivacaine (MARCAINE) 0.25 % injection     dextrose 5% in lactated ringers infusion     ferrous sulfate (IRON) tablet 325 mg     hydrocortisone  2.5 % cream     ibuprofen (ADVIL/MOTRIN) tablet 800 mg     lactated ringers BOLUS 1,000 mL     lanolin ointment     lidocaine (LMX4) kit     lidocaine 1 % 1 mL     No MMR Needed -  Assessment: Patient does not need MMR vaccine     NO Rho (D) immune globulin (RhoGam) needed - mother Rh POSITIVE     No Tdap Needed - Assessment: Patient does not need Tdap vaccine     ondansetron (ZOFRAN) injection 4 mg     oxyCODONE IR (ROXICODONE) tablet 5-10 mg     oxytocin (PITOCIN) 30 units in 500 mL 0.9% NaCl infusion     oxytocin (PITOCIN) 30 units in 500 mL 0.9% NaCl infusion     oxytocin (PITOCIN) injection 10 Units     rho(D) immune globulin (HYPERRHO/RHOGAM) injection 300 mcg     ROPivacaine 0.2% (NAROPIN) 600 mL, ON-Q 5 inch (-W) silver  catheter 2 catheter in ON-Q C-Bloc select flow ([id number] holds 600-750 mL) dual cath disposable pump     senna-docusate (SENOKOT-S;PERICOLACE) 8.6-50 MG per tablet 1 tablet    Or     senna-docusate (SENOKOT-S;PERICOLACE) 8.6-50 MG per tablet 2 tablet     simethicone (MYLICON) chewable tablet 80 mg     sodium chloride (PF) 0.9% PF flush 3 mL     sodium chloride (PF) 0.9% PF flush 3 mL     [START ON 9/26/2018] sodium phosphate (FLEET ENEMA) 1 enema     tranexamic acid (CYKLOKAPRON) 1 g in sodium chloride 0.9 % 50 mL bolus             Physical Exam:   All vitals stable  Temp: 98  F (36.7  C) Temp src: Oral BP: 129/88 Pulse: 101 Heart Rate: 92 Resp: 16 SpO2: 97 % O2 Device: None (Room air)    Dressing clean and dry with minimal or no drainage.  Surrounding skin with minimal erythema.          Data:   All laboratory data related to this surgery reviewed  Results for orders placed or performed during the hospital encounter of 09/24/18 (from the past 24 hour(s))   Rh Immune Globulin Study   Result Value Ref Range    ABO AB     RH(D) Neg     Fetal Blood Screen Neg     Blood Bank Comment       Antibody screening not done due to Prenatal Rhogam  Suitable for Rh Immunoglobulin      RhIg  Administered PENDING     Amount of RHIG Required 300 micrograms Rh Immune Globulin required    Hemoglobin   Result Value Ref Range    Hemoglobin 10.2 (L) 11.7 - 15.7 g/dL     No imaging studies have been ordered    Romulo Basilio MD, MD   LLE covered in gauze dressing

## (undated) DEVICE — PREP CHLORAPREP 26ML TINTED ORANGE  260815

## (undated) DEVICE — SU DERMABOND PROPEN .5ML DPP6

## (undated) DEVICE — DRSG ABDOMINAL 07 1/2X8" 7197D

## (undated) DEVICE — GLOVE PROTEXIS W/NEU-THERA 7.5  2D73TE75

## (undated) DEVICE — PACK C-SECTION

## (undated) DEVICE — STPL SKIN SUBCUTICULAR INSORB 2025

## (undated) DEVICE — DRSG TEGADERM 6X8" 1628

## (undated) DEVICE — SU MONOCRYL 0 CT-1 36" UND Y946H

## (undated) DEVICE — SOL NACL 0.9% IRRIG 1000ML BOTTLE 07138-09

## (undated) DEVICE — SU PLAIN 0 FN-2 27" N864H

## (undated) DEVICE — CLAMP CORD

## (undated) DEVICE — CATH TRAY FOLEY 16FR DRAINAGE BAG STATLOCK 899916

## (undated) DEVICE — STOCKING SLEEVE COMPRESSION CALF MED

## (undated) RX ORDER — OXYTOCIN/0.9 % SODIUM CHLORIDE 30/500 ML
PLASTIC BAG, INJECTION (ML) INTRAVENOUS
Status: DISPENSED
Start: 2020-10-07

## (undated) RX ORDER — FENTANYL CITRATE-0.9 % NACL/PF 10 MCG/ML
PLASTIC BAG, INJECTION (ML) INTRAVENOUS
Status: DISPENSED
Start: 2020-10-07

## (undated) RX ORDER — MORPHINE SULFATE 1 MG/ML
INJECTION, SOLUTION EPIDURAL; INTRATHECAL; INTRAVENOUS
Status: DISPENSED
Start: 2020-10-07

## (undated) RX ORDER — ONDANSETRON 2 MG/ML
INJECTION INTRAMUSCULAR; INTRAVENOUS
Status: DISPENSED
Start: 2020-10-07

## (undated) RX ORDER — KETOROLAC TROMETHAMINE 30 MG/ML
INJECTION, SOLUTION INTRAMUSCULAR; INTRAVENOUS
Status: DISPENSED
Start: 2018-09-24

## (undated) RX ORDER — PHENYLEPHRINE HCL IN 0.9% NACL 1 MG/10 ML
SYRINGE (ML) INTRAVENOUS
Status: DISPENSED
Start: 2018-09-24

## (undated) RX ORDER — FENTANYL CITRATE 50 UG/ML
INJECTION, SOLUTION INTRAMUSCULAR; INTRAVENOUS
Status: DISPENSED
Start: 2020-10-07

## (undated) RX ORDER — DEXAMETHASONE SODIUM PHOSPHATE 10 MG/ML
INJECTION, SOLUTION INTRAMUSCULAR; INTRAVENOUS
Status: DISPENSED
Start: 2020-10-07

## (undated) RX ORDER — KETOROLAC TROMETHAMINE 30 MG/ML
INJECTION, SOLUTION INTRAMUSCULAR; INTRAVENOUS
Status: DISPENSED
Start: 2020-10-07

## (undated) RX ORDER — OXYTOCIN/0.9 % SODIUM CHLORIDE 30/500 ML
PLASTIC BAG, INJECTION (ML) INTRAVENOUS
Status: DISPENSED
Start: 2018-09-24

## (undated) RX ORDER — MORPHINE SULFATE 1 MG/ML
INJECTION, SOLUTION EPIDURAL; INTRATHECAL; INTRAVENOUS
Status: DISPENSED
Start: 2018-09-24

## (undated) RX ORDER — BUPIVACAINE HYDROCHLORIDE 2.5 MG/ML
INJECTION, SOLUTION EPIDURAL; INFILTRATION; INTRACAUDAL
Status: DISPENSED
Start: 2020-10-07

## (undated) RX ORDER — ONDANSETRON 2 MG/ML
INJECTION INTRAMUSCULAR; INTRAVENOUS
Status: DISPENSED
Start: 2018-09-24